# Patient Record
Sex: MALE | Race: WHITE | Employment: UNEMPLOYED | URBAN - METROPOLITAN AREA
[De-identification: names, ages, dates, MRNs, and addresses within clinical notes are randomized per-mention and may not be internally consistent; named-entity substitution may affect disease eponyms.]

---

## 2017-01-01 ENCOUNTER — APPOINTMENT (OUTPATIENT)
Dept: CT IMAGING | Facility: CLINIC | Age: 62
DRG: 432 | End: 2017-01-01
Attending: EMERGENCY MEDICINE
Payer: COMMERCIAL

## 2017-01-01 ENCOUNTER — APPOINTMENT (OUTPATIENT)
Dept: SPEECH THERAPY | Facility: CLINIC | Age: 62
DRG: 432 | End: 2017-01-01
Attending: INTERNAL MEDICINE
Payer: COMMERCIAL

## 2017-01-01 ENCOUNTER — APPOINTMENT (OUTPATIENT)
Dept: GENERAL RADIOLOGY | Facility: CLINIC | Age: 62
DRG: 432 | End: 2017-01-01
Attending: EMERGENCY MEDICINE
Payer: COMMERCIAL

## 2017-01-01 ENCOUNTER — APPOINTMENT (OUTPATIENT)
Dept: SPEECH THERAPY | Facility: CLINIC | Age: 62
DRG: 432 | End: 2017-01-01
Payer: COMMERCIAL

## 2017-01-01 ENCOUNTER — APPOINTMENT (OUTPATIENT)
Dept: OCCUPATIONAL THERAPY | Facility: CLINIC | Age: 62
DRG: 432 | End: 2017-01-01
Attending: INTERNAL MEDICINE
Payer: COMMERCIAL

## 2017-01-01 ENCOUNTER — APPOINTMENT (OUTPATIENT)
Dept: PHYSICAL THERAPY | Facility: CLINIC | Age: 62
DRG: 432 | End: 2017-01-01
Attending: INTERNAL MEDICINE
Payer: COMMERCIAL

## 2017-01-01 ENCOUNTER — APPOINTMENT (OUTPATIENT)
Dept: GENERAL RADIOLOGY | Facility: CLINIC | Age: 62
DRG: 432 | End: 2017-01-01
Attending: INTERNAL MEDICINE
Payer: COMMERCIAL

## 2017-01-01 ENCOUNTER — HOSPITAL ENCOUNTER (INPATIENT)
Facility: CLINIC | Age: 62
LOS: 10 days | Discharge: HOSPICE | DRG: 432 | End: 2017-10-04
Attending: EMERGENCY MEDICINE | Admitting: INTERNAL MEDICINE
Payer: COMMERCIAL

## 2017-01-01 VITALS
SYSTOLIC BLOOD PRESSURE: 94 MMHG | WEIGHT: 139.6 LBS | DIASTOLIC BLOOD PRESSURE: 57 MMHG | TEMPERATURE: 96.4 F | HEART RATE: 95 BPM | OXYGEN SATURATION: 91 % | RESPIRATION RATE: 28 BRPM

## 2017-01-01 DIAGNOSIS — E16.2 HYPOGLYCEMIA: ICD-10-CM

## 2017-01-01 DIAGNOSIS — E87.20 LACTIC ACIDOSIS: ICD-10-CM

## 2017-01-01 DIAGNOSIS — G93.40 ENCEPHALOPATHY: ICD-10-CM

## 2017-01-01 DIAGNOSIS — E87.1 HYPONATREMIA: ICD-10-CM

## 2017-01-01 DIAGNOSIS — R57.9 SHOCK (H): ICD-10-CM

## 2017-01-01 DIAGNOSIS — E87.5 HYPERKALEMIA: ICD-10-CM

## 2017-01-01 DIAGNOSIS — K92.2 ACUTE UPPER GASTROINTESTINAL HEMORRHAGE: ICD-10-CM

## 2017-01-01 DIAGNOSIS — N19 RENAL FAILURE, UNSPECIFIED CHRONICITY: ICD-10-CM

## 2017-01-01 DIAGNOSIS — Z51.5 HOSPICE CARE PATIENT: Primary | ICD-10-CM

## 2017-01-01 DIAGNOSIS — K72.90 LIVER FAILURE WITHOUT HEPATIC COMA, UNSPECIFIED CHRONICITY (H): ICD-10-CM

## 2017-01-01 LAB
ABO + RH BLD: NORMAL
ABO + RH BLD: NORMAL
ALBUMIN SERPL-MCNC: 1.5 G/DL (ref 3.4–5)
ALBUMIN SERPL-MCNC: 1.5 G/DL (ref 3.4–5)
ALBUMIN SERPL-MCNC: 1.6 G/DL (ref 3.4–5)
ALBUMIN SERPL-MCNC: 1.7 G/DL (ref 3.4–5)
ALBUMIN SERPL-MCNC: 1.7 G/DL (ref 3.4–5)
ALBUMIN SERPL-MCNC: 1.8 G/DL (ref 3.4–5)
ALBUMIN SERPL-MCNC: 1.8 G/DL (ref 3.4–5)
ALBUMIN UR-MCNC: 30 MG/DL
ALBUMIN UR-MCNC: NEGATIVE MG/DL
ALP SERPL-CCNC: 159 U/L (ref 40–150)
ALP SERPL-CCNC: 186 U/L (ref 40–150)
ALP SERPL-CCNC: 195 U/L (ref 40–150)
ALP SERPL-CCNC: 212 U/L (ref 40–150)
ALP SERPL-CCNC: 215 U/L (ref 40–150)
ALP SERPL-CCNC: 382 U/L (ref 40–150)
ALP SERPL-CCNC: 386 U/L (ref 40–150)
ALP SERPL-CCNC: 421 U/L (ref 40–150)
ALP SERPL-CCNC: 454 U/L (ref 40–150)
ALT SERPL W P-5'-P-CCNC: 100 U/L (ref 0–70)
ALT SERPL W P-5'-P-CCNC: 100 U/L (ref 0–70)
ALT SERPL W P-5'-P-CCNC: 42 U/L (ref 0–70)
ALT SERPL W P-5'-P-CCNC: 46 U/L (ref 0–70)
ALT SERPL W P-5'-P-CCNC: 49 U/L (ref 0–70)
ALT SERPL W P-5'-P-CCNC: 58 U/L (ref 0–70)
ALT SERPL W P-5'-P-CCNC: 65 U/L (ref 0–70)
ALT SERPL W P-5'-P-CCNC: 84 U/L (ref 0–70)
ALT SERPL W P-5'-P-CCNC: 88 U/L (ref 0–70)
AMMONIA PLAS-SCNC: 12 UMOL/L (ref 10–50)
AMMONIA PLAS-SCNC: 24 UMOL/L (ref 10–50)
AMMONIA PLAS-SCNC: 28 UMOL/L (ref 10–50)
AMMONIA PLAS-SCNC: 36 UMOL/L (ref 10–50)
AMMONIA PLAS-SCNC: 90 UMOL/L (ref 10–50)
AMORPH CRY #/AREA URNS HPF: ABNORMAL /HPF
AMORPH CRY #/AREA URNS HPF: ABNORMAL /HPF
ANION GAP SERPL CALCULATED.3IONS-SCNC: 10 MMOL/L (ref 3–14)
ANION GAP SERPL CALCULATED.3IONS-SCNC: 17 MMOL/L (ref 3–14)
ANION GAP SERPL CALCULATED.3IONS-SCNC: 18 MMOL/L (ref 3–14)
ANION GAP SERPL CALCULATED.3IONS-SCNC: 21 MMOL/L (ref 6–17)
ANION GAP SERPL CALCULATED.3IONS-SCNC: 21 MMOL/L (ref 6–17)
ANION GAP SERPL CALCULATED.3IONS-SCNC: 23 MMOL/L (ref 3–14)
ANION GAP SERPL CALCULATED.3IONS-SCNC: 5 MMOL/L (ref 3–14)
ANION GAP SERPL CALCULATED.3IONS-SCNC: 7 MMOL/L (ref 3–14)
ANION GAP SERPL CALCULATED.3IONS-SCNC: 7 MMOL/L (ref 3–14)
ANION GAP SERPL CALCULATED.3IONS-SCNC: 9 MMOL/L (ref 3–14)
ANISOCYTOSIS BLD QL SMEAR: SLIGHT
APAP SERPL-MCNC: <2 MG/L (ref 10–20)
APPEARANCE UR: ABNORMAL
APPEARANCE UR: ABNORMAL
APTT PPP: 40 SEC (ref 22–37)
AST SERPL W P-5'-P-CCNC: 108 U/L (ref 0–45)
AST SERPL W P-5'-P-CCNC: 112 U/L (ref 0–45)
AST SERPL W P-5'-P-CCNC: 140 U/L (ref 0–45)
AST SERPL W P-5'-P-CCNC: 148 U/L (ref 0–45)
AST SERPL W P-5'-P-CCNC: 154 U/L (ref 0–45)
AST SERPL W P-5'-P-CCNC: 169 U/L (ref 0–45)
AST SERPL W P-5'-P-CCNC: 208 U/L (ref 0–45)
AST SERPL W P-5'-P-CCNC: 249 U/L (ref 0–45)
AST SERPL W P-5'-P-CCNC: 316 U/L (ref 0–45)
BACTERIA #/AREA URNS HPF: ABNORMAL /HPF
BACTERIA SPEC CULT: ABNORMAL
BACTERIA SPEC CULT: ABNORMAL
BACTERIA SPEC CULT: NO GROWTH
BACTERIA SPEC CULT: NO GROWTH
BACTERIA SPEC CULT: NORMAL
BASE DEFICIT BLDA-SCNC: 6.2 MMOL/L
BASE DEFICIT BLDV-SCNC: 11.3 MMOL/L
BASOPHILS # BLD AUTO: 0 10E9/L (ref 0–0.2)
BASOPHILS NFR BLD AUTO: 0 %
BASOPHILS NFR BLD AUTO: 0.1 %
BASOPHILS NFR BLD AUTO: 0.1 %
BASOPHILS NFR BLD AUTO: 0.2 %
BILIRUB DIRECT SERPL-MCNC: 6.8 MG/DL (ref 0–0.2)
BILIRUB DIRECT SERPL-MCNC: 7.2 MG/DL (ref 0–0.2)
BILIRUB DIRECT SERPL-MCNC: 9.4 MG/DL (ref 0–0.2)
BILIRUB DIRECT SERPL-MCNC: 9.6 MG/DL (ref 0–0.2)
BILIRUB SERPL-MCNC: 11.3 MG/DL (ref 0.2–1.3)
BILIRUB SERPL-MCNC: 11.6 MG/DL (ref 0.2–1.3)
BILIRUB SERPL-MCNC: 12.6 MG/DL (ref 0.2–1.3)
BILIRUB SERPL-MCNC: 19.5 MG/DL (ref 0.2–1.3)
BILIRUB SERPL-MCNC: 20.9 MG/DL (ref 0.2–1.3)
BILIRUB SERPL-MCNC: 22.8 MG/DL (ref 0.2–1.3)
BILIRUB SERPL-MCNC: 23.3 MG/DL (ref 0.2–1.3)
BILIRUB SERPL-MCNC: 8.3 MG/DL (ref 0.2–1.3)
BILIRUB SERPL-MCNC: 8.7 MG/DL (ref 0.2–1.3)
BILIRUB UR QL STRIP: ABNORMAL
BILIRUB UR QL STRIP: ABNORMAL
BLD GP AB SCN SERPL QL: NORMAL
BLD PROD TYP BPU: NORMAL
BLD UNIT ID BPU: 0
BLOOD BANK CMNT PATIENT-IMP: NORMAL
BLOOD PRODUCT CODE: NORMAL
BPU ID: NORMAL
BUN SERPL-MCNC: 18 MG/DL (ref 7–30)
BUN SERPL-MCNC: 20 MG/DL (ref 7–30)
BUN SERPL-MCNC: 22 MG/DL (ref 7–30)
BUN SERPL-MCNC: 29 MG/DL (ref 7–30)
BUN SERPL-MCNC: 32 MG/DL (ref 7–30)
BUN SERPL-MCNC: 37 MG/DL (ref 7–30)
BUN SERPL-MCNC: 40 MG/DL (ref 7–30)
BUN SERPL-MCNC: 41 MG/DL (ref 7–30)
BUN SERPL-MCNC: 42 MG/DL (ref 7–30)
BUN SERPL-MCNC: 43 MG/DL (ref 7–30)
BUN SERPL-MCNC: 44 MG/DL (ref 7–30)
BUN SERPL-MCNC: 52 MG/DL (ref 7–30)
CA-I BLD-MCNC: 3.2 MG/DL (ref 4.4–5.2)
CA-I BLD-MCNC: 3.3 MG/DL (ref 4.4–5.2)
CA-I BLD-MCNC: 3.5 MG/DL (ref 4.4–5.2)
CA-I BLD-MCNC: 3.9 MG/DL (ref 4.4–5.2)
CA-I BLD-SCNC: 2.8 MG/DL (ref 4.4–5.2)
CA-I BLD-SCNC: 2.9 MG/DL (ref 4.4–5.2)
CA-I SERPL ISE-MCNC: 3.1 MG/DL (ref 4.4–5.2)
CA-I SERPL ISE-MCNC: 3.3 MG/DL (ref 4.4–5.2)
CALCIUM SERPL-MCNC: 5.6 MG/DL (ref 8.5–10.1)
CALCIUM SERPL-MCNC: 5.7 MG/DL (ref 8.5–10.1)
CALCIUM SERPL-MCNC: 5.7 MG/DL (ref 8.5–10.1)
CALCIUM SERPL-MCNC: 5.9 MG/DL (ref 8.5–10.1)
CALCIUM SERPL-MCNC: 6 MG/DL (ref 8.5–10.1)
CALCIUM SERPL-MCNC: 6.1 MG/DL (ref 8.5–10.1)
CALCIUM SERPL-MCNC: 6.6 MG/DL (ref 8.5–10.1)
CALCIUM SERPL-MCNC: 7.1 MG/DL (ref 8.5–10.1)
CALCIUM SERPL-MCNC: 7.3 MG/DL (ref 8.5–10.1)
CALCIUM SERPL-MCNC: 7.6 MG/DL (ref 8.5–10.1)
CALCIUM SERPL-MCNC: 7.9 MG/DL (ref 8.5–10.1)
CALCIUM SERPL-MCNC: 7.9 MG/DL (ref 8.5–10.1)
CHLORIDE BLD-SCNC: 81 MMOL/L (ref 94–109)
CHLORIDE BLD-SCNC: 86 MMOL/L (ref 94–109)
CHLORIDE SERPL-SCNC: 102 MMOL/L (ref 94–109)
CHLORIDE SERPL-SCNC: 103 MMOL/L (ref 94–109)
CHLORIDE SERPL-SCNC: 103 MMOL/L (ref 94–109)
CHLORIDE SERPL-SCNC: 81 MMOL/L (ref 94–109)
CHLORIDE SERPL-SCNC: 88 MMOL/L (ref 94–109)
CHLORIDE SERPL-SCNC: 89 MMOL/L (ref 94–109)
CHLORIDE SERPL-SCNC: 94 MMOL/L (ref 94–109)
CHLORIDE SERPL-SCNC: 95 MMOL/L (ref 94–109)
CHLORIDE SERPL-SCNC: 95 MMOL/L (ref 94–109)
CHLORIDE SERPL-SCNC: 99 MMOL/L (ref 94–109)
CK SERPL-CCNC: 209 U/L (ref 30–300)
CK SERPL-CCNC: 212 U/L (ref 30–300)
CO2 BLD-SCNC: 16 MMOL/L (ref 20–32)
CO2 BLD-SCNC: 16 MMOL/L (ref 20–32)
CO2 BLDCOV-SCNC: 14 MMOL/L (ref 21–28)
CO2 BLDCOV-SCNC: 15 MMOL/L (ref 21–28)
CO2 SERPL-SCNC: 16 MMOL/L (ref 20–32)
CO2 SERPL-SCNC: 17 MMOL/L (ref 20–32)
CO2 SERPL-SCNC: 18 MMOL/L (ref 20–32)
CO2 SERPL-SCNC: 22 MMOL/L (ref 20–32)
CO2 SERPL-SCNC: 23 MMOL/L (ref 20–32)
CO2 SERPL-SCNC: 24 MMOL/L (ref 20–32)
CO2 SERPL-SCNC: 25 MMOL/L (ref 20–32)
CO2 SERPL-SCNC: 25 MMOL/L (ref 20–32)
CO2 SERPL-SCNC: 26 MMOL/L (ref 20–32)
COLOR UR AUTO: ABNORMAL
COLOR UR AUTO: ABNORMAL
CORTIS SERPL-MCNC: 83.8 UG/DL (ref 4–22)
CREAT BLD-MCNC: 2.9 MG/DL (ref 0.66–1.25)
CREAT BLD-MCNC: 3.4 MG/DL (ref 0.66–1.25)
CREAT SERPL-MCNC: 0.42 MG/DL (ref 0.66–1.25)
CREAT SERPL-MCNC: 0.44 MG/DL (ref 0.66–1.25)
CREAT SERPL-MCNC: 0.52 MG/DL (ref 0.66–1.25)
CREAT SERPL-MCNC: 0.64 MG/DL (ref 0.66–1.25)
CREAT SERPL-MCNC: 0.67 MG/DL (ref 0.66–1.25)
CREAT SERPL-MCNC: 1.11 MG/DL (ref 0.66–1.25)
CREAT SERPL-MCNC: 1.59 MG/DL (ref 0.66–1.25)
CREAT SERPL-MCNC: 1.68 MG/DL (ref 0.66–1.25)
CREAT SERPL-MCNC: 2.03 MG/DL (ref 0.66–1.25)
CREAT SERPL-MCNC: 2.15 MG/DL (ref 0.66–1.25)
CREAT SERPL-MCNC: 2.34 MG/DL (ref 0.66–1.25)
CREAT SERPL-MCNC: 2.63 MG/DL (ref 0.66–1.25)
DIFFERENTIAL METHOD BLD: ABNORMAL
EOSINOPHIL # BLD AUTO: 0 10E9/L (ref 0–0.7)
EOSINOPHIL # BLD AUTO: 0.1 10E9/L (ref 0–0.7)
EOSINOPHIL NFR BLD AUTO: 0 %
EOSINOPHIL NFR BLD AUTO: 0.1 %
EOSINOPHIL NFR BLD AUTO: 1 %
ERYTHROCYTE [DISTWIDTH] IN BLOOD BY AUTOMATED COUNT: 16.5 % (ref 10–15)
ERYTHROCYTE [DISTWIDTH] IN BLOOD BY AUTOMATED COUNT: 16.9 % (ref 10–15)
ERYTHROCYTE [DISTWIDTH] IN BLOOD BY AUTOMATED COUNT: 17.3 % (ref 10–15)
ERYTHROCYTE [DISTWIDTH] IN BLOOD BY AUTOMATED COUNT: 17.7 % (ref 10–15)
ERYTHROCYTE [DISTWIDTH] IN BLOOD BY AUTOMATED COUNT: 18.9 % (ref 10–15)
ERYTHROCYTE [DISTWIDTH] IN BLOOD BY AUTOMATED COUNT: 20.4 % (ref 10–15)
ERYTHROCYTE [DISTWIDTH] IN BLOOD BY AUTOMATED COUNT: 20.4 % (ref 10–15)
ERYTHROCYTE [DISTWIDTH] IN BLOOD BY AUTOMATED COUNT: 20.9 % (ref 10–15)
ERYTHROCYTE [DISTWIDTH] IN BLOOD BY AUTOMATED COUNT: 21.4 % (ref 10–15)
ERYTHROCYTE [DISTWIDTH] IN BLOOD BY AUTOMATED COUNT: 21.4 % (ref 10–15)
ERYTHROCYTE [DISTWIDTH] IN BLOOD BY AUTOMATED COUNT: 21.7 % (ref 10–15)
ETHANOL SERPL-MCNC: <0.01 G/DL
GFR SERPL CREATININE-BSD FRML MDRD: 19 ML/MIN/1.7M2
GFR SERPL CREATININE-BSD FRML MDRD: 22 ML/MIN/1.7M2
GFR SERPL CREATININE-BSD FRML MDRD: 25 ML/MIN/1.7M2
GFR SERPL CREATININE-BSD FRML MDRD: 28 ML/MIN/1.7M2
GFR SERPL CREATININE-BSD FRML MDRD: 31 ML/MIN/1.7M2
GFR SERPL CREATININE-BSD FRML MDRD: 33 ML/MIN/1.7M2
GFR SERPL CREATININE-BSD FRML MDRD: 42 ML/MIN/1.7M2
GFR SERPL CREATININE-BSD FRML MDRD: 44 ML/MIN/1.7M2
GFR SERPL CREATININE-BSD FRML MDRD: 67 ML/MIN/1.7M2
GFR SERPL CREATININE-BSD FRML MDRD: >90 ML/MIN/1.7M2
GLUCOSE BLD-MCNC: 110 MG/DL (ref 70–99)
GLUCOSE BLD-MCNC: 119 MG/DL (ref 70–99)
GLUCOSE BLDC GLUCOMTR-MCNC: 101 MG/DL (ref 70–99)
GLUCOSE BLDC GLUCOMTR-MCNC: 102 MG/DL (ref 70–99)
GLUCOSE BLDC GLUCOMTR-MCNC: 103 MG/DL (ref 70–99)
GLUCOSE BLDC GLUCOMTR-MCNC: 104 MG/DL (ref 70–99)
GLUCOSE BLDC GLUCOMTR-MCNC: 105 MG/DL (ref 70–99)
GLUCOSE BLDC GLUCOMTR-MCNC: 105 MG/DL (ref 70–99)
GLUCOSE BLDC GLUCOMTR-MCNC: 106 MG/DL (ref 70–99)
GLUCOSE BLDC GLUCOMTR-MCNC: 107 MG/DL (ref 70–99)
GLUCOSE BLDC GLUCOMTR-MCNC: 108 MG/DL (ref 70–99)
GLUCOSE BLDC GLUCOMTR-MCNC: 109 MG/DL (ref 70–99)
GLUCOSE BLDC GLUCOMTR-MCNC: 110 MG/DL (ref 70–99)
GLUCOSE BLDC GLUCOMTR-MCNC: 111 MG/DL (ref 70–99)
GLUCOSE BLDC GLUCOMTR-MCNC: 114 MG/DL (ref 70–99)
GLUCOSE BLDC GLUCOMTR-MCNC: 114 MG/DL (ref 70–99)
GLUCOSE BLDC GLUCOMTR-MCNC: 118 MG/DL (ref 70–99)
GLUCOSE BLDC GLUCOMTR-MCNC: 119 MG/DL (ref 70–99)
GLUCOSE BLDC GLUCOMTR-MCNC: 123 MG/DL (ref 70–99)
GLUCOSE BLDC GLUCOMTR-MCNC: 125 MG/DL (ref 70–99)
GLUCOSE BLDC GLUCOMTR-MCNC: 134 MG/DL (ref 70–99)
GLUCOSE BLDC GLUCOMTR-MCNC: 145 MG/DL (ref 70–99)
GLUCOSE BLDC GLUCOMTR-MCNC: 148 MG/DL (ref 70–99)
GLUCOSE BLDC GLUCOMTR-MCNC: 148 MG/DL (ref 70–99)
GLUCOSE BLDC GLUCOMTR-MCNC: 151 MG/DL (ref 70–99)
GLUCOSE BLDC GLUCOMTR-MCNC: 155 MG/DL (ref 70–99)
GLUCOSE BLDC GLUCOMTR-MCNC: 160 MG/DL (ref 70–99)
GLUCOSE BLDC GLUCOMTR-MCNC: 162 MG/DL (ref 70–99)
GLUCOSE BLDC GLUCOMTR-MCNC: 164 MG/DL (ref 70–99)
GLUCOSE BLDC GLUCOMTR-MCNC: 164 MG/DL (ref 70–99)
GLUCOSE BLDC GLUCOMTR-MCNC: 165 MG/DL (ref 70–99)
GLUCOSE BLDC GLUCOMTR-MCNC: 199 MG/DL (ref 70–99)
GLUCOSE BLDC GLUCOMTR-MCNC: 219 MG/DL (ref 70–99)
GLUCOSE BLDC GLUCOMTR-MCNC: 227 MG/DL (ref 70–99)
GLUCOSE BLDC GLUCOMTR-MCNC: 70 MG/DL (ref 70–99)
GLUCOSE BLDC GLUCOMTR-MCNC: 83 MG/DL (ref 70–99)
GLUCOSE BLDC GLUCOMTR-MCNC: 86 MG/DL (ref 70–99)
GLUCOSE BLDC GLUCOMTR-MCNC: 88 MG/DL (ref 70–99)
GLUCOSE BLDC GLUCOMTR-MCNC: 95 MG/DL (ref 70–99)
GLUCOSE BLDC GLUCOMTR-MCNC: 97 MG/DL (ref 70–99)
GLUCOSE SERPL-MCNC: 108 MG/DL (ref 70–99)
GLUCOSE SERPL-MCNC: 109 MG/DL (ref 70–99)
GLUCOSE SERPL-MCNC: 112 MG/DL (ref 70–99)
GLUCOSE SERPL-MCNC: 122 MG/DL (ref 70–99)
GLUCOSE SERPL-MCNC: 122 MG/DL (ref 70–99)
GLUCOSE SERPL-MCNC: 147 MG/DL (ref 70–99)
GLUCOSE SERPL-MCNC: 151 MG/DL (ref 70–99)
GLUCOSE SERPL-MCNC: 67 MG/DL (ref 70–99)
GLUCOSE SERPL-MCNC: 72 MG/DL (ref 70–99)
GLUCOSE SERPL-MCNC: 77 MG/DL (ref 70–99)
GLUCOSE SERPL-MCNC: 89 MG/DL (ref 70–99)
GLUCOSE SERPL-MCNC: 92 MG/DL (ref 70–99)
GLUCOSE UR STRIP-MCNC: 50 MG/DL
GLUCOSE UR STRIP-MCNC: 50 MG/DL
GRAN CASTS #/AREA URNS LPF: 16 /LPF
HCO3 BLD-SCNC: 18 MMOL/L (ref 21–28)
HCO3 BLDV-SCNC: 14 MMOL/L (ref 21–28)
HCT VFR BLD AUTO: 18.4 % (ref 40–53)
HCT VFR BLD AUTO: 22.4 % (ref 40–53)
HCT VFR BLD AUTO: 23.3 % (ref 40–53)
HCT VFR BLD AUTO: 23.3 % (ref 40–53)
HCT VFR BLD AUTO: 23.5 % (ref 40–53)
HCT VFR BLD AUTO: 23.7 % (ref 40–53)
HCT VFR BLD AUTO: 23.9 % (ref 40–53)
HCT VFR BLD AUTO: 25.1 % (ref 40–53)
HCT VFR BLD AUTO: 25.1 % (ref 40–53)
HCT VFR BLD AUTO: 26.4 % (ref 40–53)
HCT VFR BLD AUTO: 28.1 % (ref 40–53)
HCT VFR BLD CALC: 25 %PCV (ref 40–53)
HCT VFR BLD CALC: 26 %PCV (ref 40–53)
HEMOCCULT STL QL: POSITIVE
HGB BLD CALC-MCNC: 8.5 G/DL (ref 13.3–17.7)
HGB BLD CALC-MCNC: 8.8 G/DL (ref 13.3–17.7)
HGB BLD-MCNC: 6 G/DL (ref 13.3–17.7)
HGB BLD-MCNC: 7.5 G/DL (ref 13.3–17.7)
HGB BLD-MCNC: 7.6 G/DL (ref 13.3–17.7)
HGB BLD-MCNC: 7.7 G/DL (ref 13.3–17.7)
HGB BLD-MCNC: 7.8 G/DL (ref 13.3–17.7)
HGB BLD-MCNC: 7.9 G/DL (ref 13.3–17.7)
HGB BLD-MCNC: 8 G/DL (ref 13.3–17.7)
HGB BLD-MCNC: 8.3 G/DL (ref 13.3–17.7)
HGB BLD-MCNC: 8.4 G/DL (ref 13.3–17.7)
HGB BLD-MCNC: 8.8 G/DL (ref 13.3–17.7)
HGB BLD-MCNC: 9.2 G/DL (ref 13.3–17.7)
HGB UR QL STRIP: ABNORMAL
HGB UR QL STRIP: ABNORMAL
HYALINE CASTS #/AREA URNS LPF: 64 /LPF (ref 0–2)
HYALINE CASTS #/AREA URNS LPF: 7 /LPF (ref 0–2)
IMM GRANULOCYTES # BLD: 0.1 10E9/L (ref 0–0.4)
IMM GRANULOCYTES # BLD: 0.2 10E9/L (ref 0–0.4)
IMM GRANULOCYTES # BLD: 0.3 10E9/L (ref 0–0.4)
IMM GRANULOCYTES # BLD: 0.3 10E9/L (ref 0–0.4)
IMM GRANULOCYTES # BLD: 0.5 10E9/L (ref 0–0.4)
IMM GRANULOCYTES NFR BLD: 1.2 %
IMM GRANULOCYTES NFR BLD: 1.3 %
IMM GRANULOCYTES NFR BLD: 1.4 %
IMM GRANULOCYTES NFR BLD: 2.1 %
IMM GRANULOCYTES NFR BLD: 3.4 %
INR PPP: 1.58 (ref 0.86–1.14)
INR PPP: 1.85 (ref 0.86–1.14)
INR PPP: 2 (ref 0.86–1.14)
INR PPP: 2.01 (ref 0.86–1.14)
INR PPP: 2.1 (ref 0.86–1.14)
INR PPP: 2.64 (ref 0.86–1.14)
INTERPRETATION ECG - MUSE: NORMAL
KETONES UR STRIP-MCNC: NEGATIVE MG/DL
KETONES UR STRIP-MCNC: NEGATIVE MG/DL
LACTATE BLD-SCNC: 12.8 MMOL/L (ref 0.7–2.1)
LACTATE BLD-SCNC: 13.9 MMOL/L (ref 0.7–2.1)
LACTATE BLD-SCNC: 2.2 MMOL/L (ref 0.7–2)
LACTATE BLD-SCNC: 2.2 MMOL/L (ref 0.7–2)
LACTATE BLD-SCNC: 2.6 MMOL/L (ref 0.7–2)
LACTATE BLD-SCNC: 2.8 MMOL/L (ref 0.7–2)
LACTATE BLD-SCNC: 8.7 MMOL/L (ref 0.7–2)
LACTATE SERPL-SCNC: 16 MMOL/L (ref 0.4–2)
LACTATE SERPL-SCNC: 3.4 MMOL/L (ref 0.4–2)
LEUKOCYTE ESTERASE UR QL STRIP: NEGATIVE
LEUKOCYTE ESTERASE UR QL STRIP: NEGATIVE
LYMPHOCYTES # BLD AUTO: 0.3 10E9/L (ref 0.8–5.3)
LYMPHOCYTES # BLD AUTO: 0.3 10E9/L (ref 0.8–5.3)
LYMPHOCYTES # BLD AUTO: 0.6 10E9/L (ref 0.8–5.3)
LYMPHOCYTES # BLD AUTO: 0.6 10E9/L (ref 0.8–5.3)
LYMPHOCYTES # BLD AUTO: 0.9 10E9/L (ref 0.8–5.3)
LYMPHOCYTES # BLD AUTO: 1.2 10E9/L (ref 0.8–5.3)
LYMPHOCYTES NFR BLD AUTO: 1.8 %
LYMPHOCYTES NFR BLD AUTO: 13 %
LYMPHOCYTES NFR BLD AUTO: 3 %
LYMPHOCYTES NFR BLD AUTO: 3.1 %
LYMPHOCYTES NFR BLD AUTO: 4.2 %
LYMPHOCYTES NFR BLD AUTO: 5.6 %
Lab: ABNORMAL
Lab: NORMAL
MACROCYTES BLD QL SMEAR: PRESENT
MAGNESIUM SERPL-MCNC: 1.5 MG/DL (ref 1.6–2.3)
MAGNESIUM SERPL-MCNC: 1.7 MG/DL (ref 1.6–2.3)
MAGNESIUM SERPL-MCNC: 1.7 MG/DL (ref 1.6–2.3)
MAGNESIUM SERPL-MCNC: 1.8 MG/DL (ref 1.6–2.3)
MAGNESIUM SERPL-MCNC: 1.8 MG/DL (ref 1.6–2.3)
MAGNESIUM SERPL-MCNC: 1.9 MG/DL (ref 1.6–2.3)
MAGNESIUM SERPL-MCNC: 2 MG/DL (ref 1.6–2.3)
MAGNESIUM SERPL-MCNC: 2.1 MG/DL (ref 1.6–2.3)
MAGNESIUM SERPL-MCNC: 2.4 MG/DL (ref 1.6–2.3)
MAGNESIUM SERPL-MCNC: 2.8 MG/DL (ref 1.6–2.3)
MCH RBC QN AUTO: 32.2 PG (ref 26.5–33)
MCH RBC QN AUTO: 32.2 PG (ref 26.5–33)
MCH RBC QN AUTO: 32.4 PG (ref 26.5–33)
MCH RBC QN AUTO: 32.6 PG (ref 26.5–33)
MCH RBC QN AUTO: 32.7 PG (ref 26.5–33)
MCH RBC QN AUTO: 32.9 PG (ref 26.5–33)
MCH RBC QN AUTO: 33.1 PG (ref 26.5–33)
MCH RBC QN AUTO: 33.1 PG (ref 26.5–33)
MCH RBC QN AUTO: 34.1 PG (ref 26.5–33)
MCHC RBC AUTO-ENTMCNC: 32.6 G/DL (ref 31.5–36.5)
MCHC RBC AUTO-ENTMCNC: 32.7 G/DL (ref 31.5–36.5)
MCHC RBC AUTO-ENTMCNC: 33.3 G/DL (ref 31.5–36.5)
MCHC RBC AUTO-ENTMCNC: 33.5 G/DL (ref 31.5–36.5)
MCHC RBC AUTO-ENTMCNC: 34 G/DL (ref 31.5–36.5)
MCHC RBC AUTO-ENTMCNC: 34.3 G/DL (ref 31.5–36.5)
MCHC RBC AUTO-ENTMCNC: 34.3 G/DL (ref 31.5–36.5)
MCHC RBC AUTO-ENTMCNC: 35 G/DL (ref 31.5–36.5)
MCV RBC AUTO: 100 FL (ref 78–100)
MCV RBC AUTO: 105 FL (ref 78–100)
MCV RBC AUTO: 94 FL (ref 78–100)
MCV RBC AUTO: 94 FL (ref 78–100)
MCV RBC AUTO: 96 FL (ref 78–100)
MCV RBC AUTO: 97 FL (ref 78–100)
MCV RBC AUTO: 98 FL (ref 78–100)
MCV RBC AUTO: 98 FL (ref 78–100)
MCV RBC AUTO: 99 FL (ref 78–100)
METAMYELOCYTES # BLD: 0.1 10E9/L
METAMYELOCYTES NFR BLD MANUAL: 1 %
MIXED CELL CASTS #/AREA URNS LPF: 20 /LPF
MONOCYTES # BLD AUTO: 0.5 10E9/L (ref 0–1.3)
MONOCYTES # BLD AUTO: 0.7 10E9/L (ref 0–1.3)
MONOCYTES # BLD AUTO: 0.8 10E9/L (ref 0–1.3)
MONOCYTES # BLD AUTO: 1.2 10E9/L (ref 0–1.3)
MONOCYTES # BLD AUTO: 1.3 10E9/L (ref 0–1.3)
MONOCYTES # BLD AUTO: 1.6 10E9/L (ref 0–1.3)
MONOCYTES NFR BLD AUTO: 4.8 %
MONOCYTES NFR BLD AUTO: 5.2 %
MONOCYTES NFR BLD AUTO: 6.9 %
MONOCYTES NFR BLD AUTO: 7 %
MONOCYTES NFR BLD AUTO: 8.9 %
MONOCYTES NFR BLD AUTO: 8.9 %
MRSA DNA SPEC QL NAA+PROBE: NEGATIVE
MRSA DNA SPEC QL NAA+PROBE: NEGATIVE
MUCOUS THREADS #/AREA URNS LPF: PRESENT /LPF
MUCOUS THREADS #/AREA URNS LPF: PRESENT /LPF
MYELOCYTES # BLD: 0.1 10E9/L
MYELOCYTES NFR BLD MANUAL: 1 %
NEUTROPHILS # BLD AUTO: 12.8 10E9/L (ref 1.6–8.3)
NEUTROPHILS # BLD AUTO: 13.4 10E9/L (ref 1.6–8.3)
NEUTROPHILS # BLD AUTO: 15.4 10E9/L (ref 1.6–8.3)
NEUTROPHILS # BLD AUTO: 15.5 10E9/L (ref 1.6–8.3)
NEUTROPHILS # BLD AUTO: 7.2 10E9/L (ref 1.6–8.3)
NEUTROPHILS # BLD AUTO: 8.8 10E9/L (ref 1.6–8.3)
NEUTROPHILS NFR BLD AUTO: 79.4 %
NEUTROPHILS NFR BLD AUTO: 84.7 %
NEUTROPHILS NFR BLD AUTO: 85.2 %
NEUTROPHILS NFR BLD AUTO: 86.4 %
NEUTROPHILS NFR BLD AUTO: 87 %
NEUTROPHILS NFR BLD AUTO: 90 %
NITRATE UR QL: NEGATIVE
NITRATE UR QL: NEGATIVE
NRBC # BLD AUTO: 0 10*3/UL
NRBC # BLD AUTO: 0.1 10*3/UL
NRBC # BLD AUTO: 0.1 10*3/UL
NRBC BLD AUTO-RTO: 0 /100
NRBC BLD AUTO-RTO: 1 /100
NRBC BLD AUTO-RTO: 1 /100
NUM BPU REQUESTED: 2
O2/TOTAL GAS SETTING VFR VENT: 6 %
O2/TOTAL GAS SETTING VFR VENT: ABNORMAL %
OXYHGB MFR BLD: 99 % (ref 92–100)
OXYHGB MFR BLDV: 34 %
PCO2 BLD: 28 MM HG (ref 35–45)
PCO2 BLDV: 23 MM HG (ref 40–50)
PCO2 BLDV: 28 MM HG (ref 40–50)
PCO2 BLDV: 37 MM HG (ref 40–50)
PH BLD: 7.41 PH (ref 7.35–7.45)
PH BLDV: 7.2 PH (ref 7.32–7.43)
PH BLDV: 7.31 PH (ref 7.32–7.43)
PH BLDV: 7.42 PH (ref 7.32–7.43)
PH UR STRIP: 5 PH (ref 5–7)
PH UR STRIP: 5 PH (ref 5–7)
PHOSPHATE SERPL-MCNC: 1.4 MG/DL (ref 2.5–4.5)
PHOSPHATE SERPL-MCNC: 1.7 MG/DL (ref 2.5–4.5)
PHOSPHATE SERPL-MCNC: 1.9 MG/DL (ref 2.5–4.5)
PHOSPHATE SERPL-MCNC: 1.9 MG/DL (ref 2.5–4.5)
PHOSPHATE SERPL-MCNC: 2.2 MG/DL (ref 2.5–4.5)
PHOSPHATE SERPL-MCNC: 2.3 MG/DL (ref 2.5–4.5)
PHOSPHATE SERPL-MCNC: 2.5 MG/DL (ref 2.5–4.5)
PHOSPHATE SERPL-MCNC: 3 MG/DL (ref 2.5–4.5)
PHOSPHATE SERPL-MCNC: 3 MG/DL (ref 2.5–4.5)
PLATELET # BLD AUTO: 104 10E9/L (ref 150–450)
PLATELET # BLD AUTO: 108 10E9/L (ref 150–450)
PLATELET # BLD AUTO: 114 10E9/L (ref 150–450)
PLATELET # BLD AUTO: 116 10E9/L (ref 150–450)
PLATELET # BLD AUTO: 144 10E9/L (ref 150–450)
PLATELET # BLD AUTO: 150 10E9/L (ref 150–450)
PLATELET # BLD AUTO: 154 10E9/L (ref 150–450)
PLATELET # BLD AUTO: 168 10E9/L (ref 150–450)
PLATELET # BLD AUTO: 194 10E9/L (ref 150–450)
PLATELET # BLD AUTO: 85 10E9/L (ref 150–450)
PLATELET # BLD AUTO: 91 10E9/L (ref 150–450)
PLATELET # BLD EST: NORMAL 10*3/UL
PO2 BLD: 182 MM HG (ref 80–105)
PO2 BLDV: 23 MM HG (ref 25–47)
PO2 BLDV: 27 MM HG (ref 25–47)
PO2 BLDV: 70 MM HG (ref 25–47)
POTASSIUM BLD-SCNC: 5 MMOL/L (ref 3.4–5.3)
POTASSIUM BLD-SCNC: 5.5 MMOL/L (ref 3.4–5.3)
POTASSIUM SERPL-SCNC: 3.7 MMOL/L (ref 3.4–5.3)
POTASSIUM SERPL-SCNC: 4 MMOL/L (ref 3.4–5.3)
POTASSIUM SERPL-SCNC: 4.1 MMOL/L (ref 3.4–5.3)
POTASSIUM SERPL-SCNC: 4.2 MMOL/L (ref 3.4–5.3)
POTASSIUM SERPL-SCNC: 4.2 MMOL/L (ref 3.4–5.3)
POTASSIUM SERPL-SCNC: 4.3 MMOL/L (ref 3.4–5.3)
POTASSIUM SERPL-SCNC: 4.6 MMOL/L (ref 3.4–5.3)
POTASSIUM SERPL-SCNC: 4.8 MMOL/L (ref 3.4–5.3)
POTASSIUM SERPL-SCNC: 4.8 MMOL/L (ref 3.4–5.3)
POTASSIUM SERPL-SCNC: 4.9 MMOL/L (ref 3.4–5.3)
POTASSIUM SERPL-SCNC: 4.9 MMOL/L (ref 3.4–5.3)
POTASSIUM SERPL-SCNC: 5.5 MMOL/L (ref 3.4–5.3)
PROCALCITONIN SERPL-MCNC: 0.5 NG/ML
PROCALCITONIN SERPL-MCNC: 2.95 NG/ML
PROT SERPL-MCNC: 4.5 G/DL (ref 6.8–8.8)
PROT SERPL-MCNC: 4.7 G/DL (ref 6.8–8.8)
PROT SERPL-MCNC: 4.9 G/DL (ref 6.8–8.8)
RBC # BLD AUTO: 1.76 10E12/L (ref 4.4–5.9)
RBC # BLD AUTO: 2.33 10E12/L (ref 4.4–5.9)
RBC # BLD AUTO: 2.43 10E12/L (ref 4.4–5.9)
RBC # BLD AUTO: 2.47 10E12/L (ref 4.4–5.9)
RBC # BLD AUTO: 2.51 10E12/L (ref 4.4–5.9)
RBC # BLD AUTO: 2.54 10E12/L (ref 4.4–5.9)
RBC # BLD AUTO: 2.61 10E12/L (ref 4.4–5.9)
RBC # BLD AUTO: 2.7 10E12/L (ref 4.4–5.9)
RBC # BLD AUTO: 2.81 10E12/L (ref 4.4–5.9)
RBC #/AREA URNS AUTO: 2 /HPF (ref 0–2)
RBC #/AREA URNS AUTO: 6 /HPF (ref 0–2)
SALICYLATES SERPL-MCNC: <2 MG/DL
SAO2 % BLDV FROM PO2: 44 %
SAO2 % BLDV FROM PO2: 90 %
SODIUM BLD-SCNC: 118 MMOL/L (ref 133–144)
SODIUM BLD-SCNC: 123 MMOL/L (ref 133–144)
SODIUM SERPL-SCNC: 120 MMOL/L (ref 133–144)
SODIUM SERPL-SCNC: 123 MMOL/L (ref 133–144)
SODIUM SERPL-SCNC: 124 MMOL/L (ref 133–144)
SODIUM SERPL-SCNC: 127 MMOL/L (ref 133–144)
SODIUM SERPL-SCNC: 127 MMOL/L (ref 133–144)
SODIUM SERPL-SCNC: 128 MMOL/L (ref 133–144)
SODIUM SERPL-SCNC: 129 MMOL/L (ref 133–144)
SODIUM SERPL-SCNC: 131 MMOL/L (ref 133–144)
SODIUM SERPL-SCNC: 132 MMOL/L (ref 133–144)
SODIUM SERPL-SCNC: 133 MMOL/L (ref 133–144)
SODIUM SERPL-SCNC: 134 MMOL/L (ref 133–144)
SODIUM SERPL-SCNC: 135 MMOL/L (ref 133–144)
SOURCE: ABNORMAL
SOURCE: ABNORMAL
SP GR UR STRIP: 1.02 (ref 1–1.03)
SP GR UR STRIP: 1.02 (ref 1–1.03)
SPECIMEN EXP DATE BLD: NORMAL
SPECIMEN SOURCE: ABNORMAL
SPECIMEN SOURCE: NORMAL
SQUAMOUS #/AREA URNS AUTO: 1 /HPF (ref 0–1)
TARGETS BLD QL SMEAR: SLIGHT
TRANSFUSION STATUS PATIENT QL: NORMAL
TRIGL SERPL-MCNC: NORMAL MG/DL
UPPER GI ENDOSCOPY: NORMAL
UROBILINOGEN UR STRIP-MCNC: 4 MG/DL (ref 0–2)
UROBILINOGEN UR STRIP-MCNC: 4 MG/DL (ref 0–2)
WBC # BLD AUTO: 10.1 10E9/L (ref 4–11)
WBC # BLD AUTO: 10.6 10E9/L (ref 4–11)
WBC # BLD AUTO: 15.1 10E9/L (ref 4–11)
WBC # BLD AUTO: 15.6 10E9/L (ref 4–11)
WBC # BLD AUTO: 17.2 10E9/L (ref 4–11)
WBC # BLD AUTO: 17.8 10E9/L (ref 4–11)
WBC # BLD AUTO: 23.7 10E9/L (ref 4–11)
WBC # BLD AUTO: 24.4 10E9/L (ref 4–11)
WBC # BLD AUTO: 28.1 10E9/L (ref 4–11)
WBC # BLD AUTO: 34.2 10E9/L (ref 4–11)
WBC # BLD AUTO: 8.9 10E9/L (ref 4–11)
WBC #/AREA URNS AUTO: 11 /HPF (ref 0–2)
WBC #/AREA URNS AUTO: 17 /HPF (ref 0–2)

## 2017-01-01 PROCEDURE — 83735 ASSAY OF MAGNESIUM: CPT | Performed by: INTERNAL MEDICINE

## 2017-01-01 PROCEDURE — 25800025 ZZH RX 258: Performed by: SURGERY

## 2017-01-01 PROCEDURE — 00000146 ZZHCL STATISTIC GLUCOSE BY METER IP

## 2017-01-01 PROCEDURE — 83605 ASSAY OF LACTIC ACID: CPT | Performed by: SURGERY

## 2017-01-01 PROCEDURE — 25000132 ZZH RX MED GY IP 250 OP 250 PS 637: Performed by: SURGERY

## 2017-01-01 PROCEDURE — 80053 COMPREHEN METABOLIC PANEL: CPT | Performed by: INTERNAL MEDICINE

## 2017-01-01 PROCEDURE — S5010 5% DEXTROSE AND 0.45% SALINE: HCPCS | Performed by: INTERNAL MEDICINE

## 2017-01-01 PROCEDURE — 84100 ASSAY OF PHOSPHORUS: CPT | Performed by: INTERNAL MEDICINE

## 2017-01-01 PROCEDURE — 25000125 ZZHC RX 250: Performed by: EMERGENCY MEDICINE

## 2017-01-01 PROCEDURE — 83605 ASSAY OF LACTIC ACID: CPT

## 2017-01-01 PROCEDURE — 87641 MR-STAPH DNA AMP PROBE: CPT | Performed by: INTERNAL MEDICINE

## 2017-01-01 PROCEDURE — 99233 SBSQ HOSP IP/OBS HIGH 50: CPT | Performed by: INTERNAL MEDICINE

## 2017-01-01 PROCEDURE — 71010 XR CHEST PORT 1 VW: CPT

## 2017-01-01 PROCEDURE — 40000193 ZZH STATISTIC PT WARD VISIT: Performed by: PHYSICAL THERAPIST

## 2017-01-01 PROCEDURE — 82248 BILIRUBIN DIRECT: CPT | Performed by: INTERNAL MEDICINE

## 2017-01-01 PROCEDURE — 84145 PROCALCITONIN (PCT): CPT | Performed by: INTERNAL MEDICINE

## 2017-01-01 PROCEDURE — 87640 STAPH A DNA AMP PROBE: CPT | Performed by: INTERNAL MEDICINE

## 2017-01-01 PROCEDURE — 85027 COMPLETE CBC AUTOMATED: CPT | Performed by: INTERNAL MEDICINE

## 2017-01-01 PROCEDURE — 83605 ASSAY OF LACTIC ACID: CPT | Performed by: INTERNAL MEDICINE

## 2017-01-01 PROCEDURE — 82805 BLOOD GASES W/O2 SATURATION: CPT | Performed by: EMERGENCY MEDICINE

## 2017-01-01 PROCEDURE — 27210131 ZZH KIT ART LINE INSERTION

## 2017-01-01 PROCEDURE — 40000133 ZZH STATISTIC OT WARD VISIT

## 2017-01-01 PROCEDURE — 20000003 ZZH R&B ICU

## 2017-01-01 PROCEDURE — 3E043XZ INTRODUCTION OF VASOPRESSOR INTO CENTRAL VEIN, PERCUTANEOUS APPROACH: ICD-10-PCS | Performed by: INTERNAL MEDICINE

## 2017-01-01 PROCEDURE — 99291 CRITICAL CARE FIRST HOUR: CPT | Performed by: SURGERY

## 2017-01-01 PROCEDURE — 25000128 H RX IP 250 OP 636: Performed by: EMERGENCY MEDICINE

## 2017-01-01 PROCEDURE — 25800025 ZZH RX 258: Performed by: INTERNAL MEDICINE

## 2017-01-01 PROCEDURE — 94003 VENT MGMT INPAT SUBQ DAY: CPT

## 2017-01-01 PROCEDURE — 43235 EGD DIAGNOSTIC BRUSH WASH: CPT | Performed by: INTERNAL MEDICINE

## 2017-01-01 PROCEDURE — 93005 ELECTROCARDIOGRAM TRACING: CPT

## 2017-01-01 PROCEDURE — 82140 ASSAY OF AMMONIA: CPT | Performed by: EMERGENCY MEDICINE

## 2017-01-01 PROCEDURE — 31500 INSERT EMERGENCY AIRWAY: CPT

## 2017-01-01 PROCEDURE — 82550 ASSAY OF CK (CPK): CPT | Performed by: INTERNAL MEDICINE

## 2017-01-01 PROCEDURE — 40000275 ZZH STATISTIC RCP TIME EA 10 MIN

## 2017-01-01 PROCEDURE — 80048 BASIC METABOLIC PNL TOTAL CA: CPT | Performed by: INTERNAL MEDICINE

## 2017-01-01 PROCEDURE — 99232 SBSQ HOSP IP/OBS MODERATE 35: CPT | Performed by: INTERNAL MEDICINE

## 2017-01-01 PROCEDURE — 80053 COMPREHEN METABOLIC PANEL: CPT | Performed by: EMERGENCY MEDICINE

## 2017-01-01 PROCEDURE — 82330 ASSAY OF CALCIUM: CPT | Performed by: INTERNAL MEDICINE

## 2017-01-01 PROCEDURE — 25000132 ZZH RX MED GY IP 250 OP 250 PS 637: Performed by: INTERNAL MEDICINE

## 2017-01-01 PROCEDURE — 87186 SC STD MICRODIL/AGAR DIL: CPT | Performed by: INTERNAL MEDICINE

## 2017-01-01 PROCEDURE — 25000128 H RX IP 250 OP 636: Performed by: SURGERY

## 2017-01-01 PROCEDURE — 25000128 H RX IP 250 OP 636: Performed by: INTERNAL MEDICINE

## 2017-01-01 PROCEDURE — 99223 1ST HOSP IP/OBS HIGH 75: CPT | Mod: AI | Performed by: INTERNAL MEDICINE

## 2017-01-01 PROCEDURE — 25000132 ZZH RX MED GY IP 250 OP 250 PS 637: Performed by: NURSE PRACTITIONER

## 2017-01-01 PROCEDURE — 87088 URINE BACTERIA CULTURE: CPT | Performed by: INTERNAL MEDICINE

## 2017-01-01 PROCEDURE — 97165 OT EVAL LOW COMPLEX 30 MIN: CPT | Mod: GO

## 2017-01-01 PROCEDURE — 84478 ASSAY OF TRIGLYCERIDES: CPT | Performed by: INTERNAL MEDICINE

## 2017-01-01 PROCEDURE — 82272 OCCULT BLD FECES 1-3 TESTS: CPT | Performed by: EMERGENCY MEDICINE

## 2017-01-01 PROCEDURE — 85018 HEMOGLOBIN: CPT | Performed by: INTERNAL MEDICINE

## 2017-01-01 PROCEDURE — 85610 PROTHROMBIN TIME: CPT | Performed by: EMERGENCY MEDICINE

## 2017-01-01 PROCEDURE — HZ2ZZZZ DETOXIFICATION SERVICES FOR SUBSTANCE ABUSE TREATMENT: ICD-10-PCS | Performed by: INTERNAL MEDICINE

## 2017-01-01 PROCEDURE — 94002 VENT MGMT INPAT INIT DAY: CPT

## 2017-01-01 PROCEDURE — 85025 COMPLETE CBC W/AUTO DIFF WBC: CPT | Performed by: INTERNAL MEDICINE

## 2017-01-01 PROCEDURE — 36415 COLL VENOUS BLD VENIPUNCTURE: CPT | Performed by: INTERNAL MEDICINE

## 2017-01-01 PROCEDURE — 40000986 XR CHEST PORT 1 VW

## 2017-01-01 PROCEDURE — 25000125 ZZHC RX 250: Performed by: INTERNAL MEDICINE

## 2017-01-01 PROCEDURE — 74176 CT ABD & PELVIS W/O CONTRAST: CPT

## 2017-01-01 PROCEDURE — 82533 TOTAL CORTISOL: CPT | Performed by: EMERGENCY MEDICINE

## 2017-01-01 PROCEDURE — P9016 RBC LEUKOCYTES REDUCED: HCPCS | Performed by: EMERGENCY MEDICINE

## 2017-01-01 PROCEDURE — 82140 ASSAY OF AMMONIA: CPT | Performed by: INTERNAL MEDICINE

## 2017-01-01 PROCEDURE — 97530 THERAPEUTIC ACTIVITIES: CPT | Mod: GP | Performed by: PHYSICAL THERAPIST

## 2017-01-01 PROCEDURE — 99285 EMERGENCY DEPT VISIT HI MDM: CPT | Mod: 25

## 2017-01-01 PROCEDURE — 99233 SBSQ HOSP IP/OBS HIGH 50: CPT | Performed by: NURSE PRACTITIONER

## 2017-01-01 PROCEDURE — 12000000 ZZH R&B MED SURG/OB

## 2017-01-01 PROCEDURE — 85610 PROTHROMBIN TIME: CPT | Performed by: INTERNAL MEDICINE

## 2017-01-01 PROCEDURE — 86923 COMPATIBILITY TEST ELECTRIC: CPT | Performed by: EMERGENCY MEDICINE

## 2017-01-01 PROCEDURE — 85014 HEMATOCRIT: CPT

## 2017-01-01 PROCEDURE — 0DJ08ZZ INSPECTION OF UPPER INTESTINAL TRACT, VIA NATURAL OR ARTIFICIAL OPENING ENDOSCOPIC: ICD-10-PCS | Performed by: INTERNAL MEDICINE

## 2017-01-01 PROCEDURE — 96365 THER/PROPH/DIAG IV INF INIT: CPT

## 2017-01-01 PROCEDURE — 76604 US EXAM CHEST: CPT

## 2017-01-01 PROCEDURE — 25000131 ZZH RX MED GY IP 250 OP 636 PS 637: Performed by: INTERNAL MEDICINE

## 2017-01-01 PROCEDURE — 80076 HEPATIC FUNCTION PANEL: CPT | Performed by: INTERNAL MEDICINE

## 2017-01-01 PROCEDURE — 82803 BLOOD GASES ANY COMBINATION: CPT

## 2017-01-01 PROCEDURE — 40000281 ZZH STATISTIC TRANSPORT TIME EA 15 MIN

## 2017-01-01 PROCEDURE — S5010 5% DEXTROSE AND 0.45% SALINE: HCPCS | Performed by: SURGERY

## 2017-01-01 PROCEDURE — 25000128 H RX IP 250 OP 636: Performed by: NURSE PRACTITIONER

## 2017-01-01 PROCEDURE — G0500 MOD SEDAT ENDO SERVICE >5YRS: HCPCS | Performed by: INTERNAL MEDICINE

## 2017-01-01 PROCEDURE — 70450 CT HEAD/BRAIN W/O DYE: CPT

## 2017-01-01 PROCEDURE — 85025 COMPLETE CBC W/AUTO DIFF WBC: CPT | Performed by: EMERGENCY MEDICINE

## 2017-01-01 PROCEDURE — 80329 ANALGESICS NON-OPIOID 1 OR 2: CPT | Performed by: EMERGENCY MEDICINE

## 2017-01-01 PROCEDURE — 86900 BLOOD TYPING SEROLOGIC ABO: CPT | Performed by: EMERGENCY MEDICINE

## 2017-01-01 PROCEDURE — 80320 DRUG SCREEN QUANTALCOHOLS: CPT | Performed by: EMERGENCY MEDICINE

## 2017-01-01 PROCEDURE — 12000011 ZZH R&B MS OVERFLOW

## 2017-01-01 PROCEDURE — 96368 THER/DIAG CONCURRENT INF: CPT

## 2017-01-01 PROCEDURE — 40000225 ZZH STATISTIC SLP WARD VISIT

## 2017-01-01 PROCEDURE — 99223 1ST HOSP IP/OBS HIGH 75: CPT | Performed by: NURSE PRACTITIONER

## 2017-01-01 PROCEDURE — 92526 ORAL FUNCTION THERAPY: CPT | Mod: GN

## 2017-01-01 PROCEDURE — 80047 BASIC METABLC PNL IONIZED CA: CPT

## 2017-01-01 PROCEDURE — 82330 ASSAY OF CALCIUM: CPT | Performed by: EMERGENCY MEDICINE

## 2017-01-01 PROCEDURE — 99239 HOSP IP/OBS DSCHRG MGMT >30: CPT | Performed by: INTERNAL MEDICINE

## 2017-01-01 PROCEDURE — S0164 INJECTION PANTROPRAZOLE: HCPCS | Performed by: EMERGENCY MEDICINE

## 2017-01-01 PROCEDURE — 76937 US GUIDE VASCULAR ACCESS: CPT

## 2017-01-01 PROCEDURE — 36430 TRANSFUSION BLD/BLD COMPNT: CPT

## 2017-01-01 PROCEDURE — 86850 RBC ANTIBODY SCREEN: CPT | Performed by: EMERGENCY MEDICINE

## 2017-01-01 PROCEDURE — 27210995 ZZH RX 272: Performed by: INTERNAL MEDICINE

## 2017-01-01 PROCEDURE — 81001 URINALYSIS AUTO W/SCOPE: CPT | Performed by: EMERGENCY MEDICINE

## 2017-01-01 PROCEDURE — 40000344 ZZHCL STATISTIC THAWING COMPONENT: Performed by: EMERGENCY MEDICINE

## 2017-01-01 PROCEDURE — 86901 BLOOD TYPING SEROLOGIC RH(D): CPT | Performed by: EMERGENCY MEDICINE

## 2017-01-01 PROCEDURE — 81001 URINALYSIS AUTO W/SCOPE: CPT | Performed by: INTERNAL MEDICINE

## 2017-01-01 PROCEDURE — 36556 INSERT NON-TUNNEL CV CATH: CPT

## 2017-01-01 PROCEDURE — 87040 BLOOD CULTURE FOR BACTERIA: CPT | Performed by: INTERNAL MEDICINE

## 2017-01-01 PROCEDURE — 25000131 ZZH RX MED GY IP 250 OP 636 PS 637: Performed by: EMERGENCY MEDICINE

## 2017-01-01 PROCEDURE — 96375 TX/PRO/DX INJ NEW DRUG ADDON: CPT

## 2017-01-01 PROCEDURE — 85730 THROMBOPLASTIN TIME PARTIAL: CPT | Performed by: EMERGENCY MEDICINE

## 2017-01-01 PROCEDURE — 87040 BLOOD CULTURE FOR BACTERIA: CPT | Performed by: EMERGENCY MEDICINE

## 2017-01-01 PROCEDURE — 85018 HEMOGLOBIN: CPT | Performed by: EMERGENCY MEDICINE

## 2017-01-01 PROCEDURE — 72125 CT NECK SPINE W/O DYE: CPT

## 2017-01-01 PROCEDURE — 82550 ASSAY OF CK (CPK): CPT | Performed by: EMERGENCY MEDICINE

## 2017-01-01 PROCEDURE — 97161 PT EVAL LOW COMPLEX 20 MIN: CPT | Mod: GP | Performed by: PHYSICAL THERAPIST

## 2017-01-01 PROCEDURE — 99232 SBSQ HOSP IP/OBS MODERATE 35: CPT | Performed by: SURGERY

## 2017-01-01 PROCEDURE — 5A1945Z RESPIRATORY VENTILATION, 24-96 CONSECUTIVE HOURS: ICD-10-PCS | Performed by: INTERNAL MEDICINE

## 2017-01-01 PROCEDURE — 36620 INSERTION CATHETER ARTERY: CPT

## 2017-01-01 PROCEDURE — 83735 ASSAY OF MAGNESIUM: CPT | Performed by: EMERGENCY MEDICINE

## 2017-01-01 PROCEDURE — P9059 PLASMA, FRZ BETWEEN 8-24HOUR: HCPCS | Performed by: EMERGENCY MEDICINE

## 2017-01-01 PROCEDURE — 83605 ASSAY OF LACTIC ACID: CPT | Performed by: EMERGENCY MEDICINE

## 2017-01-01 PROCEDURE — 92610 EVALUATE SWALLOWING FUNCTION: CPT | Mod: GN

## 2017-01-01 PROCEDURE — 82248 BILIRUBIN DIRECT: CPT | Performed by: EMERGENCY MEDICINE

## 2017-01-01 PROCEDURE — 82805 BLOOD GASES W/O2 SATURATION: CPT | Performed by: INTERNAL MEDICINE

## 2017-01-01 PROCEDURE — 97530 THERAPEUTIC ACTIVITIES: CPT | Mod: GO

## 2017-01-01 PROCEDURE — 99356 ZZC PROLONGED SERV,INPATIENT,1ST HR: CPT | Performed by: NURSE PRACTITIONER

## 2017-01-01 PROCEDURE — 87086 URINE CULTURE/COLONY COUNT: CPT | Performed by: INTERNAL MEDICINE

## 2017-01-01 PROCEDURE — 97110 THERAPEUTIC EXERCISES: CPT | Mod: GP | Performed by: PHYSICAL THERAPIST

## 2017-01-01 PROCEDURE — 80048 BASIC METABOLIC PNL TOTAL CA: CPT | Performed by: EMERGENCY MEDICINE

## 2017-01-01 PROCEDURE — 71020 XR CHEST 2 VW: CPT

## 2017-01-01 RX ORDER — BISACODYL 10 MG
10 SUPPOSITORY, RECTAL RECTAL
Status: DISCONTINUED | OUTPATIENT
Start: 2017-10-06 | End: 2017-01-01 | Stop reason: HOSPADM

## 2017-01-01 RX ORDER — DEXTROSE MONOHYDRATE 25 G/50ML
25-50 INJECTION, SOLUTION INTRAVENOUS
Status: DISCONTINUED | OUTPATIENT
Start: 2017-01-01 | End: 2017-01-01

## 2017-01-01 RX ORDER — PREDNISOLONE SODIUM PHOSPHATE 15 MG/5ML
40 SOLUTION ORAL DAILY
Status: DISCONTINUED | OUTPATIENT
Start: 2017-01-01 | End: 2017-01-01

## 2017-01-01 RX ORDER — POTASSIUM CL/LIDO/0.9 % NACL 10MEQ/0.1L
10 INTRAVENOUS SOLUTION, PIGGYBACK (ML) INTRAVENOUS
Status: DISCONTINUED | OUTPATIENT
Start: 2017-01-01 | End: 2017-01-01

## 2017-01-01 RX ORDER — HYDROMORPHONE HYDROCHLORIDE 1 MG/ML
.3-.5 INJECTION, SOLUTION INTRAMUSCULAR; INTRAVENOUS; SUBCUTANEOUS
Status: DISCONTINUED | OUTPATIENT
Start: 2017-01-01 | End: 2017-01-01

## 2017-01-01 RX ORDER — HYDROMORPHONE HCL/0.9% NACL/PF 0.2MG/0.2
0.2 SYRINGE (ML) INTRAVENOUS EVERY 4 HOURS PRN
Status: DISCONTINUED | OUTPATIENT
Start: 2017-01-01 | End: 2017-01-01 | Stop reason: HOSPADM

## 2017-01-01 RX ORDER — LORAZEPAM 2 MG/ML
2 INJECTION INTRAMUSCULAR ONCE
Status: COMPLETED | OUTPATIENT
Start: 2017-01-01 | End: 2017-01-01

## 2017-01-01 RX ORDER — ETOMIDATE 2 MG/ML
20 INJECTION INTRAVENOUS ONCE
Status: COMPLETED | OUTPATIENT
Start: 2017-01-01 | End: 2017-01-01

## 2017-01-01 RX ORDER — LORAZEPAM 2 MG/ML
.25-.5 INJECTION INTRAMUSCULAR EVERY 6 HOURS PRN
Status: DISCONTINUED | OUTPATIENT
Start: 2017-01-01 | End: 2017-01-01 | Stop reason: HOSPADM

## 2017-01-01 RX ORDER — MULTIPLE VITAMINS W/ MINERALS TAB 9MG-400MCG
1 TAB ORAL DAILY
Status: DISCONTINUED | OUTPATIENT
Start: 2017-01-01 | End: 2017-01-01

## 2017-01-01 RX ORDER — CEFOTAXIME INJECTION 2 G/1
2 POWDER, FOR SOLUTION INTRAMUSCULAR; INTRAVENOUS EVERY 8 HOURS
Status: DISCONTINUED | OUTPATIENT
Start: 2017-01-01 | End: 2017-01-01

## 2017-01-01 RX ORDER — FLUMAZENIL 0.1 MG/ML
0.2 INJECTION, SOLUTION INTRAVENOUS
Status: ACTIVE | OUTPATIENT
Start: 2017-01-01 | End: 2017-01-01

## 2017-01-01 RX ORDER — POTASSIUM CHLORIDE 7.45 MG/ML
10 INJECTION INTRAVENOUS
Status: DISCONTINUED | OUTPATIENT
Start: 2017-01-01 | End: 2017-01-01

## 2017-01-01 RX ORDER — FENTANYL CITRATE 50 UG/ML
25-50 INJECTION, SOLUTION INTRAMUSCULAR; INTRAVENOUS
Status: DISCONTINUED | OUTPATIENT
Start: 2017-01-01 | End: 2017-01-01 | Stop reason: HOSPADM

## 2017-01-01 RX ORDER — ONDANSETRON 4 MG/1
4 TABLET, ORALLY DISINTEGRATING ORAL EVERY 6 HOURS PRN
Status: DISCONTINUED | OUTPATIENT
Start: 2017-01-01 | End: 2017-01-01 | Stop reason: HOSPADM

## 2017-01-01 RX ORDER — AMOXICILLIN 250 MG
1 CAPSULE ORAL 2 TIMES DAILY
Status: DISCONTINUED | OUTPATIENT
Start: 2017-01-01 | End: 2017-01-01 | Stop reason: HOSPADM

## 2017-01-01 RX ORDER — BISACODYL 10 MG
10 SUPPOSITORY, RECTAL RECTAL DAILY PRN
Qty: 10 SUPPOSITORY | Refills: 0
Start: 2017-01-01

## 2017-01-01 RX ORDER — METHYLPREDNISOLONE SODIUM SUCCINATE 40 MG/ML
40 INJECTION, POWDER, LYOPHILIZED, FOR SOLUTION INTRAMUSCULAR; INTRAVENOUS EVERY 24 HOURS
Status: DISCONTINUED | OUTPATIENT
Start: 2017-01-01 | End: 2017-01-01

## 2017-01-01 RX ORDER — LORAZEPAM 0.5 MG/1
.25-.5 TABLET ORAL
Qty: 20 TABLET | Refills: 0
Start: 2017-01-01

## 2017-01-01 RX ORDER — NALOXONE HYDROCHLORIDE 0.4 MG/ML
.1-.4 INJECTION, SOLUTION INTRAMUSCULAR; INTRAVENOUS; SUBCUTANEOUS
Status: DISCONTINUED | OUTPATIENT
Start: 2017-01-01 | End: 2017-01-01 | Stop reason: HOSPADM

## 2017-01-01 RX ORDER — POTASSIUM CHLORIDE 29.8 MG/ML
20 INJECTION INTRAVENOUS
Status: DISCONTINUED | OUTPATIENT
Start: 2017-01-01 | End: 2017-01-01

## 2017-01-01 RX ORDER — LORAZEPAM 2 MG/ML
1-2 INJECTION INTRAMUSCULAR EVERY 30 MIN PRN
Status: DISCONTINUED | OUTPATIENT
Start: 2017-01-01 | End: 2017-01-01

## 2017-01-01 RX ORDER — POTASSIUM CHLORIDE 1.5 G/1.58G
20-40 POWDER, FOR SOLUTION ORAL
Status: DISCONTINUED | OUTPATIENT
Start: 2017-01-01 | End: 2017-01-01

## 2017-01-01 RX ORDER — LIDOCAINE 40 MG/G
CREAM TOPICAL
Status: DISCONTINUED | OUTPATIENT
Start: 2017-01-01 | End: 2017-01-01 | Stop reason: HOSPADM

## 2017-01-01 RX ORDER — POTASSIUM CHLORIDE 1500 MG/1
20-40 TABLET, EXTENDED RELEASE ORAL
Status: DISCONTINUED | OUTPATIENT
Start: 2017-01-01 | End: 2017-01-01

## 2017-01-01 RX ORDER — CEFTRIAXONE 1 G/1
1 INJECTION, POWDER, FOR SOLUTION INTRAMUSCULAR; INTRAVENOUS EVERY 24 HOURS
Status: DISCONTINUED | OUTPATIENT
Start: 2017-01-01 | End: 2017-01-01 | Stop reason: HOSPADM

## 2017-01-01 RX ORDER — CEFTRIAXONE 2 G/1
2 INJECTION, POWDER, FOR SOLUTION INTRAMUSCULAR; INTRAVENOUS ONCE
Status: COMPLETED | OUTPATIENT
Start: 2017-01-01 | End: 2017-01-01

## 2017-01-01 RX ORDER — LORAZEPAM 1 MG/1
1-2 TABLET ORAL EVERY 30 MIN PRN
Status: DISCONTINUED | OUTPATIENT
Start: 2017-01-01 | End: 2017-01-01

## 2017-01-01 RX ORDER — LORAZEPAM 0.5 MG/1
.25-.5 TABLET ORAL EVERY 6 HOURS
Status: DISCONTINUED | OUTPATIENT
Start: 2017-01-01 | End: 2017-01-01

## 2017-01-01 RX ORDER — MAGNESIUM SULFATE HEPTAHYDRATE 40 MG/ML
4 INJECTION, SOLUTION INTRAVENOUS EVERY 4 HOURS PRN
Status: DISCONTINUED | OUTPATIENT
Start: 2017-01-01 | End: 2017-01-01

## 2017-01-01 RX ORDER — ACETAMINOPHEN 650 MG/1
650 SUPPOSITORY RECTAL EVERY 6 HOURS PRN
Qty: 10 SUPPOSITORY | Refills: 0
Start: 2017-01-01

## 2017-01-01 RX ORDER — CHLORHEXIDINE GLUCONATE ORAL RINSE 1.2 MG/ML
15 SOLUTION DENTAL 2 TIMES DAILY
Status: DISCONTINUED | OUTPATIENT
Start: 2017-01-01 | End: 2017-01-01 | Stop reason: CLARIF

## 2017-01-01 RX ORDER — PANTOPRAZOLE SODIUM 40 MG/1
40 TABLET, DELAYED RELEASE ORAL
Status: DISCONTINUED | OUTPATIENT
Start: 2017-01-01 | End: 2017-01-01 | Stop reason: HOSPADM

## 2017-01-01 RX ORDER — ATROPINE SULFATE 10 MG/ML
1-2 SOLUTION/ DROPS OPHTHALMIC
Status: DISCONTINUED | OUTPATIENT
Start: 2017-01-01 | End: 2017-01-01 | Stop reason: HOSPADM

## 2017-01-01 RX ORDER — NICOTINE POLACRILEX 4 MG
15-30 LOZENGE BUCCAL
Status: DISCONTINUED | OUTPATIENT
Start: 2017-01-01 | End: 2017-01-01

## 2017-01-01 RX ORDER — AMOXICILLIN 250 MG
1 CAPSULE ORAL 2 TIMES DAILY
Qty: 100 TABLET | Refills: 0
Start: 2017-01-01

## 2017-01-01 RX ORDER — SODIUM CHLORIDE 9 MG/ML
INJECTION, SOLUTION INTRAVENOUS CONTINUOUS
Status: DISCONTINUED | OUTPATIENT
Start: 2017-01-01 | End: 2017-01-01

## 2017-01-01 RX ORDER — CEFTRIAXONE 2 G/1
2 INJECTION, POWDER, FOR SOLUTION INTRAMUSCULAR; INTRAVENOUS EVERY 24 HOURS
Status: DISCONTINUED | OUTPATIENT
Start: 2017-01-01 | End: 2017-01-01

## 2017-01-01 RX ORDER — ACETAMINOPHEN 650 MG/1
650 SUPPOSITORY RECTAL EVERY 6 HOURS PRN
Status: DISCONTINUED | OUTPATIENT
Start: 2017-01-01 | End: 2017-01-01 | Stop reason: HOSPADM

## 2017-01-01 RX ORDER — CALCIUM GLUCONATE 94 MG/ML
1 INJECTION, SOLUTION INTRAVENOUS ONCE
Status: DISCONTINUED | OUTPATIENT
Start: 2017-01-01 | End: 2017-01-01

## 2017-01-01 RX ORDER — FLUMAZENIL 0.1 MG/ML
0.2 INJECTION, SOLUTION INTRAVENOUS
Status: DISCONTINUED | OUTPATIENT
Start: 2017-01-01 | End: 2017-01-01 | Stop reason: HOSPADM

## 2017-01-01 RX ORDER — ATROPINE SULFATE 10 MG/ML
2-4 SOLUTION/ DROPS OPHTHALMIC
Qty: 5 ML | Refills: 1
Start: 2017-01-01

## 2017-01-01 RX ORDER — FENTANYL CITRATE 50 UG/ML
50-100 INJECTION, SOLUTION INTRAMUSCULAR; INTRAVENOUS
Status: DISCONTINUED | OUTPATIENT
Start: 2017-01-01 | End: 2017-01-01 | Stop reason: HOSPADM

## 2017-01-01 RX ORDER — LORAZEPAM 2 MG/ML
.25-.5 INJECTION INTRAMUSCULAR EVERY 6 HOURS
Status: DISCONTINUED | OUTPATIENT
Start: 2017-01-01 | End: 2017-01-01

## 2017-01-01 RX ORDER — LORAZEPAM 0.5 MG/1
.25-.5 TABLET ORAL EVERY 6 HOURS PRN
Status: DISCONTINUED | OUTPATIENT
Start: 2017-01-01 | End: 2017-01-01 | Stop reason: HOSPADM

## 2017-01-01 RX ORDER — HYDROMORPHONE HYDROCHLORIDE 1 MG/ML
1-2 SOLUTION ORAL
Status: DISCONTINUED | OUTPATIENT
Start: 2017-01-01 | End: 2017-01-01 | Stop reason: HOSPADM

## 2017-01-01 RX ORDER — HALOPERIDOL 5 MG/ML
1-2 INJECTION INTRAMUSCULAR
Status: DISCONTINUED | OUTPATIENT
Start: 2017-01-01 | End: 2017-01-01 | Stop reason: HOSPADM

## 2017-01-01 RX ORDER — PROPOFOL 10 MG/ML
5-75 INJECTION, EMULSION INTRAVENOUS CONTINUOUS
Status: DISCONTINUED | OUTPATIENT
Start: 2017-01-01 | End: 2017-01-01

## 2017-01-01 RX ORDER — HALOPERIDOL 1 MG/1
1-2 TABLET ORAL EVERY 6 HOURS PRN
Qty: 30 TABLET | Refills: 1
Start: 2017-01-01

## 2017-01-01 RX ORDER — OCTREOTIDE ACETATE 50 UG/ML
50 INJECTION, SOLUTION INTRAVENOUS; SUBCUTANEOUS ONCE
Status: COMPLETED | OUTPATIENT
Start: 2017-01-01 | End: 2017-01-01

## 2017-01-01 RX ORDER — ONDANSETRON 2 MG/ML
4 INJECTION INTRAMUSCULAR; INTRAVENOUS EVERY 6 HOURS PRN
Status: DISCONTINUED | OUTPATIENT
Start: 2017-01-01 | End: 2017-01-01 | Stop reason: HOSPADM

## 2017-01-01 RX ORDER — HYDROMORPHONE HYDROCHLORIDE 1 MG/ML
1-2 SOLUTION ORAL
Qty: 30 ML | Refills: 0
Start: 2017-01-01

## 2017-01-01 RX ORDER — ROCURONIUM BROMIDE 50 MG/5 ML
60 SYRINGE (ML) INTRAVENOUS ONCE
Status: DISCONTINUED | OUTPATIENT
Start: 2017-01-01 | End: 2017-01-01

## 2017-01-01 RX ORDER — NALOXONE HYDROCHLORIDE 0.4 MG/ML
.1-.4 INJECTION, SOLUTION INTRAMUSCULAR; INTRAVENOUS; SUBCUTANEOUS
Status: ACTIVE | OUTPATIENT
Start: 2017-01-01 | End: 2017-01-01

## 2017-01-01 RX ORDER — ACETAMINOPHEN 325 MG/1
650 TABLET ORAL EVERY 6 HOURS PRN
Status: DISCONTINUED | OUTPATIENT
Start: 2017-01-01 | End: 2017-01-01 | Stop reason: HOSPADM

## 2017-01-01 RX ADMIN — PANTOPRAZOLE SODIUM 40 MG: 40 TABLET, DELAYED RELEASE ORAL at 07:52

## 2017-01-01 RX ADMIN — CHLORHEXIDINE GLUCONATE 15 ML: 1.2 RINSE ORAL at 08:57

## 2017-01-01 RX ADMIN — CALCIUM GLUCONATE 2 G: 94 INJECTION, SOLUTION INTRAVENOUS at 07:55

## 2017-01-01 RX ADMIN — OCTREOTIDE ACETATE 50 MCG/HR: 200 INJECTION, SOLUTION INTRAVENOUS; SUBCUTANEOUS at 22:18

## 2017-01-01 RX ADMIN — LORAZEPAM 2 MG: 2 INJECTION INTRAMUSCULAR; INTRAVENOUS at 17:48

## 2017-01-01 RX ADMIN — CHLORHEXIDINE GLUCONATE 15 ML: 1.2 RINSE ORAL at 23:13

## 2017-01-01 RX ADMIN — SODIUM CHLORIDE 80 MG: 9 INJECTION, SOLUTION INTRAVENOUS at 18:08

## 2017-01-01 RX ADMIN — FOLIC ACID: 5 INJECTION, SOLUTION INTRAMUSCULAR; INTRAVENOUS; SUBCUTANEOUS at 22:30

## 2017-01-01 RX ADMIN — FOLIC ACID: 5 INJECTION, SOLUTION INTRAMUSCULAR; INTRAVENOUS; SUBCUTANEOUS at 00:23

## 2017-01-01 RX ADMIN — MIDAZOLAM HYDROCHLORIDE 2 MG: 1 INJECTION, SOLUTION INTRAMUSCULAR; INTRAVENOUS at 13:11

## 2017-01-01 RX ADMIN — PREDNISOLONE SODIUM PHOSPHATE 40 MG: 15 SOLUTION ORAL at 12:14

## 2017-01-01 RX ADMIN — SODIUM CHLORIDE: 9 INJECTION, SOLUTION INTRAVENOUS at 05:09

## 2017-01-01 RX ADMIN — CHLORHEXIDINE GLUCONATE 15 ML: 1.2 RINSE ORAL at 01:33

## 2017-01-01 RX ADMIN — INSULIN ASPART 1 UNITS: 100 INJECTION, SOLUTION INTRAVENOUS; SUBCUTANEOUS at 11:29

## 2017-01-01 RX ADMIN — VASOPRESSIN 2.4 UNITS/HR: 20 INJECTION INTRAVENOUS at 00:11

## 2017-01-01 RX ADMIN — PROPOFOL 5 MCG/KG/MIN: 10 INJECTION, EMULSION INTRAVENOUS at 17:55

## 2017-01-01 RX ADMIN — ONDANSETRON 4 MG: 4 TABLET, ORALLY DISINTEGRATING ORAL at 14:20

## 2017-01-01 RX ADMIN — PANTOPRAZOLE SODIUM 40 MG: 40 TABLET, DELAYED RELEASE ORAL at 07:26

## 2017-01-01 RX ADMIN — PANTOPRAZOLE SODIUM 40 MG: 40 TABLET, DELAYED RELEASE ORAL at 16:19

## 2017-01-01 RX ADMIN — HYDROMORPHONE HYDROCHLORIDE 1 MG: 1 SOLUTION ORAL at 14:20

## 2017-01-01 RX ADMIN — PANTOPRAZOLE SODIUM 40 MG: 40 INJECTION, POWDER, FOR SOLUTION INTRAVENOUS at 08:57

## 2017-01-01 RX ADMIN — FOLIC ACID: 5 INJECTION, SOLUTION INTRAMUSCULAR; INTRAVENOUS; SUBCUTANEOUS at 22:46

## 2017-01-01 RX ADMIN — PREDNISOLONE SODIUM PHOSPHATE 40 MG: 15 SOLUTION ORAL at 08:26

## 2017-01-01 RX ADMIN — INSULIN ASPART 1 UNITS: 100 INJECTION, SOLUTION INTRAVENOUS; SUBCUTANEOUS at 19:51

## 2017-01-01 RX ADMIN — PANTOPRAZOLE SODIUM 40 MG: 40 TABLET, DELAYED RELEASE ORAL at 16:35

## 2017-01-01 RX ADMIN — FOLIC ACID: 5 INJECTION, SOLUTION INTRAMUSCULAR; INTRAVENOUS; SUBCUTANEOUS at 23:41

## 2017-01-01 RX ADMIN — SODIUM PHOSPHATE, MONOBASIC, MONOHYDRATE 20 MMOL: 276; 142 INJECTION, SOLUTION INTRAVENOUS at 08:00

## 2017-01-01 RX ADMIN — PHYTONADIONE 10 MG: 10 INJECTION, EMULSION INTRAMUSCULAR; INTRAVENOUS; SUBCUTANEOUS at 20:04

## 2017-01-01 RX ADMIN — DEXTROSE AND SODIUM CHLORIDE: 5; 450 INJECTION, SOLUTION INTRAVENOUS at 17:44

## 2017-01-01 RX ADMIN — Medication 0.5 MG: at 09:58

## 2017-01-01 RX ADMIN — METHYLPREDNISOLONE SODIUM SUCCINATE 40 MG: 40 INJECTION, POWDER, FOR SOLUTION INTRAMUSCULAR; INTRAVENOUS at 17:40

## 2017-01-01 RX ADMIN — METHYLPREDNISOLONE SODIUM SUCCINATE 40 MG: 40 INJECTION, POWDER, FOR SOLUTION INTRAMUSCULAR; INTRAVENOUS at 17:50

## 2017-01-01 RX ADMIN — INSULIN ASPART 1 UNITS: 100 INJECTION, SOLUTION INTRAVENOUS; SUBCUTANEOUS at 23:52

## 2017-01-01 RX ADMIN — MULTIPLE VITAMINS W/ MINERALS TAB 1 TABLET: TAB at 08:38

## 2017-01-01 RX ADMIN — Medication 60 MG: at 17:40

## 2017-01-01 RX ADMIN — CALCIUM GLUCONATE 2 G: 94 INJECTION, SOLUTION INTRAVENOUS at 09:18

## 2017-01-01 RX ADMIN — INSULIN ASPART 1 UNITS: 100 INJECTION, SOLUTION INTRAVENOUS; SUBCUTANEOUS at 15:40

## 2017-01-01 RX ADMIN — MAGNESIUM SULFATE IN WATER 4 G: 40 INJECTION, SOLUTION INTRAVENOUS at 09:36

## 2017-01-01 RX ADMIN — SODIUM CHLORIDE: 9 INJECTION, SOLUTION INTRAVENOUS at 15:54

## 2017-01-01 RX ADMIN — INSULIN ASPART 1 UNITS: 100 INJECTION, SOLUTION INTRAVENOUS; SUBCUTANEOUS at 04:17

## 2017-01-01 RX ADMIN — SODIUM CHLORIDE: 9 INJECTION, SOLUTION INTRAVENOUS at 02:05

## 2017-01-01 RX ADMIN — PROPOFOL 25 MCG/KG/MIN: 10 INJECTION, EMULSION INTRAVENOUS at 12:47

## 2017-01-01 RX ADMIN — PANTOPRAZOLE SODIUM 40 MG: 40 TABLET, DELAYED RELEASE ORAL at 06:18

## 2017-01-01 RX ADMIN — OCTREOTIDE ACETATE 50 MCG: 50 INJECTION, SOLUTION INTRAVENOUS; SUBCUTANEOUS at 18:43

## 2017-01-01 RX ADMIN — LACTULOSE 200 G: 10 SOLUTION ORAL; RECTAL at 18:23

## 2017-01-01 RX ADMIN — DEXTROSE AND SODIUM CHLORIDE: 5; 450 INJECTION, SOLUTION INTRAVENOUS at 04:32

## 2017-01-01 RX ADMIN — CALCIUM GLUCONATE 1 G: 94 INJECTION, SOLUTION INTRAVENOUS at 17:39

## 2017-01-01 RX ADMIN — INSULIN ASPART 1 UNITS: 100 INJECTION, SOLUTION INTRAVENOUS; SUBCUTANEOUS at 08:05

## 2017-01-01 RX ADMIN — OCTREOTIDE ACETATE 50 MCG/HR: 200 INJECTION, SOLUTION INTRAVENOUS; SUBCUTANEOUS at 18:40

## 2017-01-01 RX ADMIN — PANTOPRAZOLE SODIUM 40 MG: 40 TABLET, DELAYED RELEASE ORAL at 06:19

## 2017-01-01 RX ADMIN — CALCIUM GLUCONATE 1 G: 94 INJECTION, SOLUTION INTRAVENOUS at 23:49

## 2017-01-01 RX ADMIN — PANTOPRAZOLE SODIUM 40 MG: 40 INJECTION, POWDER, FOR SOLUTION INTRAVENOUS at 23:13

## 2017-01-01 RX ADMIN — NOREPINEPHRINE BITARTRATE 0.05 MCG/KG/MIN: 1 INJECTION INTRAVENOUS at 22:18

## 2017-01-01 RX ADMIN — PROPOFOL 5 MCG/KG/MIN: 10 INJECTION, EMULSION INTRAVENOUS at 01:41

## 2017-01-01 RX ADMIN — DEXTROSE AND SODIUM CHLORIDE: 5; 450 INJECTION, SOLUTION INTRAVENOUS at 14:15

## 2017-01-01 RX ADMIN — HYDROMORPHONE HYDROCHLORIDE 2 MG: 1 SOLUTION ORAL at 13:22

## 2017-01-01 RX ADMIN — Medication 0.3 MG: at 16:35

## 2017-01-01 RX ADMIN — SODIUM CHLORIDE 500 ML: 9 INJECTION, SOLUTION INTRAVENOUS at 06:49

## 2017-01-01 RX ADMIN — CEFTRIAXONE 1 G: 1 INJECTION, POWDER, FOR SOLUTION INTRAMUSCULAR; INTRAVENOUS at 12:19

## 2017-01-01 RX ADMIN — CALCIUM GLUCONATE 1 G/HR: 94 INJECTION, SOLUTION INTRAVENOUS at 07:10

## 2017-01-01 RX ADMIN — NOREPINEPHRINE BITARTRATE 0.26 MCG/KG/MIN: 1 INJECTION INTRAVENOUS at 09:29

## 2017-01-01 RX ADMIN — SODIUM CHLORIDE 8 MG/HR: 9 INJECTION, SOLUTION INTRAVENOUS at 02:24

## 2017-01-01 RX ADMIN — PANTOPRAZOLE SODIUM 40 MG: 40 INJECTION, POWDER, FOR SOLUTION INTRAVENOUS at 12:01

## 2017-01-01 RX ADMIN — PREDNISOLONE SODIUM PHOSPHATE 40 MG: 15 SOLUTION ORAL at 10:07

## 2017-01-01 RX ADMIN — INSULIN ASPART 1 UNITS: 100 INJECTION, SOLUTION INTRAVENOUS; SUBCUTANEOUS at 20:40

## 2017-01-01 RX ADMIN — SODIUM CHLORIDE 8 MG/HR: 9 INJECTION, SOLUTION INTRAVENOUS at 15:54

## 2017-01-01 RX ADMIN — POTASSIUM PHOSPHATE, MONOBASIC AND POTASSIUM PHOSPHATE, DIBASIC 20 MMOL: 224; 236 INJECTION, SOLUTION, CONCENTRATE INTRAVENOUS at 13:38

## 2017-01-01 RX ADMIN — CEFTRIAXONE SODIUM 2 G: 2 INJECTION, POWDER, FOR SOLUTION INTRAMUSCULAR; INTRAVENOUS at 18:55

## 2017-01-01 RX ADMIN — PANTOPRAZOLE SODIUM 40 MG: 40 TABLET, DELAYED RELEASE ORAL at 06:59

## 2017-01-01 RX ADMIN — CEFTRIAXONE 1 G: 1 INJECTION, POWDER, FOR SOLUTION INTRAMUSCULAR; INTRAVENOUS at 11:29

## 2017-01-01 RX ADMIN — DEXTROSE AND SODIUM CHLORIDE: 5; 450 INJECTION, SOLUTION INTRAVENOUS at 23:34

## 2017-01-01 RX ADMIN — SODIUM CHLORIDE: 9 INJECTION, SOLUTION INTRAVENOUS at 15:56

## 2017-01-01 RX ADMIN — INSULIN ASPART 1 UNITS: 100 INJECTION, SOLUTION INTRAVENOUS; SUBCUTANEOUS at 08:57

## 2017-01-01 RX ADMIN — PROPOFOL 25 MCG/KG/MIN: 10 INJECTION, EMULSION INTRAVENOUS at 21:55

## 2017-01-01 RX ADMIN — INSULIN ASPART 1 UNITS: 100 INJECTION, SOLUTION INTRAVENOUS; SUBCUTANEOUS at 12:03

## 2017-01-01 RX ADMIN — SODIUM CHLORIDE 1000 ML: 9 INJECTION, SOLUTION INTRAVENOUS at 01:27

## 2017-01-01 RX ADMIN — VASOPRESSIN 2.4 UNITS/HR: 20 INJECTION INTRAVENOUS at 13:01

## 2017-01-01 RX ADMIN — CHLORHEXIDINE GLUCONATE 15 ML: 1.2 RINSE ORAL at 08:37

## 2017-01-01 RX ADMIN — SODIUM CHLORIDE: 9 INJECTION, SOLUTION INTRAVENOUS at 00:10

## 2017-01-01 RX ADMIN — VASOPRESSIN 2.4 UNITS/HR: 20 INJECTION INTRAVENOUS at 00:15

## 2017-01-01 RX ADMIN — CEFTRIAXONE 1 G: 1 INJECTION, POWDER, FOR SOLUTION INTRAMUSCULAR; INTRAVENOUS at 11:51

## 2017-01-01 RX ADMIN — INSULIN ASPART 1 UNITS: 100 INJECTION, SOLUTION INTRAVENOUS; SUBCUTANEOUS at 12:04

## 2017-01-01 RX ADMIN — CALCIUM GLUCONATE 2 G: 94 INJECTION, SOLUTION INTRAVENOUS at 12:22

## 2017-01-01 RX ADMIN — SODIUM CHLORIDE 500 ML: 9 INJECTION, SOLUTION INTRAVENOUS at 03:34

## 2017-01-01 RX ADMIN — SODIUM CHLORIDE 2000 ML: 9 INJECTION, SOLUTION INTRAVENOUS at 17:23

## 2017-01-01 RX ADMIN — CEFTRIAXONE 1 G: 1 INJECTION, POWDER, FOR SOLUTION INTRAMUSCULAR; INTRAVENOUS at 11:48

## 2017-01-01 RX ADMIN — PANTOPRAZOLE SODIUM 40 MG: 40 INJECTION, POWDER, FOR SOLUTION INTRAVENOUS at 09:18

## 2017-01-01 RX ADMIN — ETOMIDATE 20 MG: 2 INJECTION, SOLUTION INTRAVENOUS at 17:34

## 2017-01-01 RX ADMIN — PANTOPRAZOLE SODIUM 40 MG: 40 INJECTION, POWDER, FOR SOLUTION INTRAVENOUS at 21:56

## 2017-01-01 RX ADMIN — PROPOFOL 25 MCG/KG/MIN: 10 INJECTION, EMULSION INTRAVENOUS at 05:08

## 2017-01-01 RX ADMIN — CALCIUM GLUCONATE 1 G: 94 INJECTION, SOLUTION INTRAVENOUS at 20:13

## 2017-01-01 RX ADMIN — LACTULOSE 200 G: 10 SOLUTION ORAL; RECTAL at 22:41

## 2017-01-01 RX ADMIN — NOREPINEPHRINE BITARTRATE 0.32 MCG/KG/MIN: 1 INJECTION INTRAVENOUS at 18:38

## 2017-01-01 RX ADMIN — PANTOPRAZOLE SODIUM 40 MG: 40 TABLET, DELAYED RELEASE ORAL at 15:52

## 2017-01-01 RX ADMIN — VASOPRESSIN 2.4 UNITS/HR: 20 INJECTION INTRAVENOUS at 07:58

## 2017-01-01 RX ADMIN — METHYLPREDNISOLONE SODIUM SUCCINATE 40 MG: 40 INJECTION, POWDER, FOR SOLUTION INTRAMUSCULAR; INTRAVENOUS at 18:53

## 2017-01-01 RX ADMIN — NOREPINEPHRINE BITARTRATE 0.05 MCG/KG/MIN: 1 INJECTION INTRAVENOUS at 19:46

## 2017-01-01 RX ADMIN — PANTOPRAZOLE SODIUM 40 MG: 40 INJECTION, POWDER, FOR SOLUTION INTRAVENOUS at 21:45

## 2017-01-01 RX ADMIN — POTASSIUM PHOSPHATE, MONOBASIC AND POTASSIUM PHOSPHATE, DIBASIC 15 MMOL: 224; 236 INJECTION, SOLUTION, CONCENTRATE INTRAVENOUS at 15:28

## 2017-01-01 RX ADMIN — POTASSIUM PHOSPHATE, MONOBASIC AND POTASSIUM PHOSPHATE, DIBASIC 15 MMOL: 224; 236 INJECTION, SOLUTION, CONCENTRATE INTRAVENOUS at 10:12

## 2017-01-01 RX ADMIN — DEXTROSE AND SODIUM CHLORIDE: 5; 450 INJECTION, SOLUTION INTRAVENOUS at 12:22

## 2017-01-01 RX ADMIN — POTASSIUM PHOSPHATE, MONOBASIC AND POTASSIUM PHOSPHATE, DIBASIC 20 MMOL: 224; 236 INJECTION, SOLUTION, CONCENTRATE INTRAVENOUS at 10:48

## 2017-01-01 RX ADMIN — DEXTROSE AND SODIUM CHLORIDE: 5; 450 INJECTION, SOLUTION INTRAVENOUS at 09:28

## 2017-01-01 RX ADMIN — SODIUM CHLORIDE 1000 ML: 9 INJECTION, SOLUTION INTRAVENOUS at 19:09

## 2017-01-01 RX ADMIN — NOREPINEPHRINE BITARTRATE 0.09 MCG/KG/MIN: 1 INJECTION INTRAVENOUS at 12:44

## 2017-01-01 RX ADMIN — Medication 0.2 MG: at 10:31

## 2017-01-01 RX ADMIN — OCTREOTIDE ACETATE 50 MCG/HR: 200 INJECTION, SOLUTION INTRAVENOUS; SUBCUTANEOUS at 18:42

## 2017-01-01 RX ADMIN — INSULIN ASPART 1 UNITS: 100 INJECTION, SOLUTION INTRAVENOUS; SUBCUTANEOUS at 00:00

## 2017-01-01 RX ADMIN — PANTOPRAZOLE SODIUM 40 MG: 40 INJECTION, POWDER, FOR SOLUTION INTRAVENOUS at 08:37

## 2017-01-01 RX ADMIN — METHYLPREDNISOLONE SODIUM SUCCINATE 40 MG: 40 INJECTION, POWDER, FOR SOLUTION INTRAMUSCULAR; INTRAVENOUS at 18:40

## 2017-01-01 RX ADMIN — SODIUM CHLORIDE 8 MG/HR: 9 INJECTION, SOLUTION INTRAVENOUS at 18:06

## 2017-01-01 RX ADMIN — PANTOPRAZOLE SODIUM 40 MG: 40 TABLET, DELAYED RELEASE ORAL at 17:18

## 2017-01-01 ASSESSMENT — ACTIVITIES OF DAILY LIVING (ADL): PREVIOUS_RESPONSIBILITIES: LAUNDRY;SHOPPING;MEDICATION MANAGEMENT;FINANCES;DRIVING

## 2017-09-24 PROBLEM — K92.2 GI HEMORRHAGE: Status: ACTIVE | Noted: 2017-01-01

## 2017-09-24 NOTE — IP AVS SNAPSHOT
[place] [address] [facility]    [address]    [facility] [address]    Phone:  [phone]    Fax:  [phone]                                       After Visit Summary   9/24/2017    Jon Toribio    MRN: [id number]           After Visit Summary Signature Page     I have received my discharge instructions, and my questions have been answered. I have discussed any challenges I see with this plan with the nurse or doctor.    ..........................................................................................................................................  Patient/Patient Representative Signature      ..........................................................................................................................................  Patient Representative Print Name and Relationship to Patient    ..................................................               ................................................  Date                                            Time    ..........................................................................................................................................  Reviewed by Signature/Title    ...................................................              ..............................................  Date                                                            Time

## 2017-09-24 NOTE — IP AVS SNAPSHOT
MRN:[id number]                      After Visit Summary   9/24/2017    Jon Toribio    MRN: [id number]           Thank you!     Thank you for choosing [facility] for your care. Our goal is always to provide you with excellent care. Hearing back from our patients is one way we can continue to improve our services. Please take a few minutes to complete the written survey that you may receive in the mail after you visit. If you would like to speak to someone directly about your visit please contact Patient Relations at [phone]. Thank you!          Patient Information     Date Of Birth          1955        About your hospital stay     You were admitted on:  September 24, 2017 You last received care in the:  [place] [address] [facility]    You were discharged on:  October 4, 2017        Reason for your hospital stay       Please refer to discharge summary. Briefly patient admitted with acute on chronic liver failure, encephalopathy, Upper GI Bleed. Given his terminal illness and poor prognosis patient and his family decided to go home on hospice                  Who to Call     For medical emergencies, please call 911.  For non-urgent questions about your medical care, please call your primary care provider or clinic, None  For questions related to your surgery, please call your surgery clinic        Attending Provider     Provider Specialty    Jonn Smith MD Emergency Medicine    [facility]       Primary Care Provider    None Specified      After Care Instructions     Activity       Your activity upon discharge: activity as tolerated            Diet       Follow this diet upon discharge: Orders Placed This Encounter      Room Service      Snacks/Supplements Adult: Ensure Plus (Adult); With Meals      High Kcal/High Protein Diet, ADULT                  Follow-up Appointments     Follow-up and recommended labs and tests    "     Home Hospice to follow up for comfort measures                  Further instructions from your care team       Continue oral nutrition supplements at d/c until oral intake returns to baseline    Pending Results     Date and Time Order Name Status Description    10/1/2017 0819 Blood culture Preliminary     10/1/2017 0819 Blood culture Preliminary     2017 1832 Plasma prepare order unit In process             Statement of Approval     Ordered          10/04/17 1358  I have reviewed and agree with all the recommendations and orders detailed in this document.  EFFECTIVE NOW     Approved and electronically signed by:  Kym Chicas MD             Admission Information     Date & Time Provider Department Dept. Phone    2017 Charlie Choi MD [place] [address] [facility] [phone]      Your Vitals Were     Blood Pressure Pulse Temperature Respirations Weight Pulse Oximetry    94/57 (BP Location: Left arm) 95 96.4  F (35.8  C) (Oral) 28 63.3 kg (139 lb 9.6 oz) 91%      MyChart Information     [employer] lets you send messages to your doctor, view your test results, renew your prescriptions, schedule appointments and more. To sign up, go to [email or web address] . Click on \"Log in\" on the left side of the screen, which will take you to the Welcome page. Then click on \"Sign up Now\" on the right side of the page.     You will be asked to enter the access code listed below, as well as some personal information. Please follow the directions to create your username and password.     Your access code is: [email or web address]  Expires: 2018 10:37 AM     Your access code will  in 90 days. If you need help or a new code, please call your [facility] clinic or [phone].        Care EveryWhere ID     This is your Care EveryWhere ID. This could be used by other organizations to access your [facility] medical records  [id number]        Equal Access to Services     EFREN COTTON AH: Ramona rivera " zari Sepulveda, waaxda luqadaha, qaybta kaalmada ademoira, skyler ettain hayaadoris nixonaftab benysheron la'minervadoris magalys. So [facility] [phone].    ATENCIÓN: Si habla español, tiene a mcclendon disposición servicios gratuitos de asistencia lingüística. Giovana al [phone].    We comply with applicable federal civil rights laws and [place] laws. We do not discriminate on the basis of race, color, national origin, age, disability, sex, sexual orientation, or gender identity.               Review of your medicines      START taking        Dose / Directions    acetaminophen 650 MG Suppository   Commonly known as:  TYLENOL   Used for:  Hospice care patient        Dose:  650 mg   Place 1 suppository (650 mg) rectally every 6 hours as needed for mild pain or fever (temperature over 100? F )   Quantity:  10 suppository   Refills:  0       atropine 1 % ophthalmic solution   Used for:  Hospice care patient        Dose:  2-4 drop   Take 2-4 drops by mouth, place under tongue or place inside cheek every 2 hours as needed for other (terminal respiratory secretions) Not for ophthalmic use.   Quantity:  5 mL   Refills:  1       bisacodyl 10 MG Suppository   Commonly known as:  DULCOLAX   Used for:  Hospice care patient        Dose:  10 mg   Place 1 suppository (10 mg) rectally daily as needed for other (for no bowel movement for 72 hours)   Quantity:  10 suppository   Refills:  0       haloperidol 1 MG tablet   Commonly known as:  HALDOL   Used for:  Hospice care patient        Dose:  1-2 mg   Take 1-2 tablets (1-2 mg) by mouth, place under tongue or insert rectally every 6 hours as needed for agitation (nausea)   Quantity:  30 tablet   Refills:  1       HYDROmorphone (HIGH CONC) 10 mg/mL Liqd oral   Commonly known as:  DILAUDID   Used for:  Hospice care patient        Dose:  1-2 mg   Take 0.1-0.2 mLs (1-2 mg) by mouth or place under tongue every 2 hours as needed for moderate to severe pain (and/or??shortness of breath)   Quantity:  30 mL    Refills:  0       LORazepam 0.5 MG tablet   Commonly known as:  ATIVAN   Used for:  Hospice care patient        Dose:  0.25-0.5 mg   Take 0.5-1 tablets (0.25-0.5 mg) by mouth, place under tongue or insert rectally every 3 hours as needed for anxiety   Quantity:  20 tablet   Refills:  0       senna-docusate 8.6-50 MG per tablet   Commonly known as:  SENOKOT-S;PERICOLACE   Used for:  Hospice care patient        Dose:  1 tablet   Take 1 tablet by mouth 2 times daily   Quantity:  100 tablet   Refills:  0            Where to get your medicines      Some of these will need a paper prescription and others can be bought over the counter. Ask your nurse if you have questions.     You don't need a prescription for these medications     acetaminophen 650 MG Suppository    atropine 1 % ophthalmic solution    bisacodyl 10 MG Suppository    haloperidol 1 MG tablet    HYDROmorphone (HIGH CONC) 10 mg/mL Liqd oral    LORazepam 0.5 MG tablet    senna-docusate 8.6-50 MG per tablet                Protect others around you: Learn how to safely use, store and throw away your medicines at www.disposemymeds.org.             Medication List: This is a list of all your medications and when to take them. Check marks below indicate your daily home schedule. Keep this list as a reference.      Medications           Morning Afternoon Evening Bedtime As Needed    acetaminophen 650 MG Suppository   Commonly known as:  TYLENOL   Place 1 suppository (650 mg) rectally every 6 hours as needed for mild pain or fever (temperature over 100? F )                                   atropine 1 % ophthalmic solution   Take 2-4 drops by mouth, place under tongue or place inside cheek every 2 hours as needed for other (terminal respiratory secretions) Not for ophthalmic use.                                   bisacodyl 10 MG Suppository   Commonly known as:  DULCOLAX   Place 1 suppository (10 mg) rectally daily as needed for other (for no bowel movement for 72  hours)                                   haloperidol 1 MG tablet   Commonly known as:  HALDOL   Take 1-2 tablets (1-2 mg) by mouth, place under tongue or insert rectally every 6 hours as needed for agitation (nausea)                                   HYDROmorphone (HIGH CONC) 10 mg/mL Liqd oral   Commonly known as:  DILAUDID   Take 0.1-0.2 mLs (1-2 mg) by mouth or place under tongue every 2 hours as needed for moderate to severe pain (and/or??shortness of breath)                                   LORazepam 0.5 MG tablet   Commonly known as:  ATIVAN   Take 0.5-1 tablets (0.25-0.5 mg) by mouth, place under tongue or insert rectally every 3 hours as needed for anxiety                                   senna-docusate 8.6-50 MG per tablet   Commonly known as:  SENOKOT-S;PERICOLACE   Take 1 tablet by mouth 2 times daily

## 2017-09-24 NOTE — IP AVS SNAPSHOT
[place] [address] [facility]: [phone]                                              INTERAGENCY TRANSFER FORM - PHYSICIAN ORDERS   2017                    Hospital Admission Date: 2017  ROWENA MCKEON   : 1955  Sex: Male        Attending Provider: Charlie Choi MD     Allergies:  No Known Allergies    Infection:  None   Service:  HOSPITALIST    Ht:  --   Wt:  63.3 kg (139 lb 9.6 oz)   Admission Wt:  63.6 kg (140 lb 3.4 oz)    BMI:  --   BSA:  --            Patient PCP Information     None on File      ED Clinical Impression     Diagnosis Description Comment Added By Time Added    Acute upper gastrointestinal hemorrhage [K92.2] Acute upper gastrointestinal hemorrhage [K92.2]  Jonn Smith MD 2017  6:52 PM    Shock (H) [R57.9] Shock (H) [R57.9]  Jonn Smith MD 2017  6:52 PM    Renal failure, unspecified chronicity [N19] Renal failure, unspecified chronicity [N19]  Jonn Smith MD 2017  6:52 PM    Hyperkalemia [E87.5] Hyperkalemia [E87.5]  Jonn Smith MD 2017  6:55 PM    Hyponatremia [E87.1] Hyponatremia [E87.1]  Jonn Smith MD 2017  6:55 PM    Lactic acidosis [E87.2] Lactic acidosis [E87.2]  Jonn Smith MD 2017  6:55 PM    Encephalopathy [G93.40] Encephalopathy [G93.40]  Jonn Smith MD 2017  6:56 PM    Liver failure without hepatic coma, unspecified chronicity (H) [K72.90] Liver failure without hepatic coma, unspecified chronicity (H) [K72.90]  Jonn Smith MD 2017  8:05 PM    Hypoglycemia [E16.2] Hypoglycemia [E16.2]  Jonn Smith MD 2017  8:35 PM      Hospital Problems as of 10/4/2017              Priority Class Noted POA    GI hemorrhage Medium  2017 Yes      Non-Hospital Problems as of 10/4/2017              Priority Class Noted    Hyperlipidemia Medium  Unknown      Code Status History     Date Active Date Inactive Code Status  Order ID Comments User Context    10/4/2017 12:44 PM  DNR/DNI [id number]  Kym Chicas MD Outpatient    10/3/2017  9:33 AM 10/4/2017 12:44 PM DNR/DNI [id number] See record of discussion in progress note Abeba Gilbert APRN CNS Inpatient    9/24/2017 10:03 PM 10/3/2017  9:33 AM Full Code [id number]  Charlie Choi MD Inpatient         Medication Review      START taking        Dose / Directions Comments    acetaminophen 650 MG Suppository   Commonly known as:  TYLENOL   Used for:  Hospice care patient        Dose:  650 mg   Place 1 suppository (650 mg) rectally every 6 hours as needed for mild pain or fever (temperature over 100? F )   Quantity:  10 suppository   Refills:  0        atropine 1 % ophthalmic solution   Used for:  Hospice care patient        Dose:  2-4 drop   Take 2-4 drops by mouth, place under tongue or place inside cheek every 2 hours as needed for other (terminal respiratory secretions) Not for ophthalmic use.   Quantity:  5 mL   Refills:  1        bisacodyl 10 MG Suppository   Commonly known as:  DULCOLAX   Used for:  Hospice care patient        Dose:  10 mg   Place 1 suppository (10 mg) rectally daily as needed for other (for no bowel movement for 72 hours)   Quantity:  10 suppository   Refills:  0        haloperidol 1 MG tablet   Commonly known as:  HALDOL   Used for:  Hospice care patient        Dose:  1-2 mg   Take 1-2 tablets (1-2 mg) by mouth, place under tongue or insert rectally every 6 hours as needed for agitation (nausea)   Quantity:  30 tablet   Refills:  1        HYDROmorphone (HIGH CONC) 10 mg/mL Liqd oral   Commonly known as:  DILAUDID   Used for:  Hospice care patient        Dose:  1-2 mg   Take 0.1-0.2 mLs (1-2 mg) by mouth or place under tongue every 2 hours as needed for moderate to severe pain (and/or??shortness of breath)   Quantity:  30 mL   Refills:  0        LORazepam 0.5 MG tablet   Commonly known as:  ATIVAN   Used for:  Hospice care patient         Dose:  0.25-0.5 mg   Take 0.5-1 tablets (0.25-0.5 mg) by mouth, place under tongue or insert rectally every 3 hours as needed for anxiety   Quantity:  20 tablet   Refills:  0        senna-docusate 8.6-50 MG per tablet   Commonly known as:  SENOKOT-S;PERICOLACE   Used for:  Hospice care patient        Dose:  1 tablet   Take 1 tablet by mouth 2 times daily   Quantity:  100 tablet   Refills:  0                  Further instructions from your care team       Continue oral nutrition supplements at d/c until oral intake returns to baseline    Summary of Visit     Reason for your hospital stay       Please refer to discharge summary. Briefly patient admitted with acute on chronic liver failure, encephalopathy, Upper GI Bleed. Given his terminal illness and poor prognosis patient and his family decided to go home on hospice             After Care     Activity       Your activity upon discharge: activity as tolerated       Diet       Follow this diet upon discharge: Orders Placed This Encounter      Room Service      Snacks/Supplements Adult: Ensure Plus (Adult); With Meals      High Kcal/High Protein Diet, ADULT             Follow-Up Appointment Instructions     Future Labs/Procedures    Follow-up and recommended labs and tests      Comments:    Home Hospice to follow up for comfort measures      Follow-Up Appointment Instructions     Follow-up and recommended labs and tests        Home Hospice to follow up for comfort measures             Statement of Approval     Ordered          10/04/17 1358  I have reviewed and agree with all the recommendations and orders detailed in this document.  EFFECTIVE NOW     Approved and electronically signed by:  Kym Chicas MD

## 2017-09-24 NOTE — ED NOTES
Found unresponsive by family, BS 39 by EMS, D50 given.  upon arrival. Pt has spontaneous respirations, remains unresponsive. Has back board and cervical collar.

## 2017-09-24 NOTE — IP AVS SNAPSHOT
[place] [address] MEDICAL SURGICAL: [phone]                                              INTERAGENCY TRANSFER FORM - LAB / IMAGING / EKG / EMG RESULTS   2017                    Hospital Admission Date: 2017  ROWENA MCKEON   : 1955  Sex: Male        Attending Provider: Charlie Choi MD     Allergies:  No Known Allergies    Infection:  None   Service:  HOSPITALIST    Ht:  --   Wt:  63.3 kg (139 lb 9.6 oz)   Admission Wt:  63.6 kg (140 lb 3.4 oz)    BMI:  --   BSA:  --            Patient PCP Information     None on File         Lab Results - 3 Days      Blood culture [[id number]]  Resulted: 10/04/17 0506, Result status: Preliminary result    Ordering provider: Keith Jefferson MD  10/01/17 0819 Resulting lab: [facility]    Specimen Information    Type Source Collected On   Left Hand  10/01/17 0903          Components       Value Reference Range Flag Lab   Specimen Description Left Hand      Special Requests Aerobic and anaerobic bottles received   FrRdHs   Culture Micro No growth after 3 days   75            Blood culture [[id number]]  Resulted: 10/04/17 0506, Result status: Preliminary result    Ordering provider: Keith Jefferson MD  10/01/17 0819 Resulting lab: [facility]    Specimen Information    Type Source Collected On   Left Arm  10/01/17 0855          Components       Value Reference Range Flag Lab   Specimen Description Left Arm      Special Requests Aerobic and anaerobic bottles received   FrRdHs   Culture Micro No growth after 3 days   75            Urine Culture Aerobic Bacterial [[id number]] (Abnormal)  Resulted: 10/03/17 2234, Result status: Final result    Ordering provider: Charlie Choi MD  10/01/17 1400 Resulting lab: [facility]    Specimen Information    Type Source Collected On   Midstream Urine  10/01/17 1400          Components       Value Reference  Range Flag Lab   Specimen Description Midstream Urine      Special Requests Specimen received in preservative   75   Culture Micro --  A 226   Result:         10,000 to 50,000 colonies/mL  Enterococcus faecalis     Culture Micro --   226   Result:         <10,000 colonies/mL  urogenital sonya  Susceptibility testing not routinely done              Comprehensive metabolic panel [[id number]] (Abnormal)  Resulted: 10/03/17 0754, Result status: Final result    Ordering provider: Giovanna Quintero MD  10/03/17 0001 Resulting lab: [facility]    Specimen Information    Type Source Collected On   Blood  10/03/17 0630          Components       Value Reference Range Flag Lab   Sodium 131 133 - 144 mmol/L L FrRdHs   Potassium 4.8 3.4 - 5.3 mmol/L  FrRdHs   Chloride 99 94 - 109 mmol/L  FrRdHs   Carbon Dioxide 22 20 - 32 mmol/L  FrRdHs   Anion Gap 10 3 - 14 mmol/L  FrRdHs   Glucose 67 70 - 99 mg/dL L FrRdHs   Urea Nitrogen 52 7 - 30 mg/dL H FrRdHs   Creatinine 2.03 0.66 - 1.25 mg/dL H FrRdHs   GFR Estimate 33 >60 mL/min/1.7m2 L FrRdHs   Comment:  Non  GFR Calc   GFR Estimate If Black 41 >60 mL/min/1.7m2 L FrRdHs   Comment:  African American GFR Calc   Calcium 7.3 8.5 - 10.1 mg/dL L FrRdHs   Bilirubin Total 23.3 0.2 - 1.3 mg/dL HH FrRdHs   Comment:  Consistent with previous critical result   Albumin 1.5 3.4 - 5.0 g/dL L FrRdHs   Protein Total 4.7 6.8 - 8.8 g/dL L FrRdHs   Alkaline Phosphatase 454 40 - 150 U/L H FrRdHs   ALT 84 0 - 70 U/L H FrRdHs    0 - 45 U/L H FrRdHs            Magnesium [[id number]]  Resulted: 10/03/17 0754, Result status: Final result    Ordering provider: Giovanna Quintero MD  10/03/17 0630 Resulting lab: [facility]    Specimen Information    Type Source Collected On     10/03/17 0630          Components       Value Reference Range Flag Lab   Magnesium 1.9 1.6 - 2.3 mg/dL  FrRdHs            CBC with platelets [[id number]] (Abnormal)  Resulted: 10/03/17 0722, Result status:  Final result    Ordering provider: Giovanna Quintero MD  10/03/17 0001 Resulting lab: [facility]    Specimen Information    Type Source Collected On   Blood  10/03/17 0630          Components       Value Reference Range Flag Lab   WBC 28.1 4.0 - 11.0 10e9/L H FrRdHs   RBC Count 2.61 4.4 - 5.9 10e12/L L FrRdHs   Hemoglobin 8.4 13.3 - 17.7 g/dL L FrRdHs   Hematocrit 25.1 40.0 - 53.0 % L FrRdHs   MCV 96 78 - 100 fl  FrRdHs   MCH 32.2 26.5 - 33.0 pg  FrRdHs   MCHC 33.5 31.5 - 36.5 g/dL  FrRdHs   RDW 21.4 10.0 - 15.0 % H FrRdHs   Platelet Count 168 150 - 450 10e9/L  FrRdHs            Lactic acid level STAT [[id number]] (Abnormal)  Resulted: 10/02/17 0955, Result status: Final result    Ordering provider: Charlie Choi MD  10/02/17 0918 Resulting lab: [facility]    Specimen Information    Type Source Collected On   Blood  10/02/17 0941          Components       Value Reference Range Flag Lab   Lactic Acid 2.2 0.7 - 2.0 mmol/L H FrRdHs            Comprehensive metabolic panel [[id number]] (Abnormal)  Resulted: 10/02/17 0757, Result status: Final result    Ordering provider: Keith Jefferson MD  10/02/17 0000 Resulting lab: [facility]    Specimen Information    Type Source Collected On   Blood  10/02/17 0641          Components       Value Reference Range Flag Lab   Sodium 132 133 - 144 mmol/L L FrRdHs   Potassium 4.8 3.4 - 5.3 mmol/L  FrRdHs   Chloride 99 94 - 109 mmol/L  FrRdHs   Carbon Dioxide 23 20 - 32 mmol/L  FrRdHs   Anion Gap 10 3 - 14 mmol/L  FrRdHs   Glucose 77 70 - 99 mg/dL  FrRdHs   Urea Nitrogen 42 7 - 30 mg/dL H FrRdHs   Creatinine 1.59 0.66 - 1.25 mg/dL H FrRdHs   Comment:  Reviewed: OK with previous   GFR Estimate 44 >60 mL/min/1.7m2 L FrRdHs   Comment:  Non  GFR Calc   GFR Estimate If Black 54 >60 mL/min/1.7m2 L [facility]   Comment:  African American GFR Calc   Calcium 7.9 8.5 - 10.1 mg/dL L [facility]   Bilirubin Total 22.8 0.2 - 1.3 mg/dL HH  FrRdHs   Comment:  Consistent with previous critical result   Albumin 1.6 3.4 - 5.0 g/dL L FrRdHs   Protein Total 4.9 6.8 - 8.8 g/dL L FrRdHs   Comment:  Results confirmed by repeat test   Alkaline Phosphatase 421 40 - 150 U/L H FrRdHs   ALT 88 0 - 70 U/L H FrRdHs    0 - 45 U/L H FrRdHs            Magnesium [[id number]]  Resulted: 10/02/17 0757, Result status: Final result    Ordering provider: Keith Jefferson MD  10/02/17 0641 Resulting lab: [facility]    Specimen Information    Type Source Collected On     10/02/17 0641          Components       Value Reference Range Flag Lab   Magnesium 2.0 1.6 - 2.3 mg/dL  FrRdHs            INR [[id number]] (Abnormal)  Resulted: 10/02/17 0722, Result status: Final result    Ordering provider: Keith Jefferson MD  10/02/17 0000 Resulting lab: [facility]    Specimen Information    Type Source Collected On   Blood  10/02/17 0641          Components       Value Reference Range Flag Lab   INR 1.58 0.86 - 1.14 H FrRdHs            CBC with platelets [[id number]] (Abnormal)  Resulted: 10/02/17 0710, Result status: Final result    Ordering provider: Keith Jefferson MD  10/02/17 0000 Resulting lab: [facility]    Specimen Information    Type Source Collected On   Blood  10/02/17 0641          Components       Value Reference Range Flag Lab   WBC 34.2 4.0 - 11.0 10e9/L H FrRdHs   RBC Count 2.70 4.4 - 5.9 10e12/L L FrRdHs   Hemoglobin 8.8 13.3 - 17.7 g/dL L FrRdHs   Hematocrit 26.4 40.0 - 53.0 % L FrRdHs   MCV 98 78 - 100 fl  FrRdHs   MCH 32.6 26.5 - 33.0 pg  FrRdHs   MCHC 33.3 31.5 - 36.5 g/dL  FrRdHs   RDW 21.7 10.0 - 15.0 % H FrRdHs   Platelet Count 194 150 - 450 10e9/L  FrRdHs            UA with Microscopic reflex to Culture [[id number]] (Abnormal)  Resulted: 10/01/17 1526, Result status: Final result    Ordering provider: Keith Jefferson MD  10/01/17 0819 Resulting lab: [facility]    Specimen  Information    Type Source Collected On   Midstream Urine  10/01/17 1400          Components       Value Reference Range Flag Lab   Color Urine Tonya   FrRdHs   Appearance Urine Cloudy   FrRdHs   Glucose Urine 50 NEG^Negative mg/dL A FrRdHs   Bilirubin Urine Moderate NEG^Negative A FrRdHs   Comment:         This is an unconfirmed screening test result. A positive result may be false.   Ketones Urine Negative NEG^Negative mg/dL  FrRdHs   Specific Gravity Urine 1.017 1.003 - 1.035  FrRdHs   Blood Urine Moderate NEG^Negative A FrRdHs   pH Urine 5.0 5.0 - 7.0 pH  FrRdHs   Protein Albumin Urine Negative NEG^Negative mg/dL  FrRdHs   Urobilinogen mg/dL 4.0 0.0 - 2.0 mg/dL H FrRdHs   Nitrite Urine Negative NEG^Negative  FrRdHs   Leukocyte Esterase Urine Negative NEG^Negative  FrRdHs   Source Midstream Urine   FrRdHs   WBC Urine 17 0 - 2 /HPF H FrRdHs   RBC Urine 2 0 - 2 /HPF  FrRdHs   Bacteria Urine Few NEG^Negative /HPF A FrRdHs   Mucous Urine Present NEG^Negative /LPF A FrRdHs   Hyaline Casts 7 0 - 2 /LPF H FrRdHs   Granular Casts 16 NEG^Negative /LPF A FrRdHs   Cellular Cast 20 NEG^Negative /LPF A FrRdHs   Amorphous Crystals Few NEG^Negative /HPF A FrRdHs            Magnesium [[id number]]  Resulted: 10/01/17 0838, Result status: Final result    Ordering provider: Keith Jefferson MD  10/01/17 0720 Resulting lab: [facility]    Specimen Information    Type Source Collected On     10/01/17 0720          Components       Value Reference Range Flag Lab   Magnesium 1.8 1.6 - 2.3 mg/dL  FrRdHs            Phosphorus [[id number]]  Resulted: 10/01/17 0838, Result status: Final result    Ordering provider: Keith Jefferson MD  10/01/17 0720 Resulting lab: [facility]    Specimen Information    Type Source Collected On     10/01/17 0720          Components       Value Reference Range Flag Lab   Phosphorus 3.0 2.5 - 4.5 mg/dL  FrRdHs            Lactic acid level STAT [[id number]] (Abnormal)   Resulted: 10/01/17 0817, Result status: Final result    Ordering provider: Keith Jefferson MD  10/01/17 0754 Resulting lab: [facility]    Specimen Information    Type Source Collected On   Blood  10/01/17 0810          Components       Value Reference Range Flag Lab   Lactic Acid 2.8 0.7 - 2.0 mmol/L H FrRdHs            Comprehensive metabolic panel [[id number]] (Abnormal)  Resulted: 10/01/17 0806, Result status: Final result    Ordering provider: Keith Jefferson MD  10/01/17 0000 Resulting lab: [facility]    Specimen Information    Type Source Collected On   Blood  10/01/17 0720          Components       Value Reference Range Flag Lab   Sodium 133 133 - 144 mmol/L  FrRdHs   Potassium 4.6 3.4 - 5.3 mmol/L  FrRdHs   Chloride 102 94 - 109 mmol/L  FrRdHs   Carbon Dioxide 22 20 - 32 mmol/L  FrRdHs   Anion Gap 9 3 - 14 mmol/L  FrRdHs   Glucose 72 70 - 99 mg/dL  FrRdHs   Urea Nitrogen 29 7 - 30 mg/dL  FrRdHs   Creatinine 1.11 0.66 - 1.25 mg/dL  FrRdHs   GFR Estimate 67 >60 mL/min/1.7m2  FrRd   Comment:  Non  GFR Calc   GFR Estimate If Black 81 >60 mL/min/1.7m2  FrRd   Comment:  African American GFR Calc   Calcium 7.9 8.5 - 10.1 mg/dL L FrRdHs   Bilirubin Total 20.9 0.2 - 1.3 mg/dL HH FrRdHs   Comment:  Consistent with previous critical result   Albumin 1.6 3.4 - 5.0 g/dL L FrRdHs   Protein Total 4.7 6.8 - 8.8 g/dL L FrRdHs   Alkaline Phosphatase 386 40 - 150 U/L H FrRdHs    0 - 70 U/L H FrRdHs    0 - 45 U/L H FrRdHs            Glucose by meter [[id number]]  Resulted: 10/01/17 0801, Result status: Final result    Ordering provider: Charlie Choi MD  10/01/17 0750 Resulting lab: POINT OF CARE TEST, GLUCOSE    Specimen Information    Type Source Collected On     10/01/17 0750          Components       Value Reference Range Flag Lab   Glucose 70 70 - 99 mg/dL  170            INR [[id number]] (Abnormal)  Resulted: 10/01/17 0747, Result  status: Final result    Ordering provider: Keith Jefferson MD  10/01/17 0000 Resulting lab: [facility]    Specimen Information    Type Source Collected On   Blood  10/01/17 0720          Components       Value Reference Range Flag Lab   INR 2.01 0.86 - 1.14 H FrRdHs            CBC with platelets [[id number]] (Abnormal)  Resulted: 10/01/17 0729, Result status: Final result    Ordering provider: Keith Jefferson MD  10/01/17 0000 Resulting lab: [facility]    Specimen Information    Type Source Collected On   Blood  10/01/17 0720          Components       Value Reference Range Flag Lab   WBC 23.7 4.0 - 11.0 10e9/L H FrRdHs   RBC Count 2.81 4.4 - 5.9 10e12/L L FrRdHs   Hemoglobin 9.2 13.3 - 17.7 g/dL L FrRdHs   Hematocrit 28.1 40.0 - 53.0 % L FrRdHs    78 - 100 fl  FrRdHs   MCH 32.7 26.5 - 33.0 pg  FrRdHs   MCHC 32.7 31.5 - 36.5 g/dL  FrRdHs   RDW 21.4 10.0 - 15.0 % H FrRdHs   Platelet Count 154 150 - 450 10e9/L  FrRdHs            Testing Performed By     Lab - Abbreviation Name Director Address Valid Date Range    12 - [facility] Unknown [address] [facility] [address] 05/08/15 1057 - Present    75 - Unknown [facility] Unknown [address] [facility] [address] 01/15/15 1019 - Present    170 - Unknown POINT OF CARE TEST, GLUCOSE Unknown Unknown 10/31/11 1114 - Present    226 - Unknown [facility] Unknown [address] [facility] [address] 12/19/14 0955 - Present            Unresulted Labs     None         Imaging Results - 3 Days      XR Chest 2 Views [[id number]]  Resulted: 10/01/17 1313, Result status: Final result    Ordering provider: Keith Jefferson MD  10/01/17 0819 Resulted by: Prosper Vaughn MD    Performed: 10/01/17 0947 - 10/01/17 0955 Resulting lab: RADIOLOGY RESULTS    Narrative:       CHEST TWO VIEWS  10/1/2017 9:55 AM     COMPARISON: Frontal chest x-ray  9/28/2017    HISTORY: Persistent hypoxia and leukocytosis, evaluate for pneumonia.      Impression:       IMPRESSION: Tiny bilateral pleural effusions again noted. There are no  airspace opacities to suggest pneumonia. There is no pneumothorax.  Heart size is normal with no evidence for congestive failure.    TERE SANCHEZ MD      Testing Performed By     Lab - Abbreviation Name Director Address Valid Date Range    104 - Rad [facility] RADIOLOGY RESULTS Unknown Unknown 02/16/05 1553 - Present            Encounter-Level Documents:     There are no encounter-level documents.      Order-Level Documents:     There are no order-level documents.

## 2017-09-24 NOTE — IP AVS SNAPSHOT
` ` Patient Information     Patient Name Sex     Jon Toribio ([id number]) Male 1955       Room Bed    0526 0526-01      Patient Demographics     Address Phone    [address]  [facility] [address] [phone] (Home)  none (Work)  [phone] (Mobile)      Patient Ethnicity & Race     Ethnic Group Patient Race     White      Emergency Contact(s)     Name Relation Home Work Mobile    sarbjit ulloa Friend [phone] none [phone]    Sherice Toribio Son   [phone]    [facility] Catarino  Son   [phone]      Documents on File        Status Date Received Description       Documents for the Patient    Privacy Notice - [place]  06     Face Sheet  () 06     Insurance Card  06     External Medication Information Consent       Patient ID Received 14     Consent for Services - Hospital/Clinic Received () 14     Consent for Services - Hospital/Clinic Received () 11     Privacy Notice - [place] Received 11     Privacy Notice - [place] Received 12     Consent for Services - Hospital/Clinic Received () 12     Other Accepted 12 Bill of Rights    Consent for EHR Access  13 Copied from existing Consent for services - C/HOD collected on 2012    HIM EDUARDO Authorization - File Only  13 SARBJIT ULLOA    HIM EDUARDO Authorization - File Only  13 AUTHORIZATION TO RELEASE PHI-RELATED TO SUSPECTED MALTREATMENT    [facility] Specified Other       Insurance Card Received 14     Consent for Services/Privacy Notice - Hospital/Clinic Received 10/02/17        Documents for the Encounter    CMS IM for Patient Signature       CE Point of Care Auth Received        Admission Information     Attending Provider Admitting Provider Admission Type Admission Date/Time    Charlie Choi MD Foehrenbacher, Matthew Henry, MD Emergency 17  1713    Discharge Date Hospital Service Auth/Cert Status  Service Area     Hospitalist Incomplete [facility]    Unit Room/Bed Admission Status        5 MEDICAL SURGICAL 0526/0526-01 Admission (Confirmed)       Admission     Complaint    GI hemorrhage, GI bleed, melena and anemia      Hospital Account     Name Acct ID Class Status Primary Coverage    Jon Toribio [id number] Inpatient [facility]            Guarantor Account (for Hospital Account #[id number])     Name Relation to Pt Service Area Active? Acct Type    Jon Toribio  FCS Yes Personal/Family    Address Phone          [address] [place] [address] [phone](H)  none(O)              Coverage Information (for Hospital Account #[id number])     F/O Payor/Plan Precert #    [facility]     Subscriber Subscriber #    Jon Toribio [id number]    Address Phone    [address]  [place] [address] [phone]

## 2017-09-24 NOTE — IP AVS SNAPSHOT
` [place] [address] MEDICAL SURGICAL: [phone]                                              INTERAGENCY TRANSFER FORM - NURSING   2017                    Hospital Admission Date: 2017  ROWENA MCKEON   : 1955  Sex: Male        Attending Provider: Charlie Choi MD     Allergies:  No Known Allergies    Infection:  None   Service:  HOSPITALIST    Ht:  --   Wt:  63.3 kg (139 lb 9.6 oz)   Admission Wt:  63.6 kg (140 lb 3.4 oz)    BMI:  --   BSA:  --            Patient PCP Information     None on File      Current Code Status     Date Active Code Status Order ID Comments User Context       Prior      Code Status History     Date Active Date Inactive Code Status Order ID Comments User Context    10/4/2017 12:44 PM  DNR/DNI [id number]  Kym Chicas MD Outpatient    10/3/2017  9:33 AM 10/4/2017 12:44 PM DNR/DNI [id number] See record of discussion in progress note Abeba Gilbert, ZARINA CNS Inpatient    2017 10:03 PM 10/3/2017  9:33 AM Full Code [id number]  Charlie Choi MD Inpatient      Advance Directives        Does patient have a scanned Advance Directive/ACP document in EPIC?                   Hospital Problems as of 10/4/2017              Priority Class Noted POA    GI hemorrhage Medium  2017 Yes      Non-Hospital Problems as of 10/4/2017              Priority Class Noted    Hyperlipidemia Medium  Unknown      Immunizations     Name Date      TD (ADULT, 7+) 06          END      ASSESSMENT     Discharge Profile Flowsheet     EXPECTED DISCHARGE     COMMUNICATION ASSESSMENT      Expected Discharge Date  -- (TBD home hospice?) 10/02/17 1035   Patient's communication style  spoken language (English or Bilingual) 17 1713    DISCHARGE NEEDS ASSESSMENT     FINAL RESOURCES      Patient/family verbalizes understanding of discharge plan recommendations?  Yes 10/03/17 1444   Resources List  Hospice 10/03/17 1444    Medical Team  notified of plan?  yes 10/03/17 1444   Hospice  -- ([facility] [phone]) 10/03/17 1444    Readmission Within The Last 30 Days  no previous admission in last 30 days 10/03/17 1444   SKIN      Anticipated Changes Related to Illness  inability to care for self 10/03/17 1444   Inspection of bony prominences  Full 10/04/17 1050    Equipment Currently Used at Home  none 09/29/17 1659   Inspection under devices  Full 10/04/17 1050    Transportation Available  family or friend will provide 10/03/17 1444   Skin WDL  ex 10/04/17 1050    # of Referrals Placed by CTS  Transportation 10/04/17 1203   Skin Color/Characteristics  yellow 10/04/17 1050    ASSESSMENT OF FUNCTIONAL STATUS     Skin Temperature  warm 10/04/17 1050    Prior to admission patient needed assistance with:  Handling finances 10/03/17 1439   Skin Moisture  dry 10/04/17 1050    Assesssment of Functional Status  Not at baseline with ADL Functioning;Not at baseline with mobility;Not at  functional baseline 10/03/17 1439   Skin Elasticity  slow return to original state 10/04/17 1050    GASTROINTESTINAL (ADULT,PEDIATRIC,OB)     Skin Integrity  bruise(s);skin tear(s) 10/04/17 1050    GI WDL  ex 10/04/17 1047   Additional Documentation  Pressure Ulcer (LDA) 10/04/17 1050    Abdominal Appearance  distended;rounded 10/04/17 1047   SAFETY      All Quadrants Bowel Sounds  hypoactive 10/04/17 1047   Safety WDL  WDL 10/04/17 1100    Last Bowel Movement  10/02/17 10/04/17 1050   Safety Equipment  oxygen flowmeter 09/29/17 1830    GI Signs/Symptoms  abdominal discomfort;abdominal fullness;unable to pass flatus 10/04/17 1047   All Alarms  alarm(s) activated and audible 10/04/17 0348    Passing flatus  no 10/04/17 1050                      Assessment WDL (Within Defined Limits) Definitions           Safety WDL     Effective: 09/28/15    Row Information: <b>WDL Definition:</b> Bed in low position, wheels locked; call light in reach; upper side rails up x 2; ID  "band on<br> <font color=\"gray\"><i>Item=AS safety wdl>>List=AS safety wdl>>Version=F14</i></font>      Skin WDL     Effective: 09/28/15    Row Information: <b>WDL Definition:</b> Warm; dry; intact; elastic; without discoloration; pressure points without redness<br> <font color=\"gray\"><i>Item=AS skin wdl>>List=AS skin wdl>>Version=F14</i></font>      Vitals     Vital Signs Flowsheet     VITAL SIGNS     Compliance w/ventilator (intubated patients)  Extubated 09/28/17 2100    Temp  96.4  F (35.8  C) 10/03/17 0839   Vocalization (extubated patients)  0 09/28/17 2100    Temp src  Oral 10/03/17 0839   Muscle Tension  0 09/28/17 2100    Resp  28 10/03/17 1032   Total  0 09/28/17 2100    Pulse  95 09/30/17 0806   ANALGESIA SIDE EFFECTS MONITORING      Heart Rate  110 10/03/17 0839   Side Effects Monitoring: Respiratory Quality  R 10/03/17 1044    Pulse/Heart Rate Source  Monitor 10/03/17 0102   Side Effects Monitoring: Respiratory Depth  N 10/03/17 1044    BP  94/57 10/03/17 0839   Side Effects Monitoring: Sedation Level  1 10/03/17 1044    BP Location  Left arm 10/03/17 0839   HEIGHT AND WEIGHT      OXYGEN THERAPY     Weight  63.3 kg (139 lb 9.6 oz) 10/03/17 0538    SpO2  91 % 10/03/17 0839   Weight Method  Bed scale 10/02/17 0519    O2 Device  None (Room air) 10/03/17 0839   POSITIONING      FiO2 (%)  30 % 09/28/17 0735   Body Position  turned 10/04/17 0348    Oxygen Delivery  2 LPM 10/02/17 0107   Head of Bed (HOB)  HOB at 20 degrees (Simultaneous filing. User may not have seen previous data.) 10/03/17 2012    PAIN/COMFORT     Positioning/Transfer Devices  pillows;in use (Simultaneous filing. User may not have seen previous data.) 10/03/17 2012    Patient Currently in Pain  denies 10/03/17 2117   DAILY CARE      Preferred Pain Scale  number (Numeric Rating Pain Scale) 10/03/17 1032   Activity Management  bedrest 10/04/17 0348    Patient's Stated Pain Goal  No pain 10/03/17 0839   Activity Assistance Provided  assistance, " 2 people 10/04/17 0348    0-10 Pain Scale  7 10/04/17 1322   Assistive Device Utilized  mechanical lift 10/04/17 0348    Pain Location  Abdomen 10/03/17 1422   Additional Documentation  Activity Device Assistance (Row) 10/01/17 2120    Pain Orientation  Mid 10/02/17 0917   ECG      Evin Scale Score  5-->exhibits sluggish response to light glabellar tap or loud auditory stimulus 09/25/17 1346   ECG Rhythm  Sinus rhythm 09/29/17 0431    Pain Intervention(s)  Medication (See eMAR) 10/03/17 1422   Ectopy  None 09/27/17 1215    Response to Interventions  Relief 10/02/17 1624   Lead Monitored  Lead II 09/29/17 0431    CRITICAL-CARE PAIN OBSERVATION TOOL (CPOT)     POINT OF CARE TESTING      Facial Expression  0 09/28/17 2100   Puncture Site  fingertip 10/01/17 1235    Body Movements  0 09/28/17 2100   Bedside Glucose (mg/dl )   89 mg/dl 10/01/17 1235            Patient Lines/Drains/Airways Status    Active LINES/DRAINS/AIRWAYS     Name: Placement date: Placement time: Site: Days: Last dressing change:    Peripheral IV 09/29/17 Right 09/29/17   1003      5     Pressure Injury 10/04/17 Coccyx blanchable redness 10/04/17   1048    less than 1     Wound 09/26/17 Anterior;Inner Arm Skin tear 09/26/17   1200   Arm   8     Wound 09/26/17 Anterior;Lower Arm Skin tear 09/26/17   1200   Arm   8     Wound 10/04/17 Lower;Right Arm Skin tear 10/04/17   0600   Arm   less than 1     Wound 10/04/17 Mid Back Skin tear 10/04/17   1000   Back   less than 1             Patient Lines/Drains/Airways Status    Active PICC/CVC     None            Intake/Output Detail Report     Date Intake           Output     Net    Shift P.O. I.V. NG/GT IV Piggyback Plasma Blood Components Total Urine Emesis/NG output Stool Total       Day 10/03/17 0700 - 10/03/17 1459 -- -- -- -- -- -- -- -- -- -- -- 0    Elo 10/03/17 1500 - 10/03/17 2259 0 -- -- -- -- -- -- -- -- -- -- 0    Noc 10/03/17 2300 - 10/04/17 0659 -- -- -- -- -- -- -- 50 -- -- 50 -50    Day  10/04/17 0700 - 10/04/17 1459 -- -- -- -- -- -- -- -- -- -- -- 0    Elo 10/04/17 1500 - 10/04/17 2259 -- -- -- -- -- -- -- -- -- -- -- 0      Last Void/BM       Most Recent Value    Urine Occurrence 1 at 10/04/2017 0300    Stool Occurrence 0 at 10/03/2017 2000      Case Management/Discharge Planning     Case Management/Discharge Planning Flowsheet     REFERRAL INFORMATION     Who is your support system?  Children;Sibling(s) 10/03/17 1439    Did the Initial Social Work Assessment result in a Social Work Case?  Yes 10/03/17 1417   Description of Support System  Supportive;Involved 10/03/17 1439    Admission Type  inpatient 10/03/17 1417   Support Assessment  Adequate family and caregiver support;Adequate social supports 10/03/17 1439    Arrived From  home or self-care 10/03/17 1417   Quality of Family Relationships  supportive;involved;evident 10/03/17 1439    Referral Source  physician 10/03/17 1417   ASSESSMENT OF FUNCTIONAL STATUS      # of Referrals Placed by CTS  Transportation 10/04/17 1203   Prior to admission patient needed assistance with:  Handling finances 10/03/17 1439    Reason For Consult  discharge planning 10/03/17 1417   Assesssment of Functional Status  Not at baseline with ADL Functioning;Not at baseline with mobility;Not at  functional baseline 10/03/17 1439    Record Reviewed  history and physical;medical record 10/03/17 1417   EXPECTED DISCHARGE      CTS Assigned to Case  Danielle  10/04/17 1203   Expected Discharge Date  -- (TBD home hospice?) 10/02/17 1035    LIVING ENVIRONMENT     DISCHARGE PLANNING      Lives With  child(renata), adult 09/29/17 1659   Patient/family verbalizes understanding of discharge plan recommendations?  Yes 10/03/17 1444    Living Arrangements  house 09/29/17 1659   Medical Team notified of plan?  yes 10/03/17 1444    Provides Primary Care For  no one 10/03/17 1439   Readmission Within The Last 30 Days  no previous admission in last 30 days 10/03/17 1444    Quality Of  Family Relationships  supportive 10/03/17 1439   Anticipated Changes Related to Illness  inability to care for self 10/03/17 1444    Able to Return to Prior Living Arrangements  yes 10/03/17 1439   Transportation Available  family or friend will provide 10/03/17 1444    HOME SAFETY     FINAL RESOURCES      Patient Feels Safe Living in Home?  yes 10/03/17 1439   Equipment Currently Used at Home  none 09/29/17 1659    ASSESSMENT OF FAMILY/SOCIAL SUPPORT     Resources List  Hospice 10/03/17 1444    Marital Status   10/03/17 1439   Hospice  -- ([facility] [phone]) 10/03/17 1447

## 2017-09-24 NOTE — IP AVS SNAPSHOT
` [place] [id number] [facility]: [phone]            Medication Administration Report for Jon Toribio as of 10/04/17 1632   Legend:    Given Hold Not Given Due Canceled Entry Other Actions    Time Time (Time) Time  Time-Action       Inactive    Active    Linked        Medications 09/28/17 09/29/17 09/30/17 10/01/17 10/02/17 10/03/17 10/04/17    acetaminophen (TYLENOL) Suppository 650 mg  Dose: 650 mg Freq: EVERY 6 HOURS PRN Route: RE  PRN Reasons: mild pain,fever  PRN Comment: temperature over 100  F   Start: 10/03/17 0940   Admin Instructions: Maximum acetaminophen dose from all sources = 75 mg/kg/day not to exceed 4 grams/day.               acetaminophen (TYLENOL) tablet 650 mg  Dose: 650 mg Freq: EVERY 6 HOURS PRN Route: PO  PRN Reasons: mild pain,fever  PRN Comment: temperature over 100  F   Start: 10/03/17 0940   Admin Instructions: Maximum acetaminophen dose from all sources = 75 mg/kg/day not to exceed 4 grams/day.               atropine 1 % ophthalmic solution 1-2 drop  Dose: 1-2 drop Freq: EVERY 1 HOUR PRN Route: SL  PRN Reason: other  PRN Comment: secretions  Start: 10/03/17 0941              cefTRIAXone (ROCEPHIN) 1 g vial to attach to  mL bag for ADULTS or NS 50 mL bag for PEDS  Dose: 1 g Freq: EVERY 24 HOURS Route: IV  Indications Comment: empiric for SBP  Last Dose: 1 g (10/04/17 1219)  Start: 10/01/17 1130       1151 (1 g)-New Bag        1148 (1 g)-New Bag        1129 (1 g)-New Bag        1219 (1 g)-New Bag           haloperidol lactate (HALDOL) injection 1-2 mg  Dose: 1-2 mg Freq: EVERY 1 HOUR PRN Route: IV  PRN Reason: agitation  Start: 10/03/17 0940   Admin Instructions: Call provider if agitation not relieved at these doses.  Contraindicated in Parkinsonism.               HYDROmorphone (DILAUDID) injection 0.2 mg  Dose: 0.2 mg Freq: EVERY 4 HOURS PRN Route: IV  PRN Reason: severe pain  Start: 10/02/17 1200   Admin Instructions: Hold while on PCA.          1031 (0.2  "mg)-Given            HYDROmorphone (STANDARD CONC) (DILAUDID) liquid 1-2 mg  Dose: 1-2 mg Freq: EVERY 2 HOURS PRN Route: PO  PRN Reason: moderate to severe pain  PRN Comment: or dyspnea  Start: 10/03/17 0940         1420 (1 mg)-Given        1322 (2 mg)-Given          Or  HYDROmorphone (DILAUDID) half-tab 1-2 mg  Dose: 1-2 mg Freq: EVERY 2 HOURS PRN Route: PO  PRN Reason: moderate to severe pain  PRN Comment: or dyspnea  Start: 10/03/17 0940                            hypromellose-dextran (ARTIFICAL TEARS) ophthalmic solution 2-3 drop  Dose: 2-3 drop Freq: EVERY 1 HOUR PRN Route: OP  PRN Reason: dry eyes  Start: 09/24/17 2332   Admin Instructions: To affected eye(s)               lidocaine (LMX4) kit  Freq: EVERY 1 HOUR PRN Route: Top  PRN Reason: mild pain  PRN Comment: with VAD insertion or accessing implanted port,  Start: 09/25/17 1531   Admin Instructions: Do NOT give if patient has a history of allergy to any local anesthetic or any \"hector\" product.   Apply 30 min prior to VAD insertion or port access.  MAX Dose:  2.5 gm (  of 5 gm tube)               lidocaine 1 % 1 mL  Dose: 1 mL Freq: EVERY 1 HOUR PRN Route: OTHER  PRN Comment: mild pain with VAD insertion or accessing implanted port.  Start: 09/25/17 1531   Admin Instructions: Do NOT give if patient has a history of allergy to any local anesthetic or any \"hector\" product. MAX dose 1 mL subcutaneous OR intradermal in divided doses.               LORazepam (ATIVAN) injection 0.25-0.5 mg  Dose: 0.25-0.5 mg Freq: EVERY 6 HOURS PRN Route: IV  PRN Reason: anxiety  Start: 10/03/17 1115   Admin Instructions: Avoid if delirium present.  Use fourth line for nausea.  For IV PUSH: Dilute with equal volume of NS.              Or  LORazepam (ATIVAN) half-tab 0.25-0.5 mg  Dose: 0.25-0.5 mg Freq: EVERY 6 HOURS PRN Route: PO  PRN Reason: anxiety  Start: 10/03/17 1115   Admin Instructions: Avoid if delirium present.  Use fourth line for nausea.              Or  LORazepam " (ATIVAN) half-tab 0.25-0.5 mg  Dose: 0.25-0.5 mg Freq: EVERY 6 HOURS PRN Route: SL  PRN Reason: anxiety  Start: 10/03/17 1115   Admin Instructions: Avoid if delirium present.  Use fourth line for nausea.               May continue current IV fluids if patient has IV fluids infusing.  Freq: CONTINUOUS PRN Route: XX  Start: 09/25/17 1531              May continue current IV fluids if patient has IV fluids infusing.  Freq: CONTINUOUS PRN Route: XX  Start: 09/25/17 1531              May take regular AM medications except those listed below  Freq: CONTINUOUS PRN Route: XX  Start: 09/25/17 1531   Admin Instructions: May take regular AM medications except those listed below               naloxone (NARCAN) injection 0.1-0.4 mg  Dose: 0.1-0.4 mg Freq: EVERY 2 MIN PRN Route: IV  PRN Reason: opioid reversal  Start: 09/24/17 2202   Admin Instructions: For respiratory rate LESS than or EQUAL to 8.  Partial reversal dose:  0.1 mg titrated q 2 minutes for Analgesia Side Effects Monitoring Sedation Level of 3 (frequently drowsy, arousable, drifts to sleep during conversation).Full reversal dose:  0.4 mg bolus for Analgesia Side Effects Monitoring Sedation Level of 4 (somnolent, minimal or no response to stimulation).               nicotine (NICOTROL) Inhaler 1 Cartridge  Dose: 1 Cartridge Freq: EVERY 1 HOUR PRN Route: IN  PRN Reason: smoking cessation  Start: 09/29/17 1933   Admin Instructions: Each cartridge delivers 4 mg.               ondansetron (ZOFRAN-ODT) ODT tab 4 mg  Dose: 4 mg Freq: EVERY 6 HOURS PRN Route: PO  PRN Reasons: nausea,vomiting  Start: 09/24/17 2202   Admin Instructions: This is Step 1 of nausea and vomiting management.  If nausea not resolved in 15 minutes, go to Step 2 prochlorperazine (COMPAZINE). Do not push through foil backing. Peel back foil and gently remove. Place on tongue immediately. Administration with liquid unnecessary          1420 (4 mg)-Given           Or  ondansetron (ZOFRAN) injection 4  mg  Dose: 4 mg Freq: EVERY 6 HOURS PRN Route: IV  PRN Reasons: nausea,vomiting  Start: 09/24/17 2202   Admin Instructions: This is Step 1 of nausea and vomiting management.  If nausea not resolved in 15 minutes, go to Step 2 prochlorperazine (COMPAZINE).  Irritant.                      pantoprazole (PROTONIX) EC tablet 40 mg  Dose: 40 mg Freq: 2 TIMES DAILY BEFORE MEALS Route: PO  Start: 09/29/17 1630   Admin Instructions: DO NOT CRUSH.      1718 (40 mg)-Given        0726 (40 mg)-Given       1552 (40 mg)-Given        0752 (40 mg)-Given       1635 (40 mg)-Given        0619 (40 mg)-Given              1619 (40 mg)-Given        0618 (40 mg)-Given       (2005)-Not Given        0659 (40 mg)-Given       [ ] 1630           senna-docusate (SENOKOT-S;PERICOLACE) 8.6-50 MG per tablet 1 tablet  Dose: 1 tablet Freq: 2 TIMES DAILY Route: PO  Start: 10/03/17 0945                (2116)-Not Given        (0813)-Not Given       [ ] 2100           sodium chloride (PF) 0.9% PF flush 3 mL  Dose: 3 mL Freq: EVERY 1 MIN PRN Route: IV  PRN Reason: line flush  PRN Comment: after medication administration. For peripheral IV flush post IV meds  Start: 09/25/17 1531              sodium chloride (PF) 0.9% PF flush 3 mL  Dose: 3 mL Freq: EVERY 8 HOURS Route: IV  Start: 09/25/17 1545   Admin Instructions: And Q1H PRN, to lock peripheral IV dormant line.       0024 (3 mL)-Given       0857 (3 mL)-Given       1750 (3 mL)-Given       2320 (3 mL)-Given        0838 (3 mL)-Given       (1628)-Not Given        (0100)-Not Given       (0936)-Not Given       (2133)-Not Given        (0143)-Not Given       (0755)-Not Given       (1551)-Not Given        (0013)-Not Given              (1733)-Not Given       (2352)-Not Given        0838 (3 mL)-Given       [ ] 1600        0018 (3 mL)-Given       0811 (3 mL)-Given       [ ] 1600           sodium chloride (PF) 0.9% PF flush 3 mL  Dose: 3 mL Freq: EVERY 1 HOUR PRN Route: IV  PRN Reasons: line flush,post meds or blood  draw  Start: 09/25/17 1531   Admin Instructions: for peripheral IV flush post IV meds              Future Medications  Medications 09/28/17 09/29/17 09/30/17 10/01/17 10/02/17 10/03/17 10/04/17       bisacodyl (DULCOLAX) Suppository 10 mg  Dose: 10 mg Freq: ONCE PRN Route: RE  PRN Reason: other  PRN Comment: for no bowel movement for 72 hours  Start: 10/06/17 0000   Admin Instructions: Give only after rectal check for impacted stool.              Discontinued Medications  Medications 09/28/17 09/29/17 09/30/17 10/01/17 10/02/17 10/03/17 10/04/17         Rate: 10 mL/hr Freq: CONTINUOUS Route: IV  Start: 09/25/17 0845   End: 10/03/17 0938    0000 ( )-Rate/Dose Verify       0200 ( )-Rate/Dose Verify       0400 ( )-Rate/Dose Verify       0509 ( )-New Bag       0600 ( )-Rate/Dose Verify       1200-Stopped         1554 ( )-New Bag          0938-Med Discontinued          Dose: 15-30 g Freq: EVERY 15 MIN PRN Route: PO  PRN Reason: low blood sugar  Start: 09/24/17 2243   End: 10/03/17 0938   Admin Instructions: Give 15 g for BG 51 to 69 mg/dL IF patient is conscious and able to swallow. Give 30 g for BG less than or equal to 50 mg/dL IF patient is conscious and able to swallow. Do NOT give glucose gel via enteral tube.  IF patient has enteral tube: give apple juice 120 mL (4 oz or 15 g of CHO) via enteral tube for BG 51 to 69 mg/dL.  Give apple juice 240 mL (8 oz or 30 g of CHO) via enteral tube for BG less than or equal to 50 mg/dL.    ~Oral gel is preferable for conscious and able to swallow patient.   ~IF gel unavailable or patient refuses may provide apple juice 120 mL (4 oz or 15 g of CHO). Document juice on I and O flowsheet.          0938-Med Discontinued       Or    Dose: 25-50 mL Freq: EVERY 15 MIN PRN Route: IV  PRN Reason: low blood sugar  Start: 09/24/17 2243   End: 10/03/17 0938   Admin Instructions: Use if have IV access, BG less than 70 mg/dL and meet dose criteria below:  Dose if conscious and alert (or  disorientated) and NPO = 25 mL  Dose if unconscious / not alert = 50 mL  Vesicant.          0938-Med Discontinued       Or    Dose: 1 mg Freq: EVERY 15 MIN PRN Route: SC  PRN Reason: low blood sugar  PRN Comment: May repeat x 1 only  Start: 09/24/17 2243   End: 10/03/17 0938   Admin Instructions: May give SQ or IM. ONLY use glucagon IF patient has NO IV access AND is UNABLE to swallow AND blood glucose is LESS than or EQUAL to 50 mg/dL.          0938-Med Discontinued          Dose: 0.3-0.5 mg Freq: EVERY 2 HOURS PRN Route: IV  PRN Reason: severe pain  Start: 09/24/17 2202   End: 10/02/17 1158   Admin Instructions: Hold while on PCA.        1635 (0.3 mg)-Given        0958 (0.5 mg)-Given       1158-Med Discontinued           Dose: 0.25-0.5 mg Freq: EVERY 6 HOURS Route: IV  Start: 10/03/17 0945   End: 10/03/17 1103   Admin Instructions: Avoid if delirium present.  Use fourth line for nausea.  For IV PUSH: Dilute with equal volume of NS.          1103-Med Discontinued             Or    Dose: 0.25-0.5 mg Freq: EVERY 6 HOURS Route: PO  Start: 10/03/17 0945   End: 10/03/17 1103   Admin Instructions: Avoid if delirium present.  Use fourth line for nausea.          1103-Med Discontinued             Or    Dose: 0.25-0.5 mg Freq: EVERY 6 HOURS Route: SL  Start: 10/03/17 0945   End: 10/03/17 1103   Admin Instructions: Avoid if delirium present.  Use fourth line for nausea.          1103-Med Discontinued                Dose: 4 g Freq: EVERY 4 HOURS PRN Route: IV  PRN Reason: magnesium supplementation  Start: 09/26/17 0725   End: 10/03/17 0938   Admin Instructions: For serum Mg++ less than 1.6 mg/dL  Give 4 g and recheck magnesium level 2 hours after dose, and next AM.          0938-Med Discontinued          Dose: 4 g Freq: EVERY 4 HOURS PRN Route: IV  PRN Reason: magnesium supplementation  Start: 09/25/17 1646   End: 10/02/17 1147   Admin Instructions: For serum Mg++ less than 1.6 mg/dL  Give 4 g and recheck magnesium level 2  hours after dose, and next AM.         1147-Med Discontinued           Dose: 1 tablet Freq: DAILY Route: PO  Start: 10/03/17 0900   End: 10/03/17 0938         0838 (1 tablet)-Given       0938-Med Discontinued          Dose: 20-40 mEq Freq: EVERY 2 HOURS PRN Route: ORAL OR FEED  PRN Reason: potassium supplementation  Start: 09/25/17 1646   End: 10/03/17 0938   Admin Instructions: Use if unable to tolerate tablets.  If Serum K+ 3.0-3.3, dose = 60 mEq po total dose (40 mEq x1 followed in 2 hours by 20 mEq x1). Recheck K+ level 4 hours after dose and the next AM.  If Serum K+ 2.5-2.9, dose = 80 mEq po total dose (40 mEq Q2H x2). Recheck K+ level 4 hours after dose and the next AM.  If Serum K+ less than 2.5, See IV order.  Dissolve packet contents in 4-8 ounces of cold water or juice.          0938-Med Discontinued          Dose: 10 mEq Freq: EVERY 1 HOUR PRN Route: IV  PRN Reason: potassium supplementation  Start: 09/25/17 1646   End: 10/03/17 0938   Admin Instructions: Infuse via PERIPHERAL LINE or CENTRAL LINE. Use for central line replacement if patient weight less than 65 kg, if patient is on TPN with high potassium content or if unit does not stock 20 mEq bags.   If Serum K+ 3.0-3.3, dose = 10 mEq/hr x4 doses (40 mEq IV total dose). Recheck K+ level 2 hours after dose and the next AM.   If Serum K+ less than 3.0, dose = 10 mEq/hr x6 doses (60 mEq IV total dose). Recheck K+ level 2 hours after dose and the next AM.          0938-Med Discontinued          Dose: 10 mEq Freq: EVERY 1 HOUR PRN Route: IV  PRN Reason: potassium supplementation  Start: 09/25/17 1646   End: 10/03/17 0938   Admin Instructions: Infuse via PERIPHERAL LINE. Use potassium with lidocaine for pain with peripheral administration.  If Serum K+ 3.0-3.3, dose = 10 mEq/hr x4 doses (40 mEq IV total dose). Recheck K+ level 2 hours after dose and the next AM.  If Serum K+ less than 3.0, dose = 10 mEq/hr x6 doses (60 mEq IV total dose). Recheck K+ level 2  hours after dose and the next AM.          0938-Med Discontinued          Dose: 20 mEq Freq: EVERY 1 HOUR PRN Route: IV  PRN Reason: potassium supplementation  Start: 09/25/17 1646   End: 10/03/17 0938   Admin Instructions: Infuse via CENTRAL LINE Only. May need EKG if less than 65 kg or on TPN - Max rate is 0.3 mEq/kg/hr for patients not on EKG monitoring.   If Serum K+ 3.0-3.3, dose = 20 mEq/hr x2 doses (40 mEq IV total dose). Recheck K+ level 2 hours after dose and the next AM.  If Serum K+ less than 3.0, dose = 20 mEq/hr x3 doses (60 mEq IV total dose). Recheck K+ level 2 hours after dose and the next AM.          0938-Med Discontinued          Dose: 20-40 mEq Freq: EVERY 2 HOURS PRN Route: PO  PRN Reason: potassium supplementation  Start: 09/25/17 1646   End: 10/03/17 0938   Admin Instructions: Use if able to take PO.   If Serum K+ 3.0-3.3, dose = 60 mEq po total dose (40 mEq x1 followed in 2 hours by 20 mEq x1). Recheck K+ level 4 hours after dose and the next AM.  If Serum K+ 2.5-2.9, dose = 80 mEq po total dose (40 mEq Q2H x2). Recheck K+ level 4 hours after dose and the next AM.  If Serum K+ less than 2.5, See IV order.  DO NOT CRUSH          0938-Med Discontinued          Dose: 15 mmol Freq: DAILY PRN Route: IV  PRN Reason: phosphorous supplementation  Start: 09/26/17 0725   End: 10/03/17 0938   Admin Instructions: For serum phosphorus level 2-2.4  Do not infuse Phosphorus in the same line as TPN.   Give 15 mmol and recheck phosphorus level next AM.      1012 (15 mmol)-New Bag           0938-Med Discontinued          Dose: 20 mmol Freq: EVERY 6 HOURS PRN Route: IV  PRN Reason: phosphorous supplementation  Start: 09/26/17 0725   End: 10/03/17 0938   Admin Instructions: For serum phosphorus level 1.1-1.9  For CENTRAL Line ONLY  Do not infuse Phosphorus in the same line as TPN.   Give 20 mmol and recheck phosphorus level 2 hours after last dose and next AM.     1338 (20 mmol)-New Bag            0938-Med  Discontinued          Dose: 20 mmol Freq: EVERY 6 HOURS PRN Route: IV  PRN Reason: phosphorous supplementation  Start: 09/26/17 0725   End: 10/03/17 0938   Admin Instructions: For serum phosphorus level 1.1-1.9  For Peripheral Line  Do not infuse Phosphorus in the same line as TPN.   Give 20 mmol and recheck phosphorus level 2 hours after last dose and next AM.          0938-Med Discontinued          Dose: 25 mmol Freq: EVERY 8 HOURS PRN Route: IV  PRN Reason: phosphorous supplementation  Start: 09/26/17 0725   End: 10/03/17 0938   Admin Instructions: For serum phosphorus level less than 1.1  Do not infuse Phosphorus in the same line as TPN.   Give 25 mmol and recheck phosphorus level 2 hours after last dose and next AM.          0938-Med Discontinued     Medications 09/28/17 09/29/17 09/30/17 10/01/17 10/02/17 10/03/17 10/04/17

## 2017-09-24 NOTE — PROGRESS NOTES
Patient arrived by ambulance and was intubated in ED with 7.5 ETT @ 24. Current settings cmv 14/400/50%/+5. RT will continue to monitor.

## 2017-09-24 NOTE — IP AVS SNAPSHOT
` `     [place] [address] MEDICAL SURGICAL: [phone]                 INTERAGENCY TRANSFER FORM - NOTES (H&P, Discharge Summary, Consults, Procedures, Therapies)   2017                    Hospital Admission Date: 2017  ROWENA TORIBIO   : 1955  Sex: Male        Patient PCP Information     None on File         History & Physicals      H&P by Charlie hCoi MD at 2017  7:55 PM     Author:  Charlie Choi MD Service:  Hospitalist Author Type:  Physician    Filed:  2017 10:49 PM Date of Service:  2017  7:55 PM Creation Time:  2017  7:55 PM    Status:  Addendum :  Charlie Choi MD (Physician)         [facility]  Hospitalist Admission Note  Name: Rowena Toribio    MRN: [id number]  YOB: 1955    Age: 62 year old  Date of admission: 2017  Primary care provider: No primary care provider on file.    Chief Complaint:  shock    Rowena Toribio is a 62 year old male with PMH including substance abuse, depression, alcohol withdrawal who presents with severe hypoglycemia and shock state.  He was found unresponsive by family today[MF1.1] after binging on vodka (more than usual) the past five days or so.  There is no hx of known abdominal pain or other recent symptoms other than heavy alcohol use and lack of food intake[MF1.2].  EMS was called, blood sugar was 39 in the field.  He was breathing but not responsive found to be in a shock state with profound lactic acidosis and suspected acute upper GI bleeding as he had brinda melena in the ER[MF1.1] and coffee ground material suctioned out of his stomach.  He now is on pressors with central line, arterial line, IV protonix/octreotide/ceftriaxone and has had his INR reversed.[MF1.2]  He is s/p transfusion 2 U RBCs.  He has PENNY which is slowly improving.[MF1.3]      Assessment and Plan:   1. Shock state with acute GI bleeding: As above.  Recent history of  heavy alcohol abuse and melena in the ER with hgb ~8 grams below his known baseline suggests acute blood loss is playing a large role here.  There is no known hx of co-ingestion otherwise.  He'll be admitted to the ICU for ongoing cares as noted below.  His lactic acid is slowly improving (from 16 to 12.8) and we'll monitor this closely.[MF1.1]  He has some free fluid in his abdomen which I suspect is ascites though non-contrast CT scan of his abdomen suggests some bowel thickening.  In the absence of any recent abdominal pain reported to family gut ischemia seems to be an unlikely cause of his shock but warrants evaluation by colorectal surgery.[MF1.2]      --has radial arterial line and central line  --s/p 2 U RBCs, 2 U FFP and 3L IVF boluses.  --continue D5 0.45 NS at 125 cc/hr, will need to change to NS if sodium trends down at all  --Norepinephrine drip  --admit to the ICU  --serial bmp, hgb, lactic acid[MF1.1]  --follow up on blood cultures.[MF1.3]  --Dr. Garcia GI was contacted, aware of the patient.[MF1.1]  Formal consult ordered.[MF1.3]    --continue protonix drip, octreotide drip, empiric ceftriaxone given hx of etoh abuse (though no clear hx of cirrhosis)    2.   Hx of alcohol abuse and withdrawal: His son reported he has been drinking vodka a lot the past five days.  His alcohol level is currently negative but he is at very high risk for withdrawal.  We'll have CIWA protocol ordered as well as IV vitamins.    3.   Severe lactic acidosis: 16.0 with gradual improvement to 12.8.  Most likely this is hypoperfusion from AMS and GI bleeding but await blood cultures results and monitor this closely.  GIven etoh abuse he may have impaired clearance of lactate.  His blood sugar has normalized and I would consider metformin ingestion given how high his lactic acid is though given improvement with fluids and blood products this seems most likely related to hemodynamic compromise/hypoperfusion.    4.    Hypoglycemia:[MF1.1] to 39 in the field.[MF1.3]  Currently resolved.  May be due to impaired gluconeogenesis from etoh abuse.  No clear hx of other ingestion but if hypoglycemia persists would need to consider other etiologies.  I will check a random cortisol to evaluate for adrenal insufficiency.    5.   Encephalopathy: He was intubated for airway protection.  CT head was negative.  Likely in part due to shock state and also hypoglycemia.  Continue to treat as above and await clearing.[MF1.1]  His pupils are responsive and he is coughing on his ET tube when off sedation.  Could have an anoxic brain injury as well given hypotension/time down.[MF1.3]    6.  Coagulopathy:[MF1.1] INR is 2.64.  Unclear if this is fixed from liver disease or nutritional.[MF1.3]  he is s/p 2 U FFP and 10 mg IV vitamin K.  We'll trend his INR.    7.  Hyponatremia: monitor.  Has risen fairly slowly from 120 on admission to 123 now.  Trend, if able try to avoid overly rapid correction.  --he is s/p 3 L isotonic fluid in the ER (NS), will provide 0.45 NS as maintenance for now to avoid over correction.    8.  Mention of[MF1.1] possible[MF1.3] diffuse bowel wall thickening on non-contrast abd CT: consider[MF1.1] ischemic insult to his gut[MF1.3] as a cause of bleeding though this seems less likely[MF1.1] given recent very heavy drinking and[MF1.3] as blood was aspirated from his stomach[MF1.1] suggestive of an upper GI/stomach source primarily.[MF1.3]  Will treat supportively, recheck WBC and lactate.  Given elevated WBC, lactate and shock state I will have colorectal surgery review his case as well in consultation.  Renal failure right now precludes CT w/ contrast but it is possible that by morning this may be obtained if he has further improvement[MF1.1] in his renal function.[MF1.3]    9.  PENNY: suspect pre-renal.  Will check a CK and urine for completeness.  Improving with IVF/blood products.[MF1.2]        DVT Prophylaxis: Pneumatic  Compression Devices  Code Status: Full Code  Discharge Dispo: admit to the ICU      History of Present Illness:  Jon Toribio is a 62 year old male with PMH including substance abuse, depression, alcohol withdrawal who presents with severe hypoglycemia and shock state.  He was found unresponsive by family today.  EMS was called, blood sugar was 39 in the field.  He was breathing but not responsive.    Here in the ER he was hypotensive to the 70s and tachycardic to the 120s.  Lab workup revealed numerous abnormalities including hyponatremia to 120, K of 5.5, bicarb of 16, PENNY with cr of 2.63 and BUN of 44 with anion gap of 23.  Lactate was 16.0.  LFTs were abnormal.  Hgb ws noted to be 6.0 (baseline in 2007 was 14.5).  WBC was 15.6.  INR was elevated to 2.64 (not on coumadin).  Ethanol level was noted to be negative as was tylenol and ASA level.    He was managed with 3L NS boluses, IV calcium gluconate, IV vitamin K 10 mg, transfusion of 2 U RBCs, and started on IV protonix.  He was intubated for airway protection with central line placement and started on a levophed drip.  He was also given empiric ceftriaxone and octreotide.       Past Medical History:  Past Medical History:   Diagnosis Date     Other and unspecified hyperlipidemia      Substance abuse      Past Surgical History:  Past Surgical History:   Procedure Laterality Date     C NONSPECIFIC PROCEDURE      Vasectomy     Social History:  Social History   Substance Use Topics     Smoking status: Current Every Day Smoker     Packs/day: 1.00     Types: Cigarettes     Smokeless tobacco: Not on file      Comment: 1/2 pack daily     Alcohol use No      Comment: binge drinking for past 2 weeks     Social History     Social History Narrative     Family History:  Family History   Problem Relation Age of Onset     HEART DISEASE Father      Neurologic Disorder Mother      dementia     Dementia Mother      CANCER Brother      throat     Allergies:  No Known  Allergies  Medications:[MF1.1]  None[MF1.4]         Review of Systems:  A Comprehensive greater than 10 system review of systems was carried out.  Pertinent positives and negatives are noted above.  Otherwise negative for contributory information.     Physical Exam:[MF1.1]  Blood pressure 131/76, pulse 118, temperature 95.9  F (35.5  C), resp. rate 27, weight 63.6 kg (140 lb 3.4 oz), SpO2 100 %.[MF1.5]  Wt Readings from Last 1 Encounters:   09/24/17 63.6 kg (140 lb 3.4 oz)     Exam:  General:[MF1.1] intubated, sedated. Thin man looks cachectic and older than his stated age.[MF1.2]  HEENT:[MF1.1] ET tube in place[MF1.2], face symmetric.   Cardiac:[MF1.1] tachycardic[MF1.2] RR, S1, S2.  No murmurs appreciated.  Pulmonary: Normal chest rise,[MF1.1] ventilated via mechanical vent.[MF1.2]    Abdomen: soft, non-distended.  Bowel Sounds Presen[MF1.1]t[MF1.2]  Extremities:[MF1.1] thin, scattered bruises.[MF1.2]  no deformities.  Warm, well perfused.  Skin:  Warm and Dry.  Neuro:[MF1.1] sedated.[MF1.2]    Data:  EKG:[MF1.1]  Sinus tachycardia.[MF1.2]  Imaging:  Recent Results (from the past 48 hour(s))   XR Chest Port 1 View    Narrative    CHEST PORTABLE ONE VIEW   9/24/2017 6:01 PM     HISTORY: Unresponsive.    COMPARISON: None.      Impression    IMPRESSION: ET tube and nasogastric tube in good position. Heart size  and pulmonary vascular normal. Lungs are clear.    CHERRY MADDOX MD   CT Head w/o Contrast    Narrative    CT SCAN OF THE HEAD WITHOUT CONTRAST   9/24/2017 6:44 PM     HISTORY: Unresponsive.    TECHNIQUE:  Axial images of the head and coronal reformations without  IV contrast material.  Radiation dose for this scan was reduced using  automated exposure control, adjustment of the mA and/or kV according  to patient size, or iterative reconstruction technique.    COMPARISON: None.    FINDINGS: There is some mild cerebral atrophy. Minimal patchy white  matter changes are present in both hemispheres without mass  effect.  There is no evidence for intracranial hemorrhage, mass effect, acute  infarct, or skull fracture. Visualized paranasal sinuses and mastoid  air cells are clear. Vascular calcifications noted.      Impression    IMPRESSION: Chronic changes. No evidence for intracranial hemorrhage  or any acute process.    CHERRY MADDOX MD   Cervical spine CT w/o contrast    Narrative    CT CERVICAL SPINE WITHOUT CONTRAST   9/24/2017 6:44 PM     HISTORY: Unresponsive.     TECHNIQUE: Axial images of the cervical spine were obtained without  intravenous contrast. Multiplanar reformations were performed.  Radiation dose for this scan was reduced using automated exposure  control, adjustment of the mA and/or kV according to patient size, or  iterative reconstruction technique.     COMPARISON: None.    FINDINGS: Sagittal reconstructions demonstrate minimal degenerative  spondylolisthesis of C4 on C5 measuring approximately 2 to 3 mm. Disc  space narrowing is present at C5-C6. There is no evidence for any  acute fracture.    C1-C2: Degenerative changes are present. There is no stenosis.    C2-C3: Left-sided uncinate spurring and minimal facet hypertrophy is  present. There is no stenosis.    C3-C4: Moderate left-sided facet hypertrophy and posterior osteophyte  formation is present. There is also some left-sided uncinate spurring.  There is moderate left-sided foraminal stenosis and mild central canal  stenosis.    C4-C5: Uncinate spurring and facet hypertrophy is present on the left  greater than right causing some moderate left-sided foraminal  stenosis. There is also mild central canal stenosis due to broad-based  disc bulging and some minimal spondylolisthesis.    C5-6: Broad-based posterior osteophyte formation and facet hypertrophy  is present causing moderate central canal stenosis and moderate to  severe bilateral neural foraminal stenosis.    C6-7: Posterior osteophyte formation, disc bulge and facet hypertrophy  is present  causing mild to moderate bilateral neural foraminal  stenosis and mild central canal stenosis.    C7-T1: Normal.      Impression    IMPRESSION:  1. Grade 1 degenerative spondylolisthesis at C4-C5.  2. Multilevel degenerative disc and facet disease most advanced at  C5-C6 where there is moderate central canal stenosis and moderate to  severe bilateral neural foraminal stenosis.  3. No evidence for fracture.    CHERRY MADDOX MD   CT Chest Abdomen Pelvis w/o Contrast    Narrative    CT CHEST, ABDOMEN, PELVIS WITHOUT CONTRAST 9/24/2017 6:45 PM     HISTORY: Unresponsive.    Radiation dose for this scan was reduced using automated exposure  control, adjustment of the mA and/or kV according to patient size, or  iterative reconstruction technique.    FINDINGS:   Chest: Endotracheal tube tip is approximately 4 cm from the ailyn  midway between the clavicles and ailyn. Nasogastric tube extends into  the stomach. The lungs are grossly clear with only mild posterior  dependent atelectasis at the lung bases. No pleural effusion or  pneumothorax is demonstrated. The mediastinum and josselyn are grossly  unremarkable for the unenhanced technique.    Abdomen/pelvis: There is marked hepatic fatty infiltration. Contents  of the gallbladder are relatively radiodense measuring approximately  30 HU. Mild to moderate peritoneal fluid is noted within the pelvis,  paracolic gutters, and adjacent to the spleen. No renal stones are  demonstrated. There is no hydronephrosis. The bowel is not well  evaluated without contrast enhancement. Diffuse small bowel wall  thickening and colonic wall thickening may be present. Sigmoid  diverticulosis is also noted. A Street catheter is present within the  urinary bladder which is relatively collapsed.      Impression    IMPRESSION:  1. Nonspecific peritoneal fluid within the abdomen and pelvis.  2. Evaluation of the bowel is suboptimal due to lack of contrast  enhancement. Diffuse bowel wall thickening may  be present. There is no  definitive evidence of bowel obstruction. No free peritoneal air.  3. Severe hepatic fatty infiltration.  4. Bilateral posterior dependent pulmonary mild atelectasis.     Labs:    Recent Labs  Lab 09/24/17  1812 09/24/17  1759 09/24/17  1718 09/24/17  1716   WBC  --   --   --  15.6*   HGB 8.4* 8.5* 8.8* 6.0*   HCT  --   --   --  18.4*   MCV  --   --   --  105*   PLT  --   --   --  144*          Lab Results   Component Value Date     09/24/2017     09/24/2017     09/24/2017    Lab Results   Component Value Date    CHLORIDE 88 09/24/2017    CHLORIDE 86 09/24/2017    CHLORIDE 81 09/24/2017    Lab Results   Component Value Date    BUN 42 09/24/2017    BUN 41 09/24/2017    BUN 43 09/24/2017      Lab Results   Component Value Date    POTASSIUM 4.9 09/24/2017    POTASSIUM 5.0 09/24/2017    POTASSIUM 5.5 09/24/2017    Lab Results   Component Value Date    CO2 17 09/24/2017    CO2 16 09/24/2017    CO2 30 12/09/2014    Lab Results   Component Value Date    CR 2.34 09/24/2017    CR 2.63 09/24/2017    CR 0.68 12/09/2014          Recent Labs  Lab 09/24/17  1716   INR 2.64*           Chris Choi MD  Hospitalist  [facility][MF1.1]           Revision History        User Key Date/Time User Provider Type Action    > MF1.3 9/24/2017 10:49 PM Charlie Choi MD Physician Addend     MF1.5 9/24/2017  9:13 PM Charlie Choi MD Physician Sign     MF1.2 9/24/2017  9:07 PM Charlie Choi MD Physician      MF1.4 9/24/2017  8:14 PM Charlie Choi MD Physician      MF1.1 9/24/2017  7:55 PM Charlie Choi MD Physician                      Discharge Summaries      Discharge Summaries by Kym Chicas MD at 10/4/2017  1:03 PM     Author:  Kym Chicas MD Service:  Hospitalist Author Type:  Physician    Filed:  10/4/2017  1:58 PM Date of Service:  10/4/2017  1:03 PM Creation Time:  10/4/2017  12:44 PM    Status:  Signed :  Kym Chicas MD (Physician)             [facility]  Discharge Summary  Hospitalist      Date of Admission:  9/24/2017  Date of Discharge:  10/4/2017  Provider:  Kym Chicas MD  Date of Service (when I last saw the patient): 10/04/17      Primary Provider: No primary care provider on file.          Discharge Diagnosis:   Discharge Diagnoses   Acute encephalopathy  Acute on chronic Liver disease  Shock due to Upper GI bleeding   Acute blood loss Anemia  Acute Kidney injury  Alcohol abuse and dependence  Severe Protein Calorie Malnutrition  Coagulopathy  Hyponatremia      Other medical issues:  Past Medical History:   Diagnosis Date     Other and unspecified hyperlipidemia      Substance abuse           History of Present Illness   Jon Toribio is an 62 year old male who presented after he was found unresponsive and CPR was initiated.  Please see the admission history and physical for full details.    Hospital Course     Jon Toribio was admitted on 9/24/2017.  He is a 62 year old male with hx of etoh abuse with dependence and hx of withdrawal who was drinking more lately and was found confused by a family member.  He then went unresponsive so the son started CPR and called EMS.  EMS found him to be hypoglycemic BG 39  And he was in shock with lactic acidosis of 16 in the ED.  He was intubated for airway protection as he was obtunded and admitted to the ICU on 9/24/2017.  He had brinda melena and was put on octreotide gtt, protonix gtt, and ceftriaxone for presumed UGIB an possibility of varices.  GI consulted and he had EGD 9/5 showing severe esophagitis with a superficial esophageal tear.  Initial labs showed acute hepatitis presumably from etoh so he was started on methylprednisolone for a high DFscore.  Ammonia level was up that resolved with lactulose enemas.  His sedation was weaned and was extubated morning of 9/28.  Has had evidence for  severe malnutrition and multiple electrolyte deficiencies that were replaced.  Diet was advanced to DD2 per SLP but minimal to no p.o intake. Patient was transferred to the medical floor on 10/1/17. Given minimal response to steroids and worsening Liver functions, steroids were discontinued  on 10/1/17.  Overall, poor prognosis.  Patient was not able to participate effectively in palliative care decisions.  Palliative care team was consulted. Given terminal diagnosis and poor prognosis family opted for comfort measures. Patient is discharged with plans for comfort cares and home hospice.    The following problems were addressed during his hospitalization:    Acute encephalopathy: obtunded on arrival, possibly from alcohol withdrawal and hepatic encephalopathy, but cannot say for sure as he just received CPR.  Etoh level negative.  Suppose seizure also in differential given heavy etoh use.  Initially did not require sedation despite intubation/mechanical ventilation.  Head CT without acute findings.  Patient was successfully extubated and mentally improved some  but not at baseline. He is communicative but talks minimally when prompted      Shock due to UGI Bleed and underlying cirrhosis:  ON pressors initially. improved      Acute blood loss anemia:  Rec'd 2 U PRBCs here.       UGI Bleed:  Melena on admit.  Secondary to severe esophagitis and superficial esophageal tear.  -treated with bid ppi      Acute on chronic alcoholic hepatitis:  Failed treatment with steroids      PENNY:  worsening renal functions    Alcohol abuse and dependence:   -- It is suspected he was drinking 1L alcohol per day.    -treated with CIWA with prn ativan     Ascites: initially was On ceftriaxone for SBP prophylaxis.       Pulmonary secretions:  Small opacity LLL on chest xray 9/27.  At risk for aspiration, still without fevers, WBC rising which is probably steroid effect.  Procal 0.5.        Low K, mag, PO4, Ca: replaced per   protocols      Hyponatremia, resolved:     Coagulopathy:  INR 2.64 on admit.  Likely combination of liver disease and nutritional deficiency.       Severe protein calorie malnutrition:   -Limited p.o. intake; given poor prognosis.     Leukocytosis: Likely secondary to steroids.  No signs of infection          Significant Results and Procedures   As noted    Pending Results   Unresulted Labs Ordered in the Past 30 Days of this Admission     Date and Time Order Name Status Description    10/1/2017 0819 Blood culture Preliminary     10/1/2017 0819 Blood culture Preliminary     9/24/2017 1832 Plasma prepare order unit In process           Code Status   DNR / DNI       Primary Care Physician   No primary care provider on file.    Physical Exam                      Vitals:    10/01/17 0603 10/02/17 0519 10/03/17 0500   Weight: 63 kg (139 lb) 64.5 kg (142 lb 3.2 oz) 63.3 kg (139 lb 9.6 oz)     Vital Signs with Ranges     I/O last 3 completed shifts:  In: 0   Out: 50 [Urine:50]    Constitutional: Awake, jaundiced, cooperative, tired  Respiratory: decreased breath sounds bases, poor inspiratory effort  Cardiovascular: tachycardic S1 and S2,   GI: + ascites, distended  EXT: 2+ edema    Discharge Disposition   Discharged to home    Consultations This Hospital Stay   COLORECTAL SURGERY IP CONSULT  INTENSIVIST IP CONSULT  GASTROENTEROLOGY IP CONSULT  SPEECH LANGUAGE PATH ADULT IP CONSULT  SWALLOW EVAL SPEECH PATH AT BEDSIDE IP CONSULT  NUTRITION SERVICES ADULT IP CONSULT  PHYSICAL THERAPY ADULT IP CONSULT  OCCUPATIONAL THERAPY ADULT IP CONSULT  PALLIATIVE CARE ADULT IP CONSULT  SOCIAL WORK IP CONSULT  SOCIAL WORK IP CONSULT    Time Spent on this Encounter   IKym, personally saw the patient today and spent greater than 30 minutes discharging this patient.    Discharge Orders     Reason for your hospital stay   Please refer to discharge summary. Briefly patient admitted with acute on chronic liver failure,  encephalopathy, Upper GI Bleed. Given his terminal illness and poor prognosis patient and his family decided to go home on hospice     Follow-up and recommended labs and tests    Home Hospice to follow up for comfort measures     Activity   Your activity upon discharge: activity as tolerated     DNR/DNI     Diet   Follow this diet upon discharge: Orders Placed This Encounter     Room Service     Snacks/Supplements Adult: Ensure Plus (Adult); With Meals     High Kcal/High Protein Diet, ADULT       Discharge Medications   Current Discharge Medication List      START taking these medications    Details   acetaminophen (TYLENOL) 650 MG Suppository Place 1 suppository (650 mg) rectally every 6 hours as needed for mild pain or fever (temperature over 100  F )  Qty: 10 suppository, Refills: 0    Associated Diagnoses: Hospice care patient      LORazepam (ATIVAN) 0.5 MG tablet Take 0.5-1 tablets (0.25-0.5 mg) by mouth, place under tongue or insert rectally every 3 hours as needed for anxiety  Qty: 20 tablet, Refills: 0    Associated Diagnoses: Hospice care patient      bisacodyl (DULCOLAX) 10 MG Suppository Place 1 suppository (10 mg) rectally daily as needed for other (for no bowel movement for 72 hours)  Qty: 10 suppository, Refills: 0    Associated Diagnoses: Hospice care patient      senna-docusate (SENOKOT-S;PERICOLACE) 8.6-50 MG per tablet Take 1 tablet by mouth 2 times daily  Qty: 100 tablet, Refills: 0    Associated Diagnoses: Hospice care patient      atropine 1 % ophthalmic solution Take 2-4 drops by mouth, place under tongue or place inside cheek every 2 hours as needed for other (terminal respiratory secretions) Not for ophthalmic use.  Qty: 5 mL, Refills: 1    Associated Diagnoses: Hospice care patient      haloperidol (HALDOL) 1 MG tablet Take 1-2 tablets (1-2 mg) by mouth, place under tongue or insert rectally every 6 hours as needed for agitation (nausea)  Qty: 30 tablet, Refills: 1    Associated Diagnoses:  Hospice care patient      HYDROmorphone, HIGH CONC, (DILAUDID) 10 mg/mL LIQD oral Take 0.1-0.2 mLs (1-2 mg) by mouth or place under tongue every 2 hours as needed for moderate to severe pain (and/or  shortness of breath)  Qty: 30 mL, Refills: 0    Associated Diagnoses: Hospice care patient           Allergies   No Known Allergies  Data   Most Recent 3 CBC's:  Recent Labs   Lab Test  10/03/17   0630  10/02/17   0641  10/01/17   0720   WBC  28.1*  34.2*  23.7*   HGB  8.4*  8.8*  9.2*   MCV  96  98  100   PLT  168  194  154      Most Recent 3 BMP's:  Recent Labs   Lab Test  10/03/17   0630  10/02/17   0641  10/01/17   0720   NA  131*  132*  133   POTASSIUM  4.8  4.8  4.6   CHLORIDE  99  99  102   CO2  22  23  22   BUN  52*  42*  29   CR  2.03*  1.59*  1.11   ANIONGAP  10  10  9   HILTON  7.3*  7.9*  7.9*   GLC  67*  77  72     Most Recent 2 LFT's:  Recent Labs   Lab Test  10/03/17   0630  10/02/17   0641   AST  108*  112*   ALT  84*  88*   ALKPHOS  454*  421*   BILITOTAL  23.3*  22.8*     Most Recent INR's and Anticoagulation Dosing History:  Anticoagulation Dose History     Recent Dosing and Labs Latest Ref Rng & Units 9/24/2017 9/24/2017 9/25/2017 9/26/2017 10/1/2017 10/2/2017    INR 0.86 - 1.14 2.64(H) 2.10(H) 2.00(H) 1.85(H) 2.01(H) 1.58(H)        Most Recent 3 Troponin's:No lab results found.  Most Recent Cholesterol Panel:  Recent Labs   Lab Test  09/28/17   0445   TRIG  Canceled, Test credited     Most Recent 6 Bacteria Isolates From Any Culture (See EPIC Reports for Culture Details):  Recent Labs   Lab Test  10/01/17   1400  10/01/17   0903  10/01/17   0855  09/24/17   2334  09/24/17   2025  09/24/17   1720   CULT  10,000 to 50,000 colonies/mL  Enterococcus faecalis  *  <10,000 colonies/mL  urogenital sonya  Susceptibility testing not routinely done    No growth after 3 days  No growth after 3 days  Canceled, Test credited  Duplicate request  Charge credited    No growth  No growth     Most Recent TSH, T4 and A1c  Labs:No lab results found.  Results for orders placed or performed during the hospital encounter of 09/24/17   CT Head w/o Contrast    Narrative    CT SCAN OF THE HEAD WITHOUT CONTRAST   9/24/2017 6:44 PM     HISTORY: Unresponsive.    TECHNIQUE:  Axial images of the head and coronal reformations without  IV contrast material.  Radiation dose for this scan was reduced using  automated exposure control, adjustment of the mA and/or kV according  to patient size, or iterative reconstruction technique.    COMPARISON: None.    FINDINGS: There is some mild cerebral atrophy. Minimal patchy white  matter changes are present in both hemispheres without mass effect.  There is no evidence for intracranial hemorrhage, mass effect, acute  infarct, or skull fracture. Visualized paranasal sinuses and mastoid  air cells are clear. Vascular calcifications noted.      Impression    IMPRESSION: Chronic changes. No evidence for intracranial hemorrhage  or any acute process.    CHERRY MADDOX MD   XR Chest Port 1 View    Narrative    CHEST PORTABLE ONE VIEW   9/24/2017 6:01 PM     HISTORY: Unresponsive.    COMPARISON: None.      Impression    IMPRESSION: ET tube and nasogastric tube in good position. Heart size  and pulmonary vascular normal. Lungs are clear.    CHERRY MADDOX MD   Cervical spine CT w/o contrast    Narrative    CT CERVICAL SPINE WITHOUT CONTRAST   9/24/2017 6:44 PM     HISTORY: Unresponsive.     TECHNIQUE: Axial images of the cervical spine were obtained without  intravenous contrast. Multiplanar reformations were performed.  Radiation dose for this scan was reduced using automated exposure  control, adjustment of the mA and/or kV according to patient size, or  iterative reconstruction technique.     COMPARISON: None.    FINDINGS: Sagittal reconstructions demonstrate minimal degenerative  spondylolisthesis of C4 on C5 measuring approximately 2 to 3 mm. Disc  space narrowing is present at C5-C6. There is no evidence for  any  acute fracture.    C1-C2: Degenerative changes are present. There is no stenosis.    C2-C3: Left-sided uncinate spurring and minimal facet hypertrophy is  present. There is no stenosis.    C3-C4: Moderate left-sided facet hypertrophy and posterior osteophyte  formation is present. There is also some left-sided uncinate spurring.  There is moderate left-sided foraminal stenosis and mild central canal  stenosis.    C4-C5: Uncinate spurring and facet hypertrophy is present on the left  greater than right causing some moderate left-sided foraminal  stenosis. There is also mild central canal stenosis due to broad-based  disc bulging and some minimal spondylolisthesis.    C5-6: Broad-based posterior osteophyte formation and facet hypertrophy  is present causing moderate central canal stenosis and moderate to  severe bilateral neural foraminal stenosis.    C6-7: Posterior osteophyte formation, disc bulge and facet hypertrophy  is present causing mild to moderate bilateral neural foraminal  stenosis and mild central canal stenosis.    C7-T1: Normal.      Impression    IMPRESSION:  1. Grade 1 degenerative spondylolisthesis at C4-C5.  2. Multilevel degenerative disc and facet disease most advanced at  C5-C6 where there is moderate central canal stenosis and moderate to  severe bilateral neural foraminal stenosis.  3. No evidence for fracture.    CHERRY MADDOX MD   CT Chest Abdomen Pelvis w/o Contrast    Narrative    CT CHEST, ABDOMEN, PELVIS WITHOUT CONTRAST 9/24/2017 6:45 PM     HISTORY: Unresponsive.    Radiation dose for this scan was reduced using automated exposure  control, adjustment of the mA and/or kV according to patient size, or  iterative reconstruction technique.    FINDINGS:   Chest: Endotracheal tube tip is approximately 4 cm from the ailyn  midway between the clavicles and ailyn. Nasogastric tube extends into  the stomach. The lungs are grossly clear with only mild posterior  dependent atelectasis at the  lung bases. No pleural effusion or  pneumothorax is demonstrated. The mediastinum and josselyn are grossly  unremarkable for the unenhanced technique.    Abdomen/pelvis: There is marked hepatic fatty infiltration. Contents  of the gallbladder are relatively radiodense measuring approximately  30 HU. Mild to moderate peritoneal fluid is noted within the pelvis,  paracolic gutters, and adjacent to the spleen. No renal stones are  demonstrated. There is no hydronephrosis. The bowel is not well  evaluated without contrast enhancement. Diffuse small bowel wall  thickening and colonic wall thickening may be present. Sigmoid  diverticulosis is also noted. A Street catheter is present within the  urinary bladder which is relatively collapsed.      Impression    IMPRESSION:  1. Nonspecific peritoneal fluid within the abdomen and pelvis.  2. Evaluation of the bowel is suboptimal due to lack of contrast  enhancement. Diffuse bowel wall thickening may be present. There is no  definitive evidence of bowel obstruction. No free peritoneal air.  3. Severe hepatic fatty infiltration.  4. Bilateral posterior dependent pulmonary mild atelectasis.    RAMIN GUILLORY MD   XR Chest Port 1 View    Narrative    XR CHEST PORT 1 VW  9/27/2017 6:10 AM      HISTORY: Intubated - lots of secretions.     COMPARISON: 9/24/2017.    FINDINGS: Supine portable chest. ET tube in place with tip  approximately 3 cm above the ailyn. No pneumothorax. There is a left  pleural effusion and increasing left basilar infiltrate. The lungs are  otherwise clear.      Impression    IMPRESSION: Left pleural effusion and left basilar infiltrate.    OZIEL SNYDER MD   XR Chest Port 1 View    Narrative    CHEST ONE VIEW SEMI-UPRIGHT 9/28/2017 3:18 PM     HISTORY: Follow up infiltrate    COMPARISON: 9/27/2017      Impression    IMPRESSION: Left basilar infiltrate and effusion are unchanged. Slight  increase in minimal right lower lobe atelectasis and/or infiltrate.    JHONATAN  MD CHAIM   XR Chest 2 Views    Narrative    CHEST TWO VIEWS  10/1/2017 9:55 AM     COMPARISON: Frontal chest x-ray 9/28/2017    HISTORY: Persistent hypoxia and leukocytosis, evaluate for pneumonia.      Impression    IMPRESSION: Tiny bilateral pleural effusions again noted. There are no  airspace opacities to suggest pneumonia. There is no pneumothorax.  Heart size is normal with no evidence for congestive failure.    TERE SANCHEZ MD             Disclaimer: This note consists of symbols derived from keyboarding, dictation and/or voice recognition software. As a result, there may be errors in the script that have gone undetected. Please consider this when interpreting information found in this chart.[SM1.1]     Revision History        User Key Date/Time User Provider Type Action    > SM1.1 10/4/2017  1:58 PM Kym Chicas MD Physician Sign                     Consult Notes      Consults by White, Corinne C, LSW at 10/3/2017  2:57 PM     Author:  White, Corinne C, LSW Service:  (none) Author Type:      Filed:  10/3/2017  2:57 PM Date of Service:  10/3/2017  2:57 PM Creation Time:  10/3/2017  2:44 PM    Status:  Signed :  White, Corinne C, LSW ()     Consult Orders:    1. Social Work IP Consult [[id number]] ordered by Abeba Gilbert APRN CNS at 10/03/17 0954     2. Social Work IP Consult [[id number]] ordered by Charlie Choi MD at 10/02/17 1035                Care Transition Initial Assessment -   Reason For Consult: discharge planning  Met with: Family    Active Problems:    GI hemorrhage         DATA  Lives With: child(renata), adult  Living Arrangements: house  Description of Support System: Supportive, Involved  Who is your support system?: Children, Sibling(s)  Support Assessment: Adequate family and caregiver support, Adequate social supports. PT will have support from her son and his sister to provide the care pt will need once home.   Identified  issues/concerns regarding health management: Pt is now comfort care due to his health issues related to his ETOH history.       Resources List: Hospice  [facility] [phone] fax [phone]     Quality Of Family Relationships: supportive  Transportation Available: family or friend will provide      ASSESSMENT  Cognitive Status:  alert  Concerns to be addressed: Pt will need support from family and Hospice. Pt has not seen a primary care MD in years. [facility] has established that pt is out of network to use [facility]. [facility] is in network for pt. Spoke with [facility] to ensure they understood their medical director would need to be the primary care MD for pt  Spoke with pt's son Phill. HE is aware of the need for family to provide the 24 hour support. Pt really wants to be home. Family are wanting to honor pt's wishes.  Spoke with son about how Hospice will support he and pt, medication support and DME.  Son wanted to speak to Hospice to set up his own meeting.   Abeba Augustine will speak with Hospice about med set up for pt at home as well        PLAN  Family may need at some point to bring in additional care for pt/ per the son, he believes pt has money to do this although pt's ex wife is managing his funds  Family plan to provide transport to home tomorrow.   Will coordinate with [facility] at [facility] and needs at [place][CW1.1]       Revision History        User Key Date/Time User Provider Type Action    > CW1.1 10/3/2017  2:57 PM White, Corinne C, LSW  Sign            Consults by Abeba Gilbert APRN CNS at 10/2/2017 10:33 AM     Author:  Abeba Gilbert APRN CNS Service:  Palliative Author Type:  Clinical Nurse Specialist    Filed:  10/2/2017  5:33 PM Date of Service:  10/2/2017 10:33 AM Creation Time:  10/2/2017 10:33 AM    Status:  Addendum :  Abeba Gilbert APRN CNS (Clinical Nurse Specialist)         [facility]  "Hospital    Palliative Care Consultation   Text Page    Date of Admission:  9/24/2017[AK1.1]  1630 Addendum:  Met with Son Sherice in presence of patient who is more alert but still does not participate and Sister Kelsea.  Reviewed below information about his understanding of prognosis which he appears to understand well, concept of comfort care, nutrition and code status in setting of comfort care with Sherice.  He appears distressed about making any decisions about patient but then states, \" This is not what I want, but I know what my dad would want.\"  He states he needs to review things with his brother.  We plan to revisit via phone tomorrow.  He tells me he has support also in his mother who is a teacher (patient's ex-wife) and we discussed involvement of  for assistance setting up care outside hospital when appropriate and as help to address and financial concerns.  [id number]  1200[AK1.2] Addendum: Met with patient's sister Kelsea with patient who was unable to stay awake to participate well.  Reviewed course of illness with Kelsea and notes from yesterday from Dr. Jefferson about prognosis.  Kelsea feels strongly that patient would not want \"heroic measures\" and would likely drink again if he could.  She feels he would be miserable in any type of rehab program or care center. We discussed option of comfort care focus and hospice care.  We also discussed option of artificial feeding but unclear benefit.  Kelsea feels it would be a risk with known esophageal bleed and patient would pull it out anyway.  She does not think patient would want to be coded.  She would like to discuss the above with patient's son Sherice when he arrives this afternoon.  She would be open to caring for patient at home with hospice care.  We also spent some time reflecting on their life together as siblings growing up in [facility]. She is greiving the loss of her brother but rightly attempting to consider planning for his care with what she " thinks he would want in mind.  Additional time [id number].[AK1.3]     Assessment & Plan   Jon Toribio is a 62 year old male who was admitted on 9/24/2017. I was asked to see the patient for goals of care and support in decision making.    Recommendations:  1.[AK1.1]Pain, Epigastric- r/t ascites? -IV diludid this AM with decrease LOC, reduce dose in setting of renal and hepatic failure. ,[AK1.4]   2.[AK1.1] Nutrition- Noted concern per dietician, discussed with Hospitalist.  Unclear value in nutrition in currrent medical condition with poor prognosis.  Will discuss further in context of goals of care as able with family.[AK1.4]  Goal of Care:[AK1.1]Full Code, Plan to readdress code status and goals of care in setting of new poor prognosis.  Sister Kelsea and son Sherice to be in at undetermined time later today.[AK1.4]      Disease Process/es & Symptoms:  Jon Toribio is a 62 year old patient admitted with symptoms of unresponsiveness and hypoglycemia from home in setting of alcoholism and binge.[AK1.1]  He initially was given CPR by son at instruction of , but was breathing and had a pulse when EMS arrived. He was initially treated for shock thought related to UGI bleed and cirrhosis, he is now weaned off pressors and has no further evidence of bleed which is thought related to severe esophagitis and superficial esophageal tear.  He was obtunded on admission and has cleared significantly but remains slow to respond and unable to answer more than simple yes/no questions currently.  His most significant concern is acute on chronic alcoholic hepatitis which has worsened despite course of steroids leaving him an overall poor prognosis.  Noted discussion with patient son Marlen and friend on 10/1.[AK1.4]  This is in the setting of[AK1.1] depression and alcoholism.[AK1.4]    The following symptoms are noted by patient as concerning to his quality of life.[AK1.1]  Pain - noted epigastric, reported B LE pain  "to nurse.[AK1.4]    Psychosocial/Spiritual Needs:[AK1.1]   is following. Consultation placed for  to follow.[AK1.4]    Decision-Making & Goals of Care:  Discussion/counseling today about goals of care/decisions:[AK1.1]   Spoke with son Sherice via phone.  Conversation was brief as he had to return to work.  He noted hearing bad news yesterday and knowledge that his father had to \"complete his last will and testament.\"  He states patient's ex-girlfriend should be allowed to have information and patient's sister will be arriving from [place] later today.  As patient is having difficulty communicating well and has unclear understanding of current situation, requested family consider when they would be available for further planning of care.  Will also discuss with sister if able when she arrives.[AK1.4]    Patient has decision-making capacity[AK1.1] Questionable[AK1.4]  Patient has[AK1.1] no known legal document designating a decision maker. Per policy next of kin is the designated decision maker. See System Informed Consent policy for guidance.  **Patient's son refers to a possible legal document from when patient was  but assuming it would no longer be valid after divorce.  I have asked him to bring it in for review.  1st- Adult Children- Sherice and Brian Toribio  2nd - Parents[AK1.4] [AK1.3]  3rd- Adult siblings- Kelsea is older sibling and involved in hospitalization[AK1.4], Brother Tres is , another half-sister is reported uninvolved.[AK1.3]    Patient has a completed health care directive available in the chart (Y/N):[AK1.1] N  There is no POLST (Physician orders for life-sustaining treatment) form on file for this patient[AK1.4]  Code Status:[AK1.1] Full code[AK1.4]     Findings & plan of care discussed with:[AK1.1] MD, Nursing and SW[AK1.4]  Follow-up plan from palliative team:[AK1.1] Will attempt to meet with Sister Kelsea when she arrives.[AK1.4]  Thank you for involving us " in the patient's care.     Abeba Gilbert[AK1.1] APRN, CNS[AK1.4]  Pain Management and Palliative Care  [facility]  Pgr: [phone]    Time Spent on this Encounter   I spent[AK1.1]  35[AK1.4] minutes in assessment of the patient and discussion with the patient and family. Another[AK1.1] 35[AK1.4] minutes in review of chart, documentation and discussion with the health care team.    Reason for Consult   Reason for consult: I was asked by[AK1.1] Dr. Mcwilliams[AK1.4] to evaluate this patient for[AK1.1] Goals of care  Patient and family support.[AK1.4]    Primary Care Physician   No primary care provider on file.    Chief Complaint[AK1.1]   Epigastric pain    History is obtained from the patient, electronic health record and patient's son Sherice.[AK1.4]    History of Present Illness   Jon Toribio is a 62 year old male who presents with  symptoms of unresponsiveness and hypoglycemia from home in setting of alcoholism and binge.[AK1.1]  He initially was given CPR by son at instruction of , but was breathing and had a pulse when EMS arrived. He was initially treated for shock thought related to UGI bleed and cirrhosis, he is now weaned off pressors and has no further evidence of bleed which is thought related to severe esophagitis and superficial esophageal tear.  He was obtunded on admission and has cleared significantly but remains slow to respond and unable to answer more than simple yes/no questions currently.  His most significant concern is acute on chronic alcoholic hepatitis which has worsened despite course of steroids leaving him an overall poor prognosis.  Noted discussion with patient son Marlen and friend on 10/1.[AK1.4]  This is in the setting of[AK1.1] depression and alcoholism.[AK1.4]    The following symptoms are noted by patient as concerning to his quality of life.[AK1.1]  Pain - noted epigastric, reported B LE pain to nurse.[AK1.4]      Past Medical History[AK1.1]   I  have reviewed this patient's medical history and updated it with pertinent information if needed.[AK1.4]   Past Medical History:   Diagnosis Date     Other and unspecified hyperlipidemia      Substance abuse[AK1.5]        Past Surgical History[AK1.1]   I have reviewed this patient's surgical history and updated it with pertinent information if needed.[AK1.4]  Past Surgical History:   Procedure Laterality Date     C NONSPECIFIC PROCEDURE      Vasectomy     ESOPHAGOSCOPY, GASTROSCOPY, DUODENOSCOPY (EGD), COMBINED N/A 9/25/2017    Procedure: COMBINED ESOPHAGOSCOPY, GASTROSCOPY, DUODENOSCOPY (EGD);  Esophagoscopy, Gastroscopy, Duodenoscopy ;  Surgeon: Kota Ospina MD;  Location:  GI[AK1.5]       Prior to Admission Medications   None     Allergies   No Known Allergies    Social History   I have updated and reviewed the following Social History Narrative:   Social History     Social History Narrative     Living situation:[AK1.1] Home with son Sherice[AK1.4]  Family system:[AK1.1] , (ex?) girlfriend, Damari Rivas, Sons Sherice and son Brian who lives in [place].  Older sibling Kelsea involved and lives in [place].[AK1.4]  Functional status (needs help with ADLs or IADLs):[AK1.1] needs assist will all ADL's unclear baseline[AK1.4]  Employment/education:[AK1.1] unknown[AK1.4]  Use of community resources:[AK1.1] no[AK1.4]  Activities/interests:[AK1.1] unknown[AK1.4]  History of substance use/abuse:[AK1.1] yes[AK1.4]  [place] affiliation:[AK1.1] unknown[AK1.4]  Involvement in [facility] community:[AK1.1] no[AK1.4]    Family History[AK1.1]   I have reviewed this patient's family history and updated it with pertinent information if needed.[AK1.4]   Family History   Problem Relation Age of Onset     HEART DISEASE Father      Neurologic Disorder Mother      dementia     Dementia Mother      CANCER Brother      throat[AK1.6]       Review of Systems[AK1.1]   The 10 point Review of Systems is negative other than noted in the  HPI or here.   Limited due to difficult for patient to participate/communicate, slow to no aswers.[AK1.4]    Palliative Symptom Review (0=no symptom/no concern, 1=mild, 2=moderate, 3=severe):      Pain:[AK1.1] 1-mild[AK1.4]      Fatigue:       Nausea:[AK1.1] 0-none[AK1.4]      Constipation:[AK1.1] 0-none[AK1.4]      Diarrhea:[AK1.1] 0-none[AK1.4]      Depressive Symptoms:       Anxiety:       Drowsiness:       Poor Appetite:[AK1.1] 3-severe[AK1.4]      Shortness of Breath:[AK1.1] 0-none[AK1.4]      Insomnia:[AK1.1] 2-moderate[AK1.4]    Physical Exam[AK1.1]   Temp:  [96.1  F (35.6  C)-97.5  F (36.4  C)] 96.1  F (35.6  C)  Heart Rate:  [] 103  Resp:  [20-24] 20  BP: (91-98)/(54-62) 96/62  SpO2:  [90 %-94 %] 90 %[AK1.7]  142 lbs 3.2 oz  GEN:[AK1.1]  Appears to have difficulty focusing, awake,[AK1.4] oriented x[AK1.1] 2-3, unable to answer complex questions or even name months of the year for me.  Nurseing notes more alert prior to dilaudid this AM and oriented x3 at that time[AK1.4], appears comfortable, NAD.  HEENT:  Normocephalic/atraumatic, scleral icterus, no nasal discharge, mouth moist.  CV:  RRR, S1, S2; no murmurs or other irregularities noted.  +3 DP/PT pulses bilatererally;[AK1.1]  3+[AK1.4] edema[AK1.1] LE up to abdomen[AK1.4].  RESP:  Clear to auscultation bilaterally without rales/rhonchi/wheezing/retractions.  Symmetric chest rise on inhalation noted.  Normal respiratory effort.  ABD:  Rounded, tender[AK1.1] in epigastric area,[AK1.4] distended.  +BS  EXT:  Edema & pulses as noted above.  CMS intact x 4.     SKIN:[AK1.1]  Jaundiced,[AK1.4] Dry to touch, no exanthems noted in the visualized areas.    NEURO:[AK1.1] Generalized weakness, no focal deficits.[AK1.4]     Psych:[AK1.1]  Flat[AK1.4] affect.  Calm, cooperative,[AK1.1] slow to respond[AK1.4].     Delirium Screen/CAM:  Delirium = (#1 and #2 = YES) + (#3 and/or #4)   1) Acute onset and fluctuating course:[AK1.1]   YES[AK1.4]   (acute change in  mental status from baseline over last 24 hours)  2) Inattention:[AK1.1]   YES[AK1.4]   (difficulty focusing, distractible, can't follow conversation)  3) Disorganized thinking:[AK1.1]   YES[AK1.4]   (score only if #1 and #2 are YES)  (rambling/irrelevant conversation, unclear/illogical thoughts, inconsistency)  4) Altered level of consciousness:[AK1.1]   YES[AK1.4]   (score only if #1 and #2 are YES)  (other than alert, calm, cooperative)    Delirium/CAM score:[AK1.1] 4[AK1.4]/4  Interpretation:  1)  Delirium:[AK1.1]  Present[AK1.4]  2)  Type:[AK1.1]  hypoactive[AK1.4]  3)  Severity:[AK1.1]  moderate[AK1.4]    Data[AK1.1]   Results for orders placed or performed during the hospital encounter of 09/24/17 (from the past 24 hour(s))   UA with Microscopic reflex to Culture   Result Value Ref Range    Color Urine Tonya     Appearance Urine Cloudy     Glucose Urine 50 (A) NEG^Negative mg/dL    Bilirubin Urine Moderate (A) NEG^Negative    Ketones Urine Negative NEG^Negative mg/dL    Specific Gravity Urine 1.017 1.003 - 1.035    Blood Urine Moderate (A) NEG^Negative    pH Urine 5.0 5.0 - 7.0 pH    Protein Albumin Urine Negative NEG^Negative mg/dL    Urobilinogen mg/dL 4.0 (H) 0.0 - 2.0 mg/dL    Nitrite Urine Negative NEG^Negative    Leukocyte Esterase Urine Negative NEG^Negative    Source Midstream Urine     WBC Urine 17 (H) 0 - 2 /HPF    RBC Urine 2 0 - 2 /HPF    Bacteria Urine Few (A) NEG^Negative /HPF    Mucous Urine Present (A) NEG^Negative /LPF    Hyaline Casts 7 (H) 0 - 2 /LPF    Granular Casts 16 (A) NEG^Negative /LPF    Cellular Cast 20 (A) NEG^Negative /LPF    Amorphous Crystals Few (A) NEG^Negative /HPF   Urine Culture Aerobic Bacterial   Result Value Ref Range    Specimen Description Midstream Urine     Special Requests Specimen received in preservative     Culture Micro Culture in progress    Comprehensive metabolic panel   Result Value Ref Range    Sodium 132 (L) 133 - 144 mmol/L    Potassium 4.8 3.4 - 5.3  mmol/L    Chloride 99 94 - 109 mmol/L    Carbon Dioxide 23 20 - 32 mmol/L    Anion Gap 10 3 - 14 mmol/L    Glucose 77 70 - 99 mg/dL    Urea Nitrogen 42 (H) 7 - 30 mg/dL    Creatinine 1.59 (H) 0.66 - 1.25 mg/dL    GFR Estimate 44 (L) >60 mL/min/1.7m2    GFR Estimate If Black 54 (L) >60 mL/min/1.7m2    Calcium 7.9 (L) 8.5 - 10.1 mg/dL    Bilirubin Total 22.8 (HH) 0.2 - 1.3 mg/dL    Albumin 1.6 (L) 3.4 - 5.0 g/dL    Protein Total 4.9 (L) 6.8 - 8.8 g/dL    Alkaline Phosphatase 421 (H) 40 - 150 U/L    ALT 88 (H) 0 - 70 U/L     (H) 0 - 45 U/L   CBC with platelets   Result Value Ref Range    WBC 34.2 (H) 4.0 - 11.0 10e9/L    RBC Count 2.70 (L) 4.4 - 5.9 10e12/L    Hemoglobin 8.8 (L) 13.3 - 17.7 g/dL    Hematocrit 26.4 (L) 40.0 - 53.0 %    MCV 98 78 - 100 fl    MCH 32.6 26.5 - 33.0 pg    MCHC 33.3 31.5 - 36.5 g/dL    RDW 21.7 (H) 10.0 - 15.0 %    Platelet Count 194 150 - 450 10e9/L   INR   Result Value Ref Range    INR 1.58 (H) 0.86 - 1.14   Magnesium   Result Value Ref Range    Magnesium 2.0 1.6 - 2.3 mg/dL   Lactic acid level STAT   Result Value Ref Range    Lactic Acid 2.2 (H) 0.7 - 2.0 mmol/L[AK1.8]                Revision History        User Key Date/Time User Provider Type Action    > AK1.2 10/2/2017  5:33 PM Abeba Gilbert APRN CNS Clinical Nurse Specialist Addend     AK1.3 10/2/2017  2:47 PM Abbea Gilbert APRN CNS Clinical Nurse Specialist Addend     AK1.8 10/2/2017 11:55 AM Abeba Gilbert APRN CNS Clinical Nurse Specialist Sign     AK1.7 10/2/2017 11:42 AM Abeba Gilbert APRN CNS Clinical Nurse Specialist      AK1.6 10/2/2017 11:40 AM Abeba Gilbert APRN CNS Clinical Nurse Specialist      AK1.5 10/2/2017 11:39 AM Abeba Gilbert APRN CNS Clinical Nurse Specialist      AK1.4 10/2/2017 11:10 AM Abeba Gilbert APRN CNS Clinical Nurse Specialist      AK1.1 10/2/2017 10:33 AM Abeba Gilbert APRN CNS Clinical Nurse Specialist             Consults by Pass,  "Caridad MERRITT RD, LD at 9/28/2017 10:43 AM     Author:  Caridad Gannon RD, LD Service:  Nutrition Author Type:  Registered Dietitian    Filed:  9/28/2017  1:23 PM Date of Service:  9/28/2017 10:43 AM Creation Time:  9/28/2017 10:39 AM    Status:  Signed :  Caridad Gannon RD, LD (Registered Dietitian)     Consult Orders:    1. Nutrition Services Adult IP Consult [[id number]] ordered by Keith Jefefrson MD at 09/28/17 1124                CLINICAL NUTRITION SERVICES  -  ASSESSMENT NOTE      Recommendations Ordered by Registered Dietitian (JOSE MIGUEL):   Oral nutrition supplements  MVI+M   Future/Additional Recommendations:[RP1.1]    Folic acid 1 mg, MVI+M, thiamine 100 mg when medically able[RP1.2]   Malnutrition:     Severe malnutrition     REASON FOR ASSESSMENT  Jon Toribio is a 62 year old male seen by the dietitian for[RP1.1] MD consult - malnutrition and alcoholic[RP1.2]    NUTRITION HISTORY  - Information obtained from patient[RP1.1] although very limited specifics obtained[RP1.2]  - Patient is on a[RP1.1] ?regular[RP1.2] diet at home.[RP1.1] Eats breakfast \"sometimes\" and unable to gather details on lunch or dinner meal. Denies supplement use. States he and significant other share meal preparation duties. ETOH abuse note. Also endorsed weight loss but unable to quantify[RP1.2]  - Food allergies/intolerances: NKFA    CURRENT NUTRITION ORDERS  - Diet:[RP1.1] Clear liquids (SLP to see this afternoon)[RP1.2]  - Supplement:[RP1.1] none[RP1.2]  Current Intake/Tolerance:  - Per flow sheet review, no intake for meals documented x 4 days with intubation. NG unable to be placed due to esophagitis    PHYSICAL FINDINGS  Observed[RP1.1]  Fat wasting:    Orbital region and surrounding area - hollow look    Upper arm region / triceps/biceps - very little space between folds with bone structure very pronounced  Muscle wasting:    Temple - moderate hollowing    Clavicle and acromion bone region: deltoid muscle - " "shoulder to arm joint look square with acromion protrusion prominent; clavicle pronounced    Dorsal hand / Interosseous Muscle - depressed area between thumb and forefinger    Patellar/quadriceps region - bones pronounced    Anterior thigh region - depression/line on thigh and obviously thin    Gastrocnemius/Posterior Calf region - not well developed    Gross atrophy and fragile skin, bruising[RP1.2]  Obtained from Chart/Interdisciplinary Team  Stanley nutrition score: 2; total score: 12  Skin: fragile, multiple skin tears  Fluid status: +2-3 generalized and BLE edema[RP1.1], ascites[RP1.2]  CIWA trending[RP1.1]  Severe malnutrition[RP1.2]    ANTHROPOMETRICS  Height: 5'10\"  Weight: 60 kg[RP1.1]  BMI is 20.65 kg /m^2[RP1.2]  Weight Status:  Normal BMI  IBW: 75.5 kg, 79% of IBW   Weight History:  Unable to evaluate with no recent weight documentation in EMR; weight has trended up since admit  Wt Readings from Last 10 Encounters:   09/28/17 65.6 kg (144 lb 10 oz)   10/03/06 66.2 kg (146 lb)   09/18/06 65.8 kg (145 lb)       LABS  Labs reviewed[RP1.1]  Na 129 (L), LFT's elevated; P04 1.9 (L)[RP1.3]     MEDICATIONS  Medications reviewed  Solu medrol; Levophed  Infuvite   D5 IVF at 50 mL per hour provides 204 kcal, 60 gm CHO    PROCEDURES WITH NUTRITIONAL IMPLICATIONS  9/24 - 9/28: intubated for airway protection    ASSESSED NUTRITION NEEDS (PER APPROVED PRACTICE GUIDELINES, Dosing weight: 60 Kg)  Estimated Energy Needs: 2896-4867+ kcals (30-35+ Kcal/Kg)  Justification: maintenance  Estimated Protein Needs:   grams protein (1.5-2 g pro/Kg)  Justification: maintenance  Estimated Fluid Needs: >1 mL/Kcal  Justification: maintenance    MALNUTRITION:  % Weight Loss:[RP1.1] Unable to determine[RP1.2]  % Intake:[RP1.1]</= 50% for >/= 5 days (severe malnutrition) and suspect <75% for >1 month with ETOH abuse hx[RP1.2]  Subcutaneous Fat Loss:[RP1.1] Moderate to severe, as noted above[RP1.2]  Muscle Loss:[RP1.1] Moderate to " severe, as noted above[RP1.2]  Fluid Retention:[RP1.1]Mild[RP1.2]     Malnutrition Diagnosis:[RP1.1] Severe malnutrition[RP1.2]  In Context of:[RP1.1]  Acute illness or injury and Environmental or social circumstances[RP1.2]    NUTRITION DIAGNOSIS:[RP1.1]  Malnutrition[RP1.2] related to[RP1.1] ETOH abuse and inadequate intake 2/2 upper GI bleed as[RP1.2] evidenced by[RP1.1] fat and muscle wasting, meeting <50% of estimated needs for > 5 days[RP1.2]    INTERVENTIONS  Recommendations / Nutrition Prescription  Diet advancement per MD[RP1.1]/SLP[RP1.2] + Diet appropriate oral nutrition supplements to increase calorie and protein intake[RP1.1]    Transition to oral MVI, thiamine and folic acid as medically appropriate[RP1.2]    Implementation  Nutrition education:[RP1.1] Not appropriate at this time due to patient condition[RP1.2]  Medical food supplement: diet appropriate supplements 10-2 as diet advances  Multivitamin/Minerals: Thera-Vit-M  Collaboration and Referral of care: Discussed patient during interdisciplinary care rounds this morning    Goals  Diet >/=FL within 24-48 hours    MONITORING AND EVALUATION:  Diet order - advancement, adequacy  Progress towards goals will be monitored and evaluated per protocol and Practice Guidelines      [place] Person, [facility]  3rd floor/ICU: [phone]  All other floors: [phone]  Weekend/holiday: [phone]  Office: [phone][RP1.1]     Revision History        User Key Date/Time User Provider Type Action    > RP1.2 9/28/2017  1:23 PM Caridad Gannon RD, LD Registered Dietitian Sign     RP1.3 9/28/2017 10:43 AM Caridad Gannon RD, LD Registered Dietitian      RP1.1 9/28/2017 10:39 AM Caridad Gannon RD, LD Registered Dietitian             Consults by Kika Bullard PA-C at 9/25/2017  9:35 AM     Author:  Kika Bullard PA-C Service:  Gastroenterology Author Type:  Physician Assistant - C    Filed:  9/25/2017 10:18 AM Date of Service:   9/25/2017  9:35 AM Creation Time:  9/25/2017  9:34 AM    Status:  Attested :  Kika Bullard PA-C (Physician Assistant - C)    Cosigner:  Kota Ospina MD at 9/25/2017  2:02 PM         Consult Orders:    1. Gastroenterology IP Consult: suspected upper gi bleed; Consultant may enter orders: Yes; Patient to be seen: Routine - within 24 hours; Requested Clinic/Group: [facility] [[id number]] ordered by Charlie Choi MD at 09/24/17 2248           Attestation signed by Kota Ospina MD at 9/25/2017  2:02 PM        Physician Attestation   I, Kota Ospina, saw and evaluated Jon Toribio as part of a shared visit.  I have reviewed and discussed with the advanced practice provider their history, physical and plan.    I personally reviewed the vital signs, medications, labs and imaging.    My key history or physical exam findings: 61 y/o male with hx EtOH abuse, on-going.  Found unresponsive at home.  Noted to have macrocytic anemia upon admission (hgb=6), elevated INR, and abnormal LFTs.  Symptoms included possible GI bleeding (dark NG contents, dark stools).    Key management decisions made by me: EtOH intoxication with EtOH hepatitis, complicated by question of GI bleeding and altered mental status after being found down.  Will proceed with EGD today to evaluate/treat any source of upper GI blood loss.  Further management will largely involve ICU cares and be dependent upon whether his mental status starts to improve.    Kota Ospina  Date of Service (when I saw the patient): 09/25/17                               GASTROENTEROLOGY CONSULTATION      Jon Toribio  [address] [facility] [id number]  62 year old male     Admission Date/Time: 9/24/2017  Primary Care Provider: No primary care provider on file.     We were asked to see the patient in consultation by Dr. Choi for evaluation of melena and hematemesis.    CC: severe  hypoglycemia/shock     HPI:  Jon Toribio is a 62 year old male with past medical history significant for substance abuse, depression, alcohol withdrawal presenting to the hospital with severe hypoglycemia and shock state. Patient not able to provide history, obtained from EHR. Patient was found unresponsive by family 9/24 after binge dri[JE1.1]nking on vodka over the past 5 days approximately. On EMS arrival patient had blood glucose 39 and in shock with profound lactic acidosis. He reportedly had brinda melena and coffee ground material suctioned from his stomach in the ER. He was placed on octreotide, Protonix drips and ceftriaxone. On review of prior imaging, CT noted severe fatty infiltration of the liver but no clear evidence of liver cirrhosis or liver lesions, or varices. On admission, HGB noted to be 6 with platelets low 144. INR 2.64. Total bilirubin 8.7. Alk phos 195, normal ALT 46, elevated , and elevated creatinine 2.63. Lactic acid 16. He has low grade fever since admission. I do not see a prior history of EGD. No NSAIDs, diuretics, or PPIs listed on PTA meds. He is currently requiring pressor support and is intubated for airway protection. He has received ~4 units of blood. HGB improved to 8.8, now trending down to 7.5. No signs of ongoing bleeding since admission to ICU.     CT chest/abd/pelvis (noncontrast) noted possible diffuse bowel thickening although difficult to assess with no contrast.[JE1.2]        PAST MEDICAL HISTORY:[JE1.1]  Patient Active Problem List    Diagnosis Date Noted     GI hemorrhage 09/24/2017     Priority: Medium     Hyperlipidemia      Priority: Medium     Problem list name updated by automated process. Provider to review[JE1.3]            ROS: A comprehensive ten point review of systems was negative aside from those in mentioned in the HPI.       MEDICATIONS:[JE1.1]   No current outpatient prescriptions on file.[JE1.3]        ALLERGIES:[JE1.1] No Known  Allergies[JE1.3]     SOCIAL HISTORY:[JE1.1]  Social History   Substance Use Topics     Smoking status: Current Every Day Smoker     Packs/day: 1.00     Types: Cigarettes     Smokeless tobacco: Not on file      Comment: 1/2 pack daily     Alcohol use No      Comment: binge drinking for past 2 weeks[JE1.3]        FAMILY HISTORY:[JE1.1]  Family History   Problem Relation Age of Onset     HEART DISEASE Father      Neurologic Disorder Mother      dementia     Dementia Mother      CANCER Brother      throat[JE1.3]        PHYSICAL EXAM:[JE1.1]   BP 97/58 (BP Location: Right arm)  Pulse 118  Temp 100.4  F (38  C)  Resp 30  Wt 58 kg (127 lb 12.8 oz)  SpO2 99%[JE1.3]     PHYSICAL EXAM:  General:[JE1.1] intubated, nonresponsive (not sedated)[JE1.2]  SKIN:[JE1.1] +jaundice, +spider angiomas[JE1.2]  HEAD: Normocephalic. No masses, lesions, tenderness or abnormalities  NECK: Neck supple. No adenopathy. Thyroid symmetric, normal size.  EYES: No scleral icterus  ENT: ENT exam normal, no neck nodes or sinus tenderness  RESPIRATORY: negative, Good diaphragmatic excursion. Lungs clear  CARDIOVASCULAR: negative, PMI normal. No lifts, heaves, or thrills. RRR. No murmurs, clicks gallops or rub  GASTROINTESTINAL: +BS, soft, NT, ND, no HSM, no masses/guarding/rebound  JOINT/EXTREMITIES: extremities normal- no gross deformities noted, and normal muscle tone  NEURO:[JE1.1] minimally responsive[JE1.2]  LYMPH: No anterior cervical, posterior cervical, or supraclavicular adenopathy     LABS:  I reviewed the patient's new clinical lab test results.[JE1.1]   Recent Labs   Lab Test  09/25/17   0625  09/25/17   0600  09/24/17   2330  09/24/17   2230   09/24/17   1716   WBC   --   8.9   --   10.6   --   15.6*   HGB   --   7.5*  7.7*  8.0*   < >  6.0*   MCV   --   96   --   97   --   105*   PLT   --   91*   --   85*   --   144*   INR  2.00*   --    --   2.10*   --   2.64*    < > = values in this interval not displayed.     Recent Labs   Lab Test   09/25/17   0600  09/24/17   2230  09/24/17   1906   NA  127*  124*  123*   POTASSIUM  4.3  4.9  4.9   CHLORIDE  95  89*  88*   CO2  22  18*  17*   BUN  42*  40*  42*   ANIONGAP  10  17*  18*   HILTON  5.7*  5.9*  5.7*     Recent Labs   Lab Test  09/25/17   0600  09/24/17   2141  09/24/17   1716   ALBUMIN  1.8*   --   1.8*   BILITOTAL  8.3*   --   8.7*   ALT  42   --   46   AST  249*   --   316*   ALKPHOS  159*   --   195*   PROTEIN   --   30*   --[JE1.3]         IMAGING  I personally reviewed the patient's new imaging results.[JE1.1]    CT CHEST, ABDOMEN, PELVIS WITHOUT CONTRAST 9/24/2017 6:45 PM      HISTORY: Unresponsive.     Radiation dose for this scan was reduced using automated exposure  control, adjustment of the mA and/or kV according to patient size, or  iterative reconstruction technique.     FINDINGS:   Chest: Endotracheal tube tip is approximately 4 cm from the ailyn  midway between the clavicles and ailyn. Nasogastric tube extends into  the stomach. The lungs are grossly clear with only mild posterior  dependent atelectasis at the lung bases. No pleural effusion or  pneumothorax is demonstrated. The mediastinum and josselyn are grossly  unremarkable for the unenhanced technique.     Abdomen/pelvis: There is marked hepatic fatty infiltration. Contents  of the gallbladder are relatively radiodense measuring approximately  30 HU. Mild to moderate peritoneal fluid is noted within the pelvis,  paracolic gutters, and adjacent to the spleen. No renal stones are  demonstrated. There is no hydronephrosis. The bowel is not well  evaluated without contrast enhancement. Diffuse small bowel wall  thickening and colonic wall thickening may be present. Sigmoid  diverticulosis is also noted. A Street catheter is present within the  urinary bladder which is relatively collapsed.         IMPRESSION:  1. Nonspecific peritoneal fluid within the abdomen and pelvis.  2. Evaluation of the bowel is suboptimal due to lack of  contrast  enhancement. Diffuse bowel wall thickening may be present. There is no  definitive evidence of bowel obstruction. No free peritoneal air.  3. Severe hepatic fatty infiltration.  4. Bilateral posterior dependent pulmonary mild atelectasis.[JE1.2]     CONSULTATION ASSESSMENT AND PLAN:[JE1.1]    62 year old male with history of alcohol abuse presenting to the hospital in hypoglycemic shock in setting of binge drinking episode with evidence of brinda melena and concern for hematemesis in the ER with noted anemia, HGB 6, and acute renal insufficiency. He has required intubation and pressor support. Nurse reports he received 6 L of fluid. Currently, appears to have stabilized without ongoing signs of brinda GIB. I do not see any prior evidence of liver cirrhosis and CT imaging does not indicate this although he does have biochemical evidence of chronic liver disease with elevated INR and thrombocytopenia. He has noted ETOH hepatitis. Steroids contraindicated in setting of GIB. Will plan on EGD in ICU to further assess. Continue PPI, octreotide drips. Serial HGB and transfuse prn. Continue ceftriaxone for SBP prophylaxis x 7 days. Monitor LFTs, creatinine, INR. I will review with Dr. Ospina when able.[JE1.2]     Thank you for asking us to participate in the care of this patient.    Kika Bullard PA-C  [facility][JE1.1]     Revision History        User Key Date/Time User Provider Type Action    > JE1.2 9/25/2017 10:18 AM Kika Bullard PA-C Physician Assistant - VANDANA Sign     JE1.3 9/25/2017  9:35 AM Kika Bullard PA-C Physician Assistant Sterling C      JE1.1 9/25/2017  9:34 AM Kika Bullard PA-C Physician Assistant - VANDANA             Consults by Palmira Saldaña PA-C at 9/25/2017  8:16 AM     Author:  Palmira Saldaña PA-C Service:  Colorectal Surgery Author Type:  Physician Assistant - C    Filed:  9/25/2017  9:41 AM Date of Service:  9/25/2017  8:16 AM Creation Time:  9/25/2017   8:16 AM    Status:  Attested :  Palmira Saldaña PA-C (Physician Assistant - C)    Cosigner:  Aviva Looney MD at 9/25/2017 12:31 PM         Consult Orders:    1. Colorectal Surgery IP Consult: Patient to be seen: Routine - within 24 hours; critically ill patient w/ possible colitis; Consultant may enter orders: Yes [[id number]] ordered by Charlie Choi MD at 09/24/17 2021           Attestation signed by Aviva Looney MD at 9/25/2017 12:31 PM        Physician Attestation   IAviva MD, saw and evaluated Jon Toribio as part of a shared visit.  I have reviewed and discussed with the advanced practice provider their history, physical and plan.    I personally reviewed the vital signs, medications, labs and imaging.    My key history or physical exam findings:   Critically ill man found down, with likely upper GI bleed, severe metabolic derangements and low albumin. Non-con CT with diffuse bowel wall thickening, ascites, dependent edema.    Intubated, not responding, abd soft, round      Key management decisions made by me:   CT scan of abdomen without contrast is limited but no focal surgical issue and abnormalities likely related to global process with the liver and alcohol abuse as evidenced by the low albumin, glucose, sodium and platelets and elevated LFT's and INR.    If clinical course indicates a bowel source of his issues, could consider a CT scan with IV contrast however, with his marginal renal function at this time and other explanations, I would recommend supportive care. No plan for intervention from CRS.      Aviva Looney MD  Date of Service (when I saw the patient): 09/25/17                               [facility]  Colon and Rectal Surgery Consult Note  Name: Jon Toribio    MRN: [id number]  YOB: 1955    Age: 62 year old  Date of admission: 9/24/2017  Primary care provider: No primary care provider on file.      Requesting Physician:  Dr. Cohi  Reason for consult:[KB1.1]  Critically ill patient with possible colitis[KB1.2]           History of Present Illness:   Jon Toribio is a 62 year old male, seen at the request of Dr. Choi, presents with severe hypoglycemia and shock state as he was found unresponsive by his family after 5 days of heavy alcohol intake. PMHx substance abuse, depression, alcohol withdrawal.[KB1.1] Per chart review, pt had been binging, greater than baseline, for the past 5 days. He was found unresponsive and EMS was called.[KB1.2]      His blood glucose initially on the field was 39. He was also found to be hypotensive. Pt had elevated lactic acid to 16, now improved to 3.4. He is s/p 2 units RBC, 2 units of FFP. Hgb stable at 7.5. No leukocytosis. He had arterial line placed and is responding to pressors.[KB1.1] INR remains elevated at 2.0 s/p reversal.[KB1.2]   CAP CT scan with nonspecific peritoneal fluid within the abdomen and pelvis and diffuse bowel wall thickening. No definitive evidence of bowel obstruction or free peritoneal air. Severe hepatic fatty infiltration. Bilateral posterior dependent pulmonary mild atelectasis.[KB1.3]     Colonoscopy History:  10/12/2006 with single transverse and two rectal polyps removed.     Surgical History:[KB1.1] None per chart review[KB1.2]            Past Medical History:     Past Medical History:   Diagnosis Date     Other and unspecified hyperlipidemia      Substance abuse              Past Surgical History:     Past Surgical History:   Procedure Laterality Date     C NONSPECIFIC PROCEDURE      Vasectomy               Social History:     Social History   Substance Use Topics     Smoking status: Current Every Day Smoker     Packs/day: 1.00     Types: Cigarettes     Smokeless tobacco: Not on file      Comment: 1/2 pack daily     Alcohol use No      Comment: binge drinking for past 2 weeks             Family History:     Family History    Problem Relation Age of Onset     HEART DISEASE Father      Neurologic Disorder Mother      dementia     Dementia Mother      CANCER Brother      throat             Allergies:   No Known Allergies          Medications:       cefTRIAXone  2 g Intravenous Q24H     IV infusion builder WITH LARGE additive list   Intravenous Q24H     insulin aspart  1-4 Units Subcutaneous Q4H             Review of Systems:   A comprehensive greater than 10 system review of systems was carried out.  Pertinent positives and negatives are noted above.  Otherwise negative for contributory info.            Physical Exam:     Blood pressure (!) 76/50, pulse 118, temperature 100.6  F (38.1  C), resp. rate 14, weight 58 kg (127 lb 12.8 oz), SpO2 100 %.    Intake/Output Summary (Last 24 hours) at 09/25/17 0816  Last data filed at 09/25/17 0600   Gross per 24 hour   Intake          2736.79 ml   Output              175 ml   Net          2561.79 ml     EXAM:  GEN:[KB1.1]  Intubated, obtunded, does not respond to questions or commands.[KB1.2]  appears[KB1.1] older than[KB1.2] stated age  PULM:[KB1.1] Intubated[KB1.2]  ABD: Soft,[KB1.1] non[KB1.2] tender,[KB1.1] mildly[KB1.2] distended.[KB1.1] no[KB1.2] rebound or guarding   RECTAL: Rectal exam was[KB1.1] deferred[KB1.2]  NEURO: CN II-XII grossly intact  MSK: extremeties with no clubbing, cyanosis or edema;   EXT/SKIN: inspection reveals no rashes, lesions or ulcers, normal coloring         Data Reviewed:[KB1.1]     Results for orders placed or performed during the hospital encounter of 09/24/17   CT Head w/o Contrast    Narrative    CT SCAN OF THE HEAD WITHOUT CONTRAST   9/24/2017 6:44 PM     HISTORY: Unresponsive.    TECHNIQUE:  Axial images of the head and coronal reformations without  IV contrast material.  Radiation dose for this scan was reduced using  automated exposure control, adjustment of the mA and/or kV according  to patient size, or iterative reconstruction technique.    COMPARISON:  None.    FINDINGS: There is some mild cerebral atrophy. Minimal patchy white  matter changes are present in both hemispheres without mass effect.  There is no evidence for intracranial hemorrhage, mass effect, acute  infarct, or skull fracture. Visualized paranasal sinuses and mastoid  air cells are clear. Vascular calcifications noted.      Impression    IMPRESSION: Chronic changes. No evidence for intracranial hemorrhage  or any acute process.    CHERRY MADDOX MD   XR Chest Port 1 View    Narrative    CHEST PORTABLE ONE VIEW   9/24/2017 6:01 PM     HISTORY: Unresponsive.    COMPARISON: None.      Impression    IMPRESSION: ET tube and nasogastric tube in good position. Heart size  and pulmonary vascular normal. Lungs are clear.    CHERRY MADDOX MD   Cervical spine CT w/o contrast    Narrative    CT CERVICAL SPINE WITHOUT CONTRAST   9/24/2017 6:44 PM     HISTORY: Unresponsive.     TECHNIQUE: Axial images of the cervical spine were obtained without  intravenous contrast. Multiplanar reformations were performed.  Radiation dose for this scan was reduced using automated exposure  control, adjustment of the mA and/or kV according to patient size, or  iterative reconstruction technique.     COMPARISON: None.    FINDINGS: Sagittal reconstructions demonstrate minimal degenerative  spondylolisthesis of C4 on C5 measuring approximately 2 to 3 mm. Disc  space narrowing is present at C5-C6. There is no evidence for any  acute fracture.    C1-C2: Degenerative changes are present. There is no stenosis.    C2-C3: Left-sided uncinate spurring and minimal facet hypertrophy is  present. There is no stenosis.    C3-C4: Moderate left-sided facet hypertrophy and posterior osteophyte  formation is present. There is also some left-sided uncinate spurring.  There is moderate left-sided foraminal stenosis and mild central canal  stenosis.    C4-C5: Uncinate spurring and facet hypertrophy is present on the left  greater than right causing  some moderate left-sided foraminal  stenosis. There is also mild central canal stenosis due to broad-based  disc bulging and some minimal spondylolisthesis.    C5-6: Broad-based posterior osteophyte formation and facet hypertrophy  is present causing moderate central canal stenosis and moderate to  severe bilateral neural foraminal stenosis.    C6-7: Posterior osteophyte formation, disc bulge and facet hypertrophy  is present causing mild to moderate bilateral neural foraminal  stenosis and mild central canal stenosis.    C7-T1: Normal.      Impression    IMPRESSION:  1. Grade 1 degenerative spondylolisthesis at C4-C5.  2. Multilevel degenerative disc and facet disease most advanced at  C5-C6 where there is moderate central canal stenosis and moderate to  severe bilateral neural foraminal stenosis.  3. No evidence for fracture.    CHERRY MADDOX MD   CT Chest Abdomen Pelvis w/o Contrast    Narrative    CT CHEST, ABDOMEN, PELVIS WITHOUT CONTRAST 9/24/2017 6:45 PM     HISTORY: Unresponsive.    Radiation dose for this scan was reduced using automated exposure  control, adjustment of the mA and/or kV according to patient size, or  iterative reconstruction technique.    FINDINGS:   Chest: Endotracheal tube tip is approximately 4 cm from the ailyn  midway between the clavicles and ailyn. Nasogastric tube extends into  the stomach. The lungs are grossly clear with only mild posterior  dependent atelectasis at the lung bases. No pleural effusion or  pneumothorax is demonstrated. The mediastinum and josselyn are grossly  unremarkable for the unenhanced technique.    Abdomen/pelvis: There is marked hepatic fatty infiltration. Contents  of the gallbladder are relatively radiodense measuring approximately  30 HU. Mild to moderate peritoneal fluid is noted within the pelvis,  paracolic gutters, and adjacent to the spleen. No renal stones are  demonstrated. There is no hydronephrosis. The bowel is not well  evaluated without contrast  enhancement. Diffuse small bowel wall  thickening and colonic wall thickening may be present. Sigmoid  diverticulosis is also noted. A Street catheter is present within the  urinary bladder which is relatively collapsed.      Impression    IMPRESSION:  1. Nonspecific peritoneal fluid within the abdomen and pelvis.  2. Evaluation of the bowel is suboptimal due to lack of contrast  enhancement. Diffuse bowel wall thickening may be present. There is no  definitive evidence of bowel obstruction. No free peritoneal air.  3. Severe hepatic fatty infiltration.  4. Bilateral posterior dependent pulmonary mild atelectasis.    RAMIN GUILLORY MD         Recent Labs  Lab 09/25/17  0600 09/24/17  2330 09/24/17 2230 09/24/17  1716   WBC 8.9  --  10.6  --  15.6*   HGB 7.5* 7.7* 8.0*  < > 6.0*   HCT 22.4*  --  23.5*  --  18.4*   MCV 96  --  97  --  105*   PLT 91*  --  85*  --  144*   < > = values in this interval not displayed.    Recent Labs  Lab 09/25/17 0600 09/24/17 2230 09/24/17  1906 09/24/17  1716   * 124* 123*  < > 120*   POTASSIUM 4.3 4.9 4.9  < > 5.5*   CHLORIDE 95 89* 88*  < > 81*   CO2 22 18* 17*  --  16*   ANIONGAP 10 17* 18*  < > 23*   * 89 108*  < > 122*   BUN 42* 40* 42*  < > 44*   CR 1.68* 2.15* 2.34*  --  2.63*   GFRESTIMATED 42* 31* 28*  < > 25*   GFRESTBLACK 50* 38* 34*  < > 30*   HILTON 5.7* 5.9* 5.7*  --  6.0*   MAG  --  1.7  --   --  2.1   PHOS  --  3.0  --   --   --    PROTTOTAL 4.5*  --   --   --  4.7*   ALBUMIN 1.8*  --   --   --  1.8*   BILITOTAL 8.3*  --   --   --  8.7*   ALKPHOS 159*  --   --   --  195*   *  --   --   --  316*   ALT 42  --   --   --  46   < > = values in this interval not displayed.    Recent Labs  Lab 09/25/17  0625 09/24/17 2230 09/24/17  1716   INR 2.00* 2.10* 2.64*       Recent Labs  Lab 09/25/17  0600 09/24/17 2230 09/24/17  1800   LACT 3.4* 8.7* 12.8*       Recent Labs  Lab 09/24/17  2141   COLOR Tonya   APPEARANCE Cloudy   URINEGLC 50*   URINEBILI Moderate*    URINEKETONE Negative   SG 1.018   UBLD Small*   URINEPH 5.0   PROTEIN 30*   NITRITE Negative   LEUKEST Negative   RBCU 6*   WBCU 11*[KB1.4]         Assessment and Plan:   Jon Toribio is a 62 year old male presents[KB1.1] after he was found unresponsive by family and EMS called, found to have[KB1.2] severe hypoglycemia and[KB1.1] in[KB1.2] shock state after 5 days of heavy alcohol intake. PMHx substance abuse, depression, alcohol withdrawal.     His blood glucose initially on the field was 39. He was also found to be hypotensive. Pt had elevated lactic acid to 16, now improved to 3.4. He is s/p 2 units RBC, 2 units of FFP. Hgb stable at 7.5. No leukocytosis. He had arterial line placed and is responding to pressors.[KB1.1] INR remains elevated at 2.0 s/p reversal.[KB1.2] CAP CT scan with nonspecific peritoneal fluid within the abdomen and pelvis and diffuse bowel wall thickening. No definitive evidence of bowel obstruction or free peritoneal air. Severe hepatic fatty infiltration. Bilateral posterior dependent pulmonary mild atelectasis[KB1.3]. He continues with low grade fever and tachycardia to 100-110s.[KB1.2]    Plan:  1. Admit to ICU  2. Surgery: No emergent surgery indicated.[KB1.1] Diffuse small bowel thickening on imaging most likely related to global hypoproteinemia and general anasarca.[KB1.2] Appreciate intensivist management of medical comorbidities.[KB1.1] Pt poor surgical candidate in setting of hypercoagulable state, electrolyte disturbance, multiple comorbidities. Will continue to follow peripherally and be available for questions.[KB1.2]   3. Diet: NPO  4. IV Fluids: As ordered  5. Antibiotics:  Not indicated from CRS standpoint  6. Medications:[KB1.1] Per intensivist[KB1.2]  7. I&O s:  strict I&O s  8. Labs:   - Reviewed:[KB1.1] by myself[KB1.2]  9. Imaging:   - [KB1.1] Aure[KB1.2] and myself have personally viewed: CT abd/pelvis  10. DVT prophylaxis: SCD s  11. This plan has been  "discussed with [KB1.1] Aure[KB1.2]    Patient specific identified risk factors considered as part of today s evaluation include:[KB1.1] substance abuse, global hypoproteinemia, depression, electrolyte disturbance.[KB1.2]     Additional history obtained from[KB1.1] chart review, nurse[KB1.2].  Time spent on consultation:[KB1.1] 30[KB1.2]minutes, greater than 50 percent of the total encounter time is spent in counseling and/or coordination of care          Palmira Saldaña PA-C  [facility]  Phone:  [phone][KB1.1]        Revision History        User Key Date/Time User Provider Type Action    > KB1.2 2017  9:41 AM Palmira Saldaña PA-C Physician Assistant - VANDANA Sign     KB1.3 2017  8:24 AM Palmira Saldaña PA-C Physician Assistant - C      KB1.4 2017  8:22 AM Palmira Saldaña PA-C Physician Assistant - C      KB1.1 2017  8:16 AM Palmira Saldaña PA-C Physician Assistant - C                      Progress Notes - Physician (Notes from 10/01/17 through 10/04/17)      Progress Notes by Theo Gonzalez at 10/4/2017  2:13 PM     Author:  Theo Gonzalez Service:  Spiritual Health Author Type:      Filed:  10/4/2017  2:16 PM Date of Service:  10/4/2017  2:13 PM Creation Time:  10/4/2017  2:13 PM    Status:  Signed :  Theo Gonzalez ()         SPIRITUAL HEALTH SERVICES Progress Note  [facility] Med/Surg 5th floor     visit for follow up. Met with Robin's three nieces who are visiting him. They briefly shared that Robin is going home with hospice this afternoon. They reflected on the challenges Robin has faced and shared that \"he is a good man.\" They shared that they have told Robin that his brother, who is , is \"coming to take him home.\" They also note that Robin's parents are  and will be able to see them as well. Robin slept through visit and appears comfortable. No follow planned as pt to discharge later today.  remains available per pt/family need or request.     Theo HENDERSON" KARTIK Gonzalez  Staff    Pager #[phone][DM1.1]        Revision History        User Key Date/Time User Provider Type Action    > DM1.1 10/4/2017  2:16 PM Theo Gonzalez Sign            Progress Notes by Alesia Claros RN at 10/4/2017 11:54 AM     Author:  Alesia Claros RN Service:  (none) Author Type:      Filed:  10/4/2017 11:55 AM Date of Service:  10/4/2017 11:54 AM Creation Time:  10/4/2017 11:54 AM    Status:  Signed :  Alesia Claros RN ()         Pt has signed on to [facility].  They will fill medications and get DME.  DME will arrive before 1700 to his home.  [facility] transport stretcher is set up for 1700 . Pt has around 6 steps and is currently a lift. They are aware of private cost of stretcher.  DC orders need to be faxed to [phone].  Updated pt/sister and bedside RN.  Kathryn RN CTS 1701[RB1.1]       Revision History        User Key Date/Time User Provider Type Action    > RB1.1 10/4/2017 11:55 AM Alesia Claros RN Case Manager Sign            Progress Notes by Kym Chcias MD at 10/3/2017 10:16 AM     Author:  Kym Chicas MD Service:  Hospitalist Author Type:  Physician    Filed:  10/3/2017  3:12 PM Date of Service:  10/3/2017 10:16 AM Creation Time:  10/3/2017 10:16 AM    Status:  Signed :  Kym Chicas MD (Physician)                        [facility]  Hospitalist Progress Note  Name: Jon Toribio    MRN: [id number]  YOB: 1955    Age: 62 year old  Date of admission: 9/24/2017  Primary care provider: No primary care provider on file.      Reason for Stay (Diagnosis): Acute encephalopathy/alcohol dependence and withdrawal/acute blood loss anemia         Assessment and Plan:      Summary of Stay:  Jon Toribio is a 62 year old male with hx of etoh abuse with dependence and hx of withdrawal who has been drinking more lately and was found confused by a  family member.  He then went unresponsive so the son started CPR and called EMS.  BG 39 for EMS.  He was in shock with lactic acidosis of 16 in the ED.  He was intubated for airway protection as he was obtunded and admitted to the ICU on 9/24/2017.  He had brinda melena and was put on octreotide gtt, protonix gtt, and ceftriaxone for presumed UGIB an possibility of varices.  GI consulted and he had EGD 9/5 showing severe esophagitis with a superficial esophageal tear.  No NG/OG per GI.  Initial labs show acute hepatitis presumably from etoh so he was started on methylprednisolone for a high DFscore.  Ammonia level was up that resolved with lactulose enemas.  His sedation was weaned and passed PST so extubated morning of 9/28.  Has evidence for severe malnutrition and multiple electrolyte deficiencies that are being replaced.  Diet was advanced to DD2 per SLP but minimal to no p.o intake.  So far no recurrent bleeding.   patient was transferred to the medical floor on 10/1/17.  GI input on that day also appreciated.  Given minimal response to steroids, steroids were discontinued also on 10/1/17.  Overall, poor prognosis.  Patient is not able to participate effectively in palliative care decisions.  Palliative care[SM1.1] team was[SM1.2] consult[SM1.1]ed. Given terminal diagnosis and poor prognosis family opted for[SM1.2] comfort measures.    Problem List/Assessment and Plan:   Acute encephalopathy: obtunded on arrival, possibly from alcohol withdrawal and hepatic encephalopathy, but cannot say for sure as he just received CPR.  Etoh level negative.  Suppose seizure also in differential given heavy etoh use.  Initially did not require sedation despite intubation/mechanical ventilation.  Head CT without acute findings.  Patient was successfully extubated and mentally is improving, but suspect still not at baseline.[SM1.1] He is communicative but talks minimally when prompted[SM1.2]     Shock due to UGI Bleed and  underlying cirrhosis:   now weaned off norepinephrine and vasopressin. Suspect a low BP at baseline from cirrhosis.     Acute blood loss anemia:  Rec'd 2 U PRBCs here.  Hg now stable without evidence for ongoing bleeding.     UGI Bleed:  Melena on admit.  Secondary to severe esophagitis and superficial esophageal tear.  -bid ppi     Acute on chronic alcoholic hepatitis:  Bilirubin 11-12, direct fraction is 9.6 so all likely liver related.  ALT wnl, and AST rending down from 315.  Alk phos only minimally elevated. Liver shows fatty infiltration on CT but ascites is present.  I suspect some degree of cirrhosis.  INR elevated 2.0 range initially, some of which may be nutritional.  DF is > 100 so started methylprednisolone on 9/25/17.  -[facility] consulted, appreciate recs  -repeat Lille score 10/1 demonstrates that the patient is steroid refractory so this was discontinued.     PENNY:  [SM1.1]worsening renal functions[SM1.2]    Alcohol abuse and dependence:  It is suspected he was drinking 1L alcohol per day.    -CIWA with prn ativan, has not needed much now and likely is over withdrawal  -May discontinue CIWA protocol    Ascites: On ceftriaxone for SBP prophylaxis.      Pulmonary secretions:  Small opacity LLL on chest xray 9/27.  At risk for aspiration, still without fevers, WBC rising which is probably steroid effect.  Procal 0.5.  No abx for now.     Low K, mag, PO4, Ca: replacement protocols     Hyponatremia, resolved: 120 on admit now Na 130s and stable.  Will monitor as his diet is advanced.  Suspect some component of his liver disease.  Has 1-2+ LE edema and some ascites.  Currently off IV fluids[SM1.1]  -- will avoid labs as patient is on comfort cares[SM1.2]    Coagulopathy:  INR 2.64 on admit.  Likely combination of liver disease and nutritional deficiency.      Severe protein calorie malnutrition:   -dietician input appreciated  -Limited p.o. intake; given poor prognosis, would not recommend tube feedings at this  "time    Leukocytosis: Likely secondary to steroids.  No signs of infection     DVT Prophylaxis: Pneumatic Compression Devices, no heparin for GI bleed  Code Status:[SM1.1]DNR/ DNI comfort cares only[SM1.2]  FEN: Diet per speech  Discharge Dispo: tbd, PT and OT consulted[SM1.1] H[SM1.2]ospice at discharge  Estimated Disch Date / # of Days until Disch:[SM1.1] possibly tomorrow after hospice    Care plan d/w SW and palliative team[SM1.2]      Interval History (Subjective):      Limited historian secondary to encephalopathy.  Is able to answer no to pain however.         Physical Exam:      Vital signs:  Temp: 96.4  F (35.8  C) Temp src: Oral BP: 94/57   Heart Rate: 110 Resp: 22 SpO2: 91 % O2 Device: None (Room air) Oxygen Delivery: 2 LPM   Weight: 63.3 kg (139 lb 9.6 oz)  Estimated body mass index is 20.65 kg/(m^2) as calculated from the following:    Height as of 9/18/06: 1.791 m (5' 10.5\").    Weight as of 10/3/06: 66.2 kg (146 lb).  I/O last 3 completed shifts:  In: 341.8 [P.O.:100; I.V.:241.8]  Out: 100 [Urine:100]  Vitals:    09/28/17 0000 09/29/17 0000 10/01/17 0603 10/02/17 0519   Weight: 65.6 kg (144 lb 10 oz) 62.2 kg (137 lb 2 oz) 63 kg (139 lb) 64.5 kg (142 lb 3.2 oz)    10/03/17 0500   Weight: 63.3 kg (139 lb 9.6 oz)       Constitutional:[SM1.1] JAUNDICED< WEAK, Pale[SM1.2]Awake, alert, appears frail and malnourished.  Otherwise, no apparent distress   Respiratory: Nl work of breathing. Clear to auscultation bilaterally, no crackles or wheezing   Cardiovascular: Regular rate and rhythm, normal S1 and S2, and no murmur noted   Abdomen: Normal bowel sounds, soft,[SM1.1]significantly[SM1.2]distended abdomen with fluid wave shift.  non-tender   Skin:  jaundiced.  No rashes, no cyanosis, dry to touch   Neuro: CN 2-12 intact, no localizing weakness   Extremities:  2+ edema, normal range of motion   HEENT Normocephalic, atraumatic, normal nasal turbinates; positive scleral icterus    Neck Supple; nl inspection; " trachea midline; no thryomegaly   Psychiatric:  awake and alert.  Flat affect          Medications:      All current medications were reviewed with changes reflected in problem list.         Data:      All new lab and imaging data was reviewed.   Labs:    Recent Labs  Lab 10/01/17  1400 10/01/17  0903 10/01/17  0855   CULT 10,000 to 50,000 colonies/mLGram positive cocci*  Culture in progress No growth after 2 days No growth after 2 days       Recent Labs  Lab 10/03/17  0630 10/02/17  0641 10/01/17  0720   WBC 28.1* 34.2* 23.7*   HGB 8.4* 8.8* 9.2*   HCT 25.1* 26.4* 28.1*   MCV 96 98 100    194 154       Recent Labs  Lab 10/03/17  0630 10/02/17  0641 10/01/17  0720 09/30/17  1402 09/29/17  0545   * 132* 133 134 135   POTASSIUM 4.8 4.8 4.6 4.2 4.1   CHLORIDE 99 99 102 103 103   CO2 22 23 22 22 25   ANIONGAP 10 10 9 9 7   GLC 67* 77 72 122* 112*   BUN 52* 42* 29 20 18   CR 2.03* 1.59* 1.11 0.67 0.44*   GFRESTIMATED 33* 44* 67 >90 >90   GFRESTBLACK 41* 54* 81 >90 >90   HILTON 7.3* 7.9* 7.9* 7.6* 7.1*   MAG 1.9 2.0 1.8 1.8  --    PHOS  --   --  3.0 2.5 2.3*   PROTTOTAL 4.7* 4.9* 4.7* 4.5*  --    ALBUMIN 1.5* 1.6* 1.6* 1.5*  --    BILITOTAL 23.3* 22.8* 20.9* 19.5*  --    ALKPHOS 454* 421* 386* 382*  --    * 112* 148* 169*  --    ALT 84* 88* 100* 100*  --        Recent Labs  Lab 10/02/17  0641 10/01/17  0720   INR 1.58* 2.01*      Imaging:   No results found for this or any previous visit (from the past 24 hour(s)).[SM1.1]             Revision History        User Key Date/Time User Provider Type Action    > SM1.2 10/3/2017  3:12 PM Kym Chicas MD Physician Sign     SM1.1 10/3/2017 10:16 AM Kym Chicas MD Physician             Progress Notes by Abeba Gilbert APRN CNS at 10/3/2017  9:48 AM     Author:  Abeba Gilbert APRN CNS Service:  Palliative Author Type:  Clinical Nurse Specialist    Filed:  10/3/2017  2:56 PM Date of Service:  10/3/2017  9:48 AM Creation Time:   "10/3/2017  9:48 AM    Status:  Addendum :  Abeba Gilbert APRN CNS (Clinical Nurse Specialist)         [facility]  Palliative Care Progress Note  Text Page[AK1.1]       0715 Addendum:  Spoke with [facility] Nurse Shabnam [phone]  [facility] prefers to fill hospice meds through [facility] and will order from the discharge orders when they come to enroll the patient.  They do not need the hard script for controlled substances as they use the \"emergency fill\" policy for hospice.    DC orders for hospice meds are complete in EPIC.[AK1.2]    Assessment & Plan[AK1.3]   Jno Toribio is a 62 year old male who was admitted on 9/24/2017. I was asked to see the patient for goals of care and support in decision making.     Recommendations:  1.Pain,more generalized today- r/t ascites? -Dilaudid PRN.  2. Nutrition- Comfort eating.  3. Anxiety/restlessness- Pain control as above.  Haldol and ativan (reduced dose in hepatic/renal failure ordered PRN.  Goal of Care:DNR/DNI, comfort care, planning for DC with hospice support.      Disease Process/es & Symptoms:  Jon Toribio is a 62 year old patient admitted with symptoms of unresponsiveness and hypoglycemia from home in setting of alcoholism and binge.  He initially was given CPR by son at instruction of , but was breathing and had a pulse when EMS arrived. He was initially treated for shock thought related to UGI bleed and cirrhosis, he is now weaned off pressors and has no further evidence of bleed which is thought related to severe esophagitis and superficial esophageal tear.  He was obtunded on admission and has cleared significantly but remains slow to respond and unable to answer more than simple yes/no questions currently.  His most significant concern is acute on chronic alcoholic hepatitis which has worsened despite course of steroids leaving him an overall poor prognosis.  Noted discussion with " "patient son Marlen and friend on 10/1.  This is in the setting of depression and alcoholism.     The following symptoms are noted by patient as concerning to his quality of life.  Pain - noted epigastric, reported B LE pain to nurse.  Anxiety/ restlessness- I want to go home.     Psychosocial/Spiritual Needs:   is following, appreciate support from Theo Gonzalez MDiv.    Eugenie Ramirez involved in planning for comfort care transition home.  Appreciate support.     Decision-Making & Goals of Care:  Discussion/counseling today about goals of care/decisions:   Patient alert enough to communicate better today. He tells me his biggest concern is getting home and that his brother () will come and pick him up.  He tells me he remembers the conversations about his prognosis over the past few days but is not able to verbalize details.  I asked him if he was to get worse here does he want us to bring him back to the ICU and put him back on life support to try and save his life.  He states, \"no, I just want to go home.  I spoke with sherice via the phone who tells me he has spoken with his brother and they have agreed to just take patient home.  Kelsea will care for him while they work.  HE agrees that patient should be DNR/DNI and focus all treatment of comfort.  They will be in shortly for further conversation and planning.  10/2/2017Spoke with son Sherice via phone.  Conversation was brief as he had to return to work.  He noted hearing bad news yesterday and knowledge that his father had to \"complete his last will and testament.\"  He states patient's ex-girlfriend should be allowed to have information and patient's sister will be arriving from [place] later today.  As patient is having difficulty communicating well and has unclear understanding of current situation, requested family consider when they would be available for further planning of care.  Will also discuss with sister if able when she " "arrives.  1630 Addendum:  Met with Son Sherice in presence of patient who is more alert but still does not participate and Sister Kelsea.  Reviewed below information about his understanding of prognosis which he appears to understand well, concept of comfort care, nutrition and code status in setting of comfort care with Sherice.  He appears distressed about making any decisions about patient but then states, \" This is not what I want, but I know what my dad would want.\"  He states he needs to review things with his brother.  We plan to revisit via phone tomorrow.  He tells me he has support also in his mother who is a teacher (patient's ex-wife) and we discussed involvement of  for assistance setting up care outside hospital when appropriate and as help to address and financial concerns.    1200 Addendum: Met with patient's sister Kelsea with patient who was unable to stay awake to participate well.  Reviewed course of illness with Kelsea and notes from yesterday from Dr. Jefferson about prognosis.  Kelsea feels strongly that patient would not want \"heroic measures\" and would likely drink again if he could.  She feels he would be miserable in any type of rehab program or care center. We discussed option of comfort care focus and hospice care.  We also discussed option of artificial feeding but unclear benefit.  Kelsea feels it would be a risk with known esophageal bleed and patient would pull it out anyway.  She does not think patient would want to be coded.  She would like to discuss the above with patient's son Sherice when he arrives this afternoon.  She would be open to caring for patient at home with hospice care.  We also spent some time reflecting on their life together as siblings growing up in [facility]. She is greiving the loss of her brother but rightly attempting to consider planning for his care with what she thinks he would want in mind.       Patient has decision-making capacity Questionable  Patient has " "no known legal document designating a decision maker. Per policy next of kin is the designated decision maker. See System Informed Consent policy for guidance.  **Patient's son refers to a possible legal document from when patient was  but assuming it would no longer be valid after divorce.  I have asked him to bring it in for review, he has not brought this in and I wonder if he meant the \"marriage\"  Was the legal document.  1st- Adult Children- Sherice and Brian Toribio  2nd - Parents   3rd- Adult siblings- Kelsea is older sibling and involved in hospitalization, Brother Tres is , another half-sister is reported uninvolved.     Patient has a completed health care directive available in the chart (Y/N): N  There is no POLST (Physician orders for life-sustaining treatment) form on file for this patient  Code Status:                    DNR/DNI- see discussion above.      Findings & plan of care discussed with: MD, Nursing and SW  Follow-up plan from palliative team: Will attempt to meet with Sister Kelsea when she arrives.  Thank you for involving us in the patient's care.[AK1.1]     Abeba Gilbert[AK1.3] APRN, CNS  Pain Management and Palliative Care  [facility]  Pgr: [phone][AK1.1]    Time Spent on this Encounter[AK1.3]   I spent  20 minutes in assessment of the patient and discussion with the patient and family. Another 20 minutes in review of chart, documentation and discussion with the health care team.[AK1.1]    Interval History[AK1.3]   Patient is more alert today and restless.    Continues to c/o abdominal pain.  Discussion as above to focus on comfort care.[AK1.1]    Review of Systems[AK1.3]    C: NEGATIVE for fever, chills  E/M: NEGATIVE for ear, mouth and throat problems  R: NEGATIVE for significant cough or SOB    Palliative Symptom Review (0=no symptom/no concern, 1=mild, 2=moderate, 3=severe):      Pain: \"7\"      Fatigue: YES      Nausea: 0-none      Constipation: " 0-none      Diarrhea: 0-none      Depressive Symptoms: 0-none      Anxiety: 2-moderate,I just want to get home      Drowsiness: 0-none      Poor Appetite: 2-moderate      Shortness of Breath: 0-none      Insomnia: 1-mild[AK1.1]    Physical Exam[AK1.3]   Temp:  [96.4  F (35.8  C)-96.8  F (36  C)] 96.4  F (35.8  C)  Heart Rate:  [] 110  Resp:  [18-22] 22  BP: (93-94)/(51-57) 94/57  SpO2:  [89 %-91 %] 91 %[AK1.4]  139 lbs 9.6 oz[AK1.3]  GEN:  Alert, unclear orientation, thinks his  brother can come pick him up, appears restless.  HEENT:  Normocephalic/atraumatic,scleral icterus, no nasal discharge, mouth moist.  CV:  RRR, S1, S2; no murmurs or other irregularities noted.  +3 DP/PT pulses bilatererally;edema BLE up to chest  RESP:  Clear to auscultation bilaterally without rales/rhonchi/wheezing/retractions.  Symmetric chest rise on inhalation noted.  Normal respiratory effort.  ABD:  Rounded, soft, non-tender/non-distended.  +BS  EXT:  Edema & pulses as noted above.  CMS intact x 4.      SKIN:  Dry to touch, no exanthems noted in the visualized areas.  Jaundiced  NEURO: generalized weakness, no focal deficits noted.  PAIN BEHAVIOR: Cooperative  Psych:  restless, cooperative, slow to respond.  Confused conversation at times.[AK1.1]    Medications[AK1.3]     - MEDICATION INSTRUCTIONS -       - MEDICATION INSTRUCTIONS -       - MEDICATION INSTRUCTIONS -[AK1.5]         LORazepam  0.25-0.5 mg Intravenous Q6H    Or     LORazepam  0.25-0.5 mg Oral Q6H    Or     LORazepam  0.25-0.5 mg Sublingual Q6H     senna-docusate  1 tablet Oral BID     cefTRIAXone  1 g Intravenous Q24H     pantoprazole  40 mg Oral BID AC     sodium chloride (PF)  3 mL Intravenous Q8H       Data[AK1.3]   Results for orders placed or performed during the hospital encounter of 17 (from the past 24 hour(s))   Comprehensive metabolic panel   Result Value Ref Range    Sodium 131 (L) 133 - 144 mmol/L    Potassium 4.8 3.4 - 5.3 mmol/L     Chloride 99 94 - 109 mmol/L    Carbon Dioxide 22 20 - 32 mmol/L    Anion Gap 10 3 - 14 mmol/L    Glucose 67 (L) 70 - 99 mg/dL    Urea Nitrogen 52 (H) 7 - 30 mg/dL    Creatinine 2.03 (H) 0.66 - 1.25 mg/dL    GFR Estimate 33 (L) >60 mL/min/1.7m2    GFR Estimate If Black 41 (L) >60 mL/min/1.7m2    Calcium 7.3 (L) 8.5 - 10.1 mg/dL    Bilirubin Total 23.3 (HH) 0.2 - 1.3 mg/dL    Albumin 1.5 (L) 3.4 - 5.0 g/dL    Protein Total 4.7 (L) 6.8 - 8.8 g/dL    Alkaline Phosphatase 454 (H) 40 - 150 U/L    ALT 84 (H) 0 - 70 U/L     (H) 0 - 45 U/L   CBC with platelets   Result Value Ref Range    WBC 28.1 (H) 4.0 - 11.0 10e9/L    RBC Count 2.61 (L) 4.4 - 5.9 10e12/L    Hemoglobin 8.4 (L) 13.3 - 17.7 g/dL    Hematocrit 25.1 (L) 40.0 - 53.0 %    MCV 96 78 - 100 fl    MCH 32.2 26.5 - 33.0 pg    MCHC 33.5 31.5 - 36.5 g/dL    RDW 21.4 (H) 10.0 - 15.0 %    Platelet Count 168 150 - 450 10e9/L   Magnesium   Result Value Ref Range    Magnesium 1.9 1.6 - 2.3 mg/dL[AK1.6]          Revision History        User Key Date/Time User Provider Type Action    > AK1.2 10/3/2017  2:56 PM Abeba Gilbert APRN CNS Clinical Nurse Specialist Addend     AK1.6 10/3/2017 10:05 AM Abeba Gilbert APRN CNS Clinical Nurse Specialist Sign     AK1.5 10/3/2017 10:04 AM Abeba Gilbert APRN CNS Clinical Nurse Specialist      AK1.4 10/3/2017 10:03 AM Abeba Gilbert APRN CNS Clinical Nurse Specialist      AK1.3 10/3/2017  9:49 AM Abeba Gilbert APRN CNS Clinical Nurse Specialist      AK1.1 10/3/2017  9:48 AM Abeba Gilbert APRN CNS Clinical Nurse Specialist             Progress Notes by Theo Gonzalez at 10/3/2017  1:18 PM     Author:  Theo Gonzalez Service:  Spiritual Health Author Type:      Filed:  10/3/2017  1:20 PM Date of Service:  10/3/2017  1:18 PM Creation Time:  10/3/2017  1:18 PM    Status:  Signed :  Theo Gonzalez ()         SPIRITUAL HEALTH SERVICES Progress Note  [facility] Med/Surg 5th floor    SH  visit per plan of care. No family present at this time and pt is sleeping. Will continue to follow. If urgent SH needs arise, please page on-call .     KARTIK Strickland.  Staff    Pager #[phone][DM1.1]        Revision History        User Key Date/Time User Provider Type Action    > DM1.1 10/3/2017  1:20 PM Theo Gonzalez Sign            Progress Notes by Giovanna Quintero MD at 10/2/2017  2:19 PM     Author:  Giovanna Quintero MD Service:  Hospitalist Author Type:  Physician    Filed:  10/2/2017  2:37 PM Date of Service:  10/2/2017  2:19 PM Creation Time:  10/2/2017  2:19 PM    Status:  Signed :  Giovanna Quintero MD (Physician)                        [facility]  Hospitalist Progress Note  Name: Jon Toribio    MRN: [id number]  YOB: 1955    Age: 62 year old  Date of admission: 9/24/2017  Primary care provider: No primary care provider on file.      Reason for Stay (Diagnosis): Acute encephalopathy/alcohol dependence and withdrawal/acute blood loss anemia         Assessment and Plan:      Summary of Stay:  Jon Toribio is a 62 year old male with hx of etoh abuse with dependence and hx of withdrawal who has been drinking more lately and was found confused by a family member.  He then went unresponsive so the son started CPR and called EMS.  BG 39 for EMS.  He was in shock with lactic acidosis of 16 in the ED.  He was intubated for airway protection as he was obtunded and admitted to the ICU on 9/24/2017.  He had brinda melena and was put on octreotide gtt, protonix gtt, and ceftriaxone for presumed UGIB an possibility of varices.  GI consulted and he had EGD 9/5 showing severe esophagitis with a superficial esophageal tear.  No NG/OG per GI.  Initial labs show acute hepatitis presumably from etoh so he was started on methylprednisolone for a high DFscore.  Ammonia level was up that resolved with lactulose enemas.  His sedation was weaned and passed PST so extubated  morning of 9/28.  Has evidence for severe malnutrition and multiple electrolyte deficiencies that are being replaced.  Diet was advanced to DD2 per SLP but minimal to no p.o intake.  So far no recurrent bleeding.   patient was transferred to the medical floor on 10/1/17.  GI input on that day also appreciated.  Given minimal response to steroids, steroids were discontinued also on 10/1/17.  Overall, poor prognosis.  Patient is not able to participate effectively in palliative care decisions.  Palliative care consult appreciated and family leaning towards comfort measures.    Problem List/Assessment and Plan:   Acute encephalopathy: obtunded on arrival, possibly from alcohol withdrawal and hepatic encephalopathy, but cannot say for sure as he just received CPR.  Etoh level negative.  Suppose seizure also in differential given heavy etoh use.  Initially did not require sedation despite intubation/mechanical ventilation.  Head CT without acute findings.  Patient was successfully extubated and mentally is improving, but suspect still not at baseline.     Shock due to UGI Bleed and underlying cirrhosis:   now weaned off norepinephrine and vasopressin. Suspect a low BP at baseline from cirrhosis.     Acute blood loss anemia:  Rec'd 2 U PRBCs here.  Hg now stable without evidence for ongoing bleeding.     UGI Bleed:  Melena on admit.  Secondary to severe esophagitis and superficial esophageal tear.  -bid ppi     Acute on chronic alcoholic hepatitis:  Bilirubin 11-12, direct fraction is 9.6 so all likely liver related.  ALT wnl, and AST rending down from 315.  Alk phos only minimally elevated. Liver shows fatty infiltration on CT but ascites is present.  I suspect some degree of cirrhosis.  INR elevated 2.0 range initially, some of which may be nutritional.  DF is > 100 so started methylprednisolone on 9/25/17.  -[facility] consulted, appreciate recs  -repeat Lille score 10/1 demonstrates that the patient is steroid refractory so  this was discontinued.     PENNY:  Looks all prerenal. Creatinine normalized.    Alcohol abuse and dependence:  It is suspected he was drinking 1L alcohol per day.    -CIWA with prn ativan, has not needed much now and likely is over withdrawal  -May discontinue CIWA protocol    Ascites: On ceftriaxone for SBP prophylaxis.      Pulmonary secretions:  Small opacity LLL on chest xray 9/27.  At risk for aspiration, still without fevers, WBC rising which is probably steroid effect.  Procal 0.5.  No abx for now.     Low K, mag, PO4, Ca: replacement protocols     Hyponatremia, resolved: 120 on admit now Na 130s and stable.  Will monitor as his diet is advanced.  Suspect some component of his liver disease.  Has 1-2+ LE edema and some ascites.  Currently off IV fluids  -daily bmp unless patient is transitioned to comfort measures    Coagulopathy:  INR 2.64 on admit.  Likely combination of liver disease and nutritional deficiency.      Severe protein calorie malnutrition:   -dietician input appreciated  -Limited p.o. intake; given poor prognosis, would not recommend tube feedings at this time    Leukocytosis: Likely secondary to steroids.  No signs of infection     DVT Prophylaxis: Pneumatic Compression Devices, no heparin for GI bleed  Code Status: Full Code for now but patient's family is leaning towards comfort measures.  I did discuss the case with the palliative care team.  POA is not well defined but will likely default the son.  Also has a sister as well as an ex-girlfriend whom in the past apparently was his designated POA but cannot be legally now unless there is some clear documentation.  We'll try to continue to work with family to come to a consensus on goal of cares.  FEN: Diet per speech  Discharge Dispo: tbd, PT and OT consulted.  Probable hospice at discharge  Estimated Disch Date / # of Days until Disch: tbd depending on family's decision on goal of cares    Time spent: Greater than 40 minutes      Interval  "History (Subjective):      Limited historian secondary to encephalopathy.  Is able to answer no to pain however.         Physical Exam:      Vital signs:  Temp: 96.1  F (35.6  C) Temp src: Oral BP: 96/62   Heart Rate: 103 Resp: 20 SpO2: 90 % O2 Device: None (Room air) Oxygen Delivery: 2 LPM   Weight: 64.5 kg (142 lb 3.2 oz)  Estimated body mass index is 20.65 kg/(m^2) as calculated from the following:    Height as of 9/18/06: 1.791 m (5' 10.5\").    Weight as of 10/3/06: 66.2 kg (146 lb).[DH1.1]      I/O last 3 completed shifts:  In: 361 [P.O.:25; I.V.:336]  Out: -   Vitals:    09/27/17 0000 09/28/17 0000 09/29/17 0000 10/01/17 0603   Weight: 62.2 kg (137 lb 2 oz) 65.6 kg (144 lb 10 oz) 62.2 kg (137 lb 2 oz) 63 kg (139 lb)    10/02/17 0519   Weight: 64.5 kg (142 lb 3.2 oz)[DH1.2]       Constitutional: Awake, alert, appears frail and malnourished.  Otherwise, no apparent distress   Respiratory: Nl work of breathing. Clear to auscultation bilaterally, no crackles or wheezing   Cardiovascular: Regular rate and rhythm, normal S1 and S2, and no murmur noted   Abdomen: Normal bowel sounds, soft, moderately distended abdomen with fluid wave shift.  non-tender   Skin:  jaundiced.  No rashes, no cyanosis, dry to touch   Neuro: CN 2-12 intact, no localizing weakness   Extremities:  2+ edema, normal range of motion   HEENT Normocephalic, atraumatic, normal nasal turbinates; positive scleral icterus    Neck Supple; nl inspection; trachea midline; no thryomegaly   Psychiatric:  awake and alert.  Flat affect          Medications:      All current medications were reviewed with changes reflected in problem list.         Data:      All new lab and imaging data was reviewed.   Labs:[DH1.1]    Recent Labs  Lab 10/01/17  1400 10/01/17  0903 10/01/17  0855   CULT Culture in progress No growth after 17 hours No growth after 17 hours       Recent Labs  Lab 10/02/17  0641 10/01/17  0720 09/30/17  1513   WBC 34.2* 23.7* 24.4*   HGB 8.8* " "9.2* 8.4*   HCT 26.4* 28.1* 25.1*   MCV 98 100 99    154 150       Recent Labs  Lab 10/02/17  0641 10/01/17  0720 09/30/17  1402 09/29/17  0545   * 133 134 135   POTASSIUM 4.8 4.6 4.2 4.1   CHLORIDE 99 102 103 103   CO2 23 22 22 25   ANIONGAP 10 9 9 7   GLC 77 72 122* 112*   BUN 42* 29 20 18   CR 1.59* 1.11 0.67 0.44*   GFRESTIMATED 44* 67 >90 >90   GFRESTBLACK 54* 81 >90 >90   HILTON 7.9* 7.9* 7.6* 7.1*   MAG 2.0 1.8 1.8  --    PHOS  --  3.0 2.5 2.3*   PROTTOTAL 4.9* 4.7* 4.5*  --    ALBUMIN 1.6* 1.6* 1.5*  --    BILITOTAL 22.8* 20.9* 19.5*  --    ALKPHOS 421* 386* 382*  --    * 148* 169*  --    ALT 88* 100* 100*  --        Recent Labs  Lab 10/02/17  0641 10/01/17  0720 09/26/17  0530   INR 1.58* 2.01* 1.85*[DH1.2]      Imaging:[DH1.1]   No results found for this or any previous visit (from the past 24 hour(s)).[DH1.2]    Giovanna Quintero -[phone][DH1.1]         Revision History        User Key Date/Time User Provider Type Action    > DH1.2 10/2/2017  2:37 PM Giovanna Quintero MD Physician Sign     DH1.1 10/2/2017  2:19 PM Giovanna Quintero MD Physician             Progress Notes by Theo Gonzalez at 10/2/2017  2:09 PM     Author:  Theo Gonzalez Service:  Spiritual Health Author Type:      Filed:  10/2/2017  2:15 PM Date of Service:  10/2/2017  2:09 PM Creation Time:  10/2/2017  2:09 PM    Status:  Signed :  Theo Gonzalez ()         SPIRITUAL HEALTH SERVICES Progress Note  [facility] Med/Surg 5th floor    SH f/u visit with pt and his sister, Kelsea. Pt initially asleep at beginning of visit. Kelsea shared that \"we've got a long road ahead.\" Kelsea notes that she plans on living with pt in his home upon discharge, anticipating hospice care. Kelsea notes that she had a career at a nurse so \"I now some of what to expect.\" Kelsea notes that she is the closest of all the siblings to pt (Robin). Robin then became a bit restless and Kelsea lovingly shared with him that she was going to take care of him, that his liver was \"in bad " "shape and not going to get better\" and that she would stay and take care of him until she isn't needed. Robin was then very quiet and I asked him what he was thinking about and he said, \"Life.\" Then a bit later, \"I knew he would take me at some time.\" Kelsea then provided a moist washcloth to his lips and Robin fell back asleep. Kelsea appears accepting, grieving appropriately, and is also looking at things pragmatically. She expresses worry for Robin's son, Sherice. Will continue to follow up and provide support. I and the other chaplains remain available per pt/family/staff request.       KARTIK Strickland  Staff    Pager #[phone][DM1.1]        Revision History        User Key Date/Time User Provider Type Action    > DM1.1 10/2/2017  2:15 PM Theo Gonzalez Sign            Progress Notes by Monica Perez RD, LD at 10/2/2017  8:26 AM     Author:  Monica Perez RD, LD Service:  Nutrition Author Type:  Registered Dietitian    Filed:  10/2/2017 10:36 AM Date of Service:  10/2/2017  8:26 AM Creation Time:  10/2/2017  8:26 AM    Status:  Addendum :  Monica Perez RD, LD (Registered Dietitian)         CLINICAL NUTRITION SERVICES - REASSESSMENT NOTE      Malnutrition:[MS1.1] Severe[MS1.2]       EVALUATION OF PROGRESS TOWARD GOALS   Diet:  DD2 + thins  Supplements: Ensure with meals  With assist    Intake:  Barriers to adequate oral intakes previously including intubation with restricted diet post extubation (9/28/2017) and with severe esophagitis/superficial esophageal tear per EGD 9/5.  Diet advanced to clears 9/28 with further advanced to DD2 + thins 9/29.  Refusing meals, consuming bites to up to 75% of single food items.  Meeting <50% needs x 8 days since admit.[MS1.1]  This AM patient had breakfast tray in front of him, slowly taking extremely small bites.  Reports having no appetite and also identifies early satiety as a barrier.  Do question if patient fully comprehends " "conversation as extremely slow to respond to questions and often repeats questions.  Not able to verbalize parts of conversation at times (requested a \"honeycrisp apple\" multiple times during conversation though was not able to verbalize we do not carry this specific type of food after multiple reiterations).[MS1.3]      ASSESSED NUTRITION NEEDS (PER APPROVED PRACTICE GUIDELINES, Dosing weight: 60 Kg)  Estimated Energy Needs: 8345-5937+ kcals (30-35+ Kcal/Kg)  Justification: maintenance  Estimated Protein Needs:   grams protein (1.5-2 g pro/Kg)  Justification: maintenance  Estimated Fluid Needs: >1 mL/Kcal  Justification: maintenance      NEW FINDINGS:   - Palliative consulted, poor prognosis.  Not a liver transplant candidate.    - Meds[MS1.1] reviewed.[MS1.2]  - Labs[MS1.1] reviewed.[MS1.2]  - Wt[MS1.1] trending up throughout admit, likely r/t fluid status:[MS1.2]  Vitals:    09/27/17 0000 09/28/17 0000 09/29/17 0000 10/01/17 0603   Weight: 62.2 kg (137 lb 2 oz) 65.6 kg (144 lb 10 oz) 62.2 kg (137 lb 2 oz) 63 kg (139 lb)    10/02/17 0519   Weight: 64.5 kg (142 lb 3.2 oz)[MS1.4]   - BM[MS1.1] daily over the past at least 2 days.[MS1.2]    Previous Goals:   Diet >/=FL within 24-48 hours  Evaluation:[MS1.1] Met[MS1.2]    Previous Nutrition Diagnosis:   Malnutrition related to ETOH abuse and inadequate intake 2/2 upper GI bleed as evidenced by fat and muscle wasting, meeting <50% of estimated needs for > 5 days  Evaluation:[MS1.1] No change, continued below[MS1.2]      MALNUTRITION:[MS1.1] (10/2/2017)[MS1.2]  % Weight Loss: Unable to determine[MS1.1] though none throughout admit, could be masked by fluid retention[MS1.2]  % Intake:</= 50% for >/= 5 days (severe malnutrition) and suspect <75% for >1 month with ETOH abuse hx  Subcutaneous Fat Loss: Moderate to severe, as noted above  Muscle Loss: Moderate to severe, as noted above  Fluid Retention:Mild      Malnutrition Diagnosis: Severe malnutrition  In Context " of:  Acute illness or injury and Environmental or social circumstances    CURRENT NUTRITION DIAGNOSIS  Malnutrition related to[MS1.1] h/o etoh abuse with decreased appetiteand early satiety, subsequent reduced oral intakes[MS1.2] as evidenced by fat and muscle wasting, meeting <50% of estimated needs for[MS1.1] at least 8 days during admit.[MS1.2]    INTERVENTIONS  Recommendations / Nutrition Prescription[MS1.1]  Change to high calorie/high protein diet order.    Continue room service with assist to promote ordering of meals TID and to receive Ensure with meals TID.  Encouraged patient to consume Ensure/sip on Ensure between meals to enforce small, frequent meals with reports of poor appetite and early satiety.3    Add daily MVI/M.     Palliative/MD to outline goals of care relating to enteral nutrition support given based on trending, patient likely will not meet[MS1.2] repletion needs orally, chronically underweight, meeting criteria for severe malnutrition[MS1.5].  Recommend nasal tube if goals remain restorative.[MS1.2]       Implementation[MS1.1]  Multivitamin/Mineral: As above.    Collaboration and Referral of Nutrition care: Discussed POC with team during rounds[MS1.2] and with Abeba from Palliative.[MS1.6]    Goals[MS1.1]  Decisions regarding nutrition-related POC w/in 48 hrs.[MS1.2]      MONITORING AND EVALUATION:[MS1.1]  Progress towards goals will be monitored and evaluated per protocol and Practice Guidelines[MS1.2].[MS1.5]       Monica Perez RD, SHIRA  Clinical Dietitian  3rd floor/ICU: [phone]  All other floors: [phone]  Weekend/holiday: [phone][MS1.1]     Revision History        User Key Date/Time User Provider Type Action    > MS1.6 10/2/2017 10:36 AM Monica Perez RD, SHIRA Registered Dietitian Addend     [N/A] 10/2/2017 10:03 AM Monica Perez RD, LD Registered Dietitian Addend     MS1.5 10/2/2017 10:01 AM Monica Perez RD, SHIRA Registered Dietitian Sign     MS1.4  10/2/2017  9:50 AM Monica Perez RD, LD Registered Dietitian      MS1.2 10/2/2017  9:49 AM Monica Perez RD, LD Registered Dietitian      MS1.3 10/2/2017  9:44 AM Monica Perez, JOSE MIGUEL, LD Registered Dietitian      MS1.1 10/2/2017  8:26 AM Monica Perez RD, LD Registered Dietitian             Progress Notes by Giovanna Quintero MD at 10/2/2017 10:18 AM     Author:  Giovanna Quintero MD Service:  Hospitalist Author Type:  Physician    Filed:  10/2/2017 10:19 AM Date of Service:  10/2/2017 10:18 AM Creation Time:  10/2/2017 10:18 AM    Status:  Signed :  Giovanna Quintero MD (Physician)         [facility]    Sepsis Evaluation Progress Note    Date of Service: 10/02/2017    I was called to see Jon Toribio due to abnormal vital signs triggering the Sepsis SIRS screening alert. He is not known to have an infection.     Physical Exam    Vital Signs:  Temp: 96.1  F (35.6  C) Temp src: Oral BP: 96/62   Heart Rate: 103 Resp: 20 SpO2: 90 % O2 Device: None (Room air) Oxygen Delivery: 2 LPM    Lab:  Lactic Acid   Date Value Ref Range Status   10/02/2017 2.2 (H) 0.7 - 2.0 mmol/L Final       The patient is at baseline mental status.    The rest of their physical exam is significant for distended abdomen/lethargy.    Assessment and Plan    The SIRS and exam findings are likely due to progressive esld, there is no sign of sepsis at this time.    Disposition: The patient will remain on the current unit. We will continue to monitor this patient closely.    Giovanna Quintero MD, MD[DH1.1]       Revision History        User Key Date/Time User Provider Type Action    > DH1.1 10/2/2017 10:19 AM Giovanna Quintero MD Physician Sign            Progress Notes by Keith Jefferson MD at 10/1/2017 11:01 AM     Author:  Keith Jefferson MD Service:  Hospitalist Author Type:  Physician    Filed:  10/1/2017  2:36 PM Date of Service:  10/1/2017 11:01 AM Creation Time:  10/1/2017 11:01 AM    Status:  Addendum :  Ronny  Keith Torres MD (Physician)         [facility]  Hospitalist Progress Note[DE1.1]  Keith Jefferson MD[DE1.2] 10/1/17    Reason for Stay (Diagnosis): respiratory failure, etoh withdrawal         Assessment and Plan:      Summary of Stay: Jon Toribio is a 62 year old male with hx of etoh abuse with dependence and hx of withdrawal who has been drinking more lately and was found confused by a family member.  He then went unresponsive so the son started CPR and called EMS.  BG 39 for EMS.  He was in shock with lactic acidosis of 16 in the ED.  He was intubated for airway protection as he was obtunded and admitted to the ICU on 9/24/2017.  He had brinda melena and was put on octreotide gtt, protonix gtt, and ceftriaxone for presumed UGIB an possibility of varices.  GI consulted and he had EGD 9/5 showing severe esophagitis with a superficial esophageal tear. Initial labs show acute hepatitis presumably from etoh so he was started on methylprednisolone for a high DFscore.  Ammonia level was up that resolved with lactulose enemas.  His sedation was weaned and passed PST so extubated morning of 9/28.  Has evidence for malnutrition and multiple electrolyte deficiencies that are being replaced.  Advancing diet to DD2 per SLP.  So far no recurrent bleeding.  Transferred to medical floor for ongoing PT/OT and follow up of hepatitis.  Unfortunately significantly rising bilirubin indicative of progressive liver failure.  This is likely secondary to alcoholic hepatitis and is not responding to steroids that will likely be stopped by GI.  Infectious workup done to see if anything else could be contributing.  Will start ceftriaxone empirically to treat SBP as there is not much more we can offer.  Does not have a substantial amount of ascites.    Problem List/Assessment and Plan:   Acute on chronic alcoholic hepatitis:  Bilirubin 11-12, direct fraction is 9.6 so all likely liver related.  ALT wnl, and AST rending  down from 315.  Alk phos only minimally elevated. Liver shows fatty infiltration on CT but ascites is present.  I suspect some degree of cirrhosis.  INR elevated 2.0 range initially, some of which may be nutritional.  DF was > 100 so started methylprednisolone on 9/25/17.  -[facility] consulted, appreciate recs  -[DE1.1]was on[DE1.3] prednisolone 40mg daily, now day 7 and bilirubin up to 20 today indicating no improvement with steroids[DE1.1].  Per GI will stop steroids[DE1.3]  -suspect this is evolution of his alcoholic hepatitis and represents a very poor prognosis moving forward.  He does have some ascites so cannot say for certain no SBP with his leukocytosis so will start empiric cefotaxime.  Did bedside US and there is some ascites, however not a significant amount that patient would benefit from a therapeutic tap.  As I plan to fully treat SBP empirically due to his progressive liver failure will not risk bedside diagnostic paracentesis with small fluid and elevated INR.  IR consultation for tomorrow could be considered, but again the way his liver function is headed treatment regardless makes the most sense  -start empiric ceftriaxone 1g q 24 hrs (do not have cefotaxime here)  -not a liver transplant candidate due to daily etoh use before admission and would not benefit from transfer at this time as supportive care is the current treatment for this condition  -Discussed with [facility] today who will also discuss with patient.  Patient's son also uses alcohol per patient and he did appear impaired when visiting when patient was in the ICU.[DE1.1]  -repeat cmp and INR tomorrow[DE1.4]  -palliative care consult placed for tomorrow[DE1.3]    Elevated lactate:   Lactate 2.8, checked for meeting sirs protocol.  This is secondary to his liver failure due to alcoholic hepatitis.  His WBC is elevated in setting of high dose steroids most likely.  No fever and is not hypotensive.  His HR has remained around 100-110 throughout  admission.  No fluids given his anasarca and this more likely secondary to his liver disease.  Due to progress liver failure did initiated an infectious workup with chest xray negative for infiltrate.  Did obtain urine and blood cultures.  Will start empiric cefotaxime in case of SBP, however do not feel lactate is really secondary to sepsis and would not provide further fluid boluses for this reason.    Leukocytosis:  WBC up to 23.7 today.  WBC started rising with initiation of steroids for alcoholic hepatitis so this may be the sole etiology.  However with worsening liver failure did do infectious w/u.  -started ceftriaxone as above  -follow blood cultures  -check UA/UC    Acute encephalopathy, much improved: obtunded on arrival, possibly from alcohol withdrawal and hepatic encephalopathy, but cannot say for sure as he just received CPR.  Etoh level negative.  Suppose seizure also in differential given heavy etoh use.  Initially did not require sedation despite intubation/mechanical ventilation.  Head CT without acute findings.  Improving, but suspect still not at baseline and unclear how he would manage at home.    Shock due to UGI Bleed and underlying cirrhosis:  Weanedoff norepinephrine and vasopressin. Suspect a low BP at baseline from cirrhosis.    Acute blood loss anemia:  Rec'd 2 U PRBCs here.  Hg now stable without evidence for ongoing bleeding.    UGI Bleed:  Melena on admit.  Secondary to severe esophagitis and superficial esophageal tear.  -bid ppi now and on discharge    PENNY:  Looked all prerenal with creatinine 2.6 on admit.  Continue low rate MIVF-but will likely 3rd space in setting of hypoalbuminemia, cirrhosis.  Creatinine was down to 0.6, but now rising again to 1.1.    Alcohol abuse and dependence:  It is suspected he was drinking 1L alcohol per day.  Possible some withdrawal earlier in admission.  -d/c CIWA as no sign of withdrawal now and no recent ativan    Low K, mag, PO4, Ca: replacement  protocols    Hyponatremia, resolved: 120 on admit now Na 15and stable.  Will monitor as his diet is advanced.  Suspect some component of his liver disease.  Has 1-2+ LE edema and some ascites.     Coagulopathy:  INR 2.64 on admit.  Likely combination of liver disease and nutritional deficiency.  Down to 1.85 most recently.  -PT/INR tomorrow for Lille calculation    Dysphagia:  SLP on board    Severe malnutrition: suspect protein calore malnutrition.  -dietician consulted    DVT Prophylaxis: Pneumatic Compression Devices, no heparin for GI bleed  Code Status: Full Code.  This will need to be addressed moving forward.  Patient appears to be struggling with finding out the liver is much worse today and that if it continues in this trend he will likely die from this.  Will give him some time to consider this.[DE1.1]  Palliative care consulted for tomorrow.  FEN[DE1.3]: DD2 with thin liquids. SLP  Discharge Dispo: PT and OT on board.  TBD.  If liver failure worsening in upcoming days may need to consider hospice  Estimated Disch Date / # of Days until Disch:  TBD.  Progress liver failure, poor prognosis moving forward.         Interval History (Subjective):      Labs worsening this morning.  Patient feels the same, overall ok, tired.  Eating a little bit more.  Has a chronic cough, but nothing new. denies sob.  No abdominal pain.  No dysuria or diarrhea.  Did have a BM that was formed last night.      Discussed with patient his worsening liver tests and that overall if this continues his prognosis is unfortunately very poor.  He did not have much to say after this.  He said he understands and that it is what it is.  He understands it is most likely due to alcohol, but we will pursue infectious workup and start empiric abx in case there is SBP although this seems less likely.[DE1.1]  I did discuss this with his son and patient's friend in the room.  Everyone understandably saddened by the news.[DE1.4]                   Physical Exam:      Last Vital Signs:[DE1.1]  BP (!) 84/57 (BP Location: Right arm)  Pulse 95  Temp 98.3  F (36.8  C) (Oral)  Resp 24  Wt 63 kg (139 lb)  SpO2 95%[DE1.2]    Intake/Output Summary (Last 24 hours) at 10/01/17 1103  Last data filed at 10/01/17 1054   Gross per 24 hour   Intake              486 ml   Output               50 ml   Net              436 ml[DE1.5]     Constitutional: Awake, NAD  Eyes: sclera yellow  HEENT:  MMM  Respiratory: lungs clear without crackles or wheeze  Cardiovascular: tachycardic, no murmur    GI: moderately distended, but soft. Non-tender.  BS present  Skin: bruising to forearm.  jaundiced  Musculoskeletal/extremities: 2+ anasarca  Neurologic: alert, oriented    Psychiatric: calm, flat affect         Medications:      All current medications were reviewed with changes reflected in problem list.         Data:      All new lab and imaging data was reviewed.   Labs:[DE1.1]  Lab Results   Component Value Date    WBC 23.7 (H) 10/01/2017    WBC 24.4 (H) 09/30/2017    WBC 17.2 (H) 09/29/2017    HGB 9.2 (L) 10/01/2017    HGB 8.4 (L) 09/30/2017    HGB 8.0 (L) 09/29/2017    HCT 28.1 (L) 10/01/2017    HCT 25.1 (L) 09/30/2017    HCT 23.9 (L) 09/29/2017     10/01/2017     09/30/2017     (L) 09/29/2017     10/01/2017     09/30/2017     09/29/2017    POTASSIUM 4.6 10/01/2017    POTASSIUM 4.2 09/30/2017    POTASSIUM 4.1 09/29/2017    CHLORIDE 102 10/01/2017    CHLORIDE 103 09/30/2017    CHLORIDE 103 09/29/2017    CO2 22 10/01/2017    CO2 22 09/30/2017    CO2 25 09/29/2017    BUN 29 10/01/2017    BUN 20 09/30/2017    BUN 18 09/29/2017    CR 1.11 10/01/2017    CR 0.67 09/30/2017    CR 0.44 (L) 09/29/2017    GLC 72 10/01/2017     (H) 09/30/2017     (H) 09/29/2017     (H) 10/01/2017     (H) 09/30/2017     (H) 09/28/2017     (H) 10/01/2017     (H) 09/30/2017    ALT 65 09/28/2017    ALKPHOS 386 (H) 10/01/2017     ALKPHOS 382 (H) 09/30/2017    ALKPHOS 212 (H) 09/28/2017    BILITOTAL 20.9 (HH) 10/01/2017    BILITOTAL 19.5 (HH) 09/30/2017    BILITOTAL 12.6 (H) 09/28/2017    TEJA 24 09/28/2017    TEJA 36 09/27/2017    TEJA 28 09/26/2017    INR 2.01 (H) 10/01/2017    INR 1.85 (H) 09/26/2017    INR 2.00 (H) 09/25/2017[DE1.2]     Lactate 2.8    Imaging:   Chest xray:  No infiltrate, some dilated loops small bowel[DE1.1]      Keith Jefferson MD[DE1.2]           Revision History        User Key Date/Time User Provider Type Action    > DE1.4 10/1/2017  2:36 PM Keith Jefferson MD Physician Addend     DE1.3 10/1/2017 11:32 AM Keith Jefferson MD Physician Addend     DE1.5 10/1/2017 11:29 AM Keith Jefferson MD Physician Sign     DE1.2 10/1/2017 11:02 AM Keith Jefferson MD Physician      DE1.1 10/1/2017 11:01 AM Keith Jefferson MD Physician             Progress Notes by Prosper Schulz MD at 10/1/2017 11:06 AM     Author:  Prosper Schulz MD Service:  Gastroenterology Author Type:  Physician    Filed:  10/1/2017 11:18 AM Date of Service:  10/1/2017 11:06 AM Creation Time:  10/1/2017 11:06 AM    Status:  Signed :  Prosper Schulz MD (Physician)         GI Attending  Case discussed with hospitalist via telephone and patient's nurse on the floor.  Patient is a non-responder to steroids for his alcoholic hepatitis given calculated Lille score.  He denies any abdominal pain or nausea; lethargic.    Tmax 98.3  BP 84/57    RR 18  Patient awakens to stimuli; denies pain  Obvious scleral icterus and jaundice  Tachycardic, but regular. No murmur  Decreased breath sounds bilaterally; no rhonchi  Distended; positive bowel sounds appreciated.  No brinda rebound or guarding  +Edema  Patient did not cooperate for asterixis testing    Labs.  Lille Score > 0.45   T. Migue 20.9  AlkP 386     WBC 23.7  Hgb 9.2  I NR 2.01  Creatinine 1.11    Impression:  Severe alcoholic  hepatitis.  Non- responder to steroids with today's Lille score; steroids stopped.  Prognosis is very poor at this time.  He is not a liver transplant candidate given active ethanol consumption; no benefit in  transferring [facility] to [facility].  At this time, I would recommend hospice/palliative care consultation Monday AM.  Please call anytime with further questions.    Prosper Schulz MD  [facility]  [phone] Cell  [phone]  Office[JL1.1]     Revision History        User Key Date/Time User Provider Type Action    > JL1.1 10/1/2017 11:18 AM Prosper Schulz MD Physician Sign            Progress Notes by Keith Jefferson MD at 10/1/2017  8:44 AM     Author:  Keith Jefferson MD Service:  Hospitalist Author Type:  Physician    Filed:  10/1/2017  8:45 AM Date of Service:  10/1/2017  8:44 AM Creation Time:  10/1/2017  8:44 AM    Status:  Signed :  Keith Jefferson MD (Physician)         [facility]    Sepsis Evaluation Progress Note    Date of Service: 10/01/2017    I was called to see Jon Toribio due to abnormal vital signs triggering the Sepsis SIRS screening alert. He is not known to have an infection.     Physical Exam    Vital Signs:  Temp: 98.3  F (36.8  C) Temp src: Oral BP: (!) 84/57   Heart Rate: 109 Resp: 24 SpO2: 95 % O2 Device: Nasal cannula Oxygen Delivery: 2 LPM    Lab:  Lactic Acid   Date Value Ref Range Status   10/01/2017 2.8 (H) 0.7 - 2.0 mmol/L Final       The patient is at baseline mental status.    The rest of their physical exam is significant for distended abdomen that is non-tender, lungs cta bilat without crackles, 2-3+ LE edema, mild tachycardia, jaundice.    Assessment and Plan    The SIRS and exam findings are likely due to acute severe liver failure, there is no sign of sepsis at this time.  Will be performing infectious w/u, however no empiric abx right now.  No fluids either as he is significantly hypervolemia and the  elevated lactate is almost certainly due to his liver failure.    Disposition: The patient will remain on the current unit. We will continue to monitor this patient closely.    Keith Jefferson MD[DE1.1]       Revision History        User Key Date/Time User Provider Type Action    > DE1.1 10/1/2017  8:45 AM Keith Jefferson MD Physician Sign                  Procedure Notes     No notes of this type exist for this encounter.      Progress Notes - Therapies (Notes from 10/01/17 through 10/04/17)     No notes of this type exist for this encounter.

## 2017-09-25 NOTE — PROGRESS NOTES
RT-Received pt from ER on CMV 14/400/50%/+5. Decreased fio2 to 40%. Bs diminished. Spo2 100%. RT will continue to follow.

## 2017-09-25 NOTE — PROGRESS NOTES
Respiratory Therapy Note         Pt remains on full vent support and is not ready for a PS attempt at this time due to need for pressures.  Good synchrony with the vent.  Breath sounds are diminished.  Pt has a strong cough with moderate amounts of clear thin sputum suctioned back.      September 25, 2017.3:33 PM  Teddy Hendricks

## 2017-09-25 NOTE — PROGRESS NOTES
RT Note:    Patient transferred from ED to ICU without complications. Current ventilator settings are RR:14, Vt:400, PEEP: +5, and FiO2: 50%. RT will continue to monitor.    Leisa Martinez  September 24, 2017.9:55 PM

## 2017-09-25 NOTE — ED PROVIDER NOTES
History     Chief Complaint:  unresponsive      HPI   Jon Toribio is a 62 year old male who presents via EMS with unresponsive episode. History is quite limited as the patient is obtunded and unable to provide any historical information. Per EMS the son found the patient unresponsive on the floor. It was uncertain how long the patient was on the floor and unresponsive. He immediately called 911 and when EMS arrived they noted that his blood sugar was below 40. He was given ample D50 with blood sugar returning greater than 200 on recheck with no improvement of mental status. The patient was ventilating on his own but was unresponsive. Peripheral IV was placed and brought to the emergency department for further evaluation. EMS noted melanotic stool present. Further history is unknown other than reported history of chronic alcohol use per the son at the scene.    Allergies:  Unknown    Medications:    Unknown    No current outpatient prescriptions on file.    Problem List:    Chronic alcohol abuse    Past Medical History:    Alcohol abuse    Past Surgical History:    Past Surgical History:   Procedure Laterality Date     C NONSPECIFIC PROCEDURE      Vasectomy       Family History:      Family History   Problem Relation Age of Onset     HEART DISEASE Father      Neurologic Disorder Mother      dementia     Dementia Mother      CANCER Brother      throat       Social History:  Marital Status:   [4]  Social History   Substance Use Topics     Smoking status: Current Every Day Smoker     Packs/day: 1.00     Types: Cigarettes     Smokeless tobacco: Not on file      Comment: 1/2 pack daily     Alcohol use No      Comment: binge drinking for past 2 weeks        Review of Systems   Unable to perform ROS: Patient unresponsive       Physical Exam   First Vitals:  BP: (!) 78/50  Pulse: 118  Heart Rate: 118  Temp: 93  F (33.9  C)  Resp: 20  Weight: 63.6 kg (140 lb 3.4 oz)  SpO2: 99 %    Physical Exam     General:    Toxic appearing, disheveled  male who appears older than stated age.  HEENT:    Oropharynx is dry without lesions or trismus.     No scalp hematoma or defect to bony calvarium  Eyes:    Conjunctiva normal     PERRL  Neck:    Trachea midline     C collar in place    CV:     Tachycardic, regular rhythm.      No murmurs, rubs or gallops.       No JVD or unilateral leg swelling.       Faint radial pulses bilateral.       No lower extremity edema.  PULM:    Clear to auscultation bilateral.       Spontaneous respirations     Good air exchange.     No rales or wheezing.     No stridor.  ABD:    Firm, mild distension     No overt tenderness     No pulsatile masses.       No rebound, guarding or rigidity.  :   Moderate amount of melanotic stool  MSK:     No gross deformity to all four extremities.   LYMPH:   No cervical lymphadenopathy.  NEURO:   GCS: E2 V2 M1 = 5     No tremor or seizure activity     No clonus      Good muscle tone, no atrophy.  Skin:    Warm, dry        Emergency Department Course   ECG:  Normal sinus rhythm - sinus tachycardia  Jonn Smith MD    Imaging:  XR Chest Port 1 View  ET tube and nasogastric tube in good position. Heart size  and pulmonary vascular normal. Lungs are clear.  Report per radiology.     CT Head w/o Contrast  Chronic changes. No evidence for intracranial hemorrhage  or any acute process.  Report per radiology.     CT Cervical Spine w/o Contrast  1. Grade 1 degenerative spondylolisthesis at C4-C5.  2. Multilevel degenerative disc and facet disease most advanced at  C5-C6 where there is moderate central canal stenosis and moderate to  severe bilateral neural foraminal stenosis.  3. No evidence for fracture.  Report per radiology.     CT Chest Abdomen Pelvis w/o Contrast  1. Nonspecific peritoneal fluid within the abdomen and pelvis.  2. Evaluation of the bowel is suboptimal due to lack of contrast  enhancement. Diffuse bowel wall thickening may be present. There is  no  definitive evidence of bowel obstruction. No free peritoneal air.  3. Severe hepatic fatty infiltration.  4. Bilateral posterior dependent pulmonary mild atelectasis.  Report per radiology.     Radiographic findings were communicated with the Admitting MD who voiced understanding of the findings.    Laboratory:  Results for orders placed or performed during the hospital encounter of 09/24/17 (from the past 24 hour(s))   UA with Microscopic   Result Value Ref Range    Color Urine Tonya     Appearance Urine Cloudy     Glucose Urine 50 (A) NEG^Negative mg/dL    Bilirubin Urine Moderate (A) NEG^Negative    Ketones Urine Negative NEG^Negative mg/dL    Specific Gravity Urine 1.018 1.003 - 1.035    Blood Urine Small (A) NEG^Negative    pH Urine 5.0 5.0 - 7.0 pH    Protein Albumin Urine 30 (A) NEG^Negative mg/dL    Urobilinogen mg/dL 4.0 (H) 0.0 - 2.0 mg/dL    Nitrite Urine Negative NEG^Negative    Leukocyte Esterase Urine Negative NEG^Negative    Source Catheterized Urine     WBC Urine 11 (H) 0 - 2 /HPF    RBC Urine 6 (H) 0 - 2 /HPF    Squamous Epithelial /HPF Urine 1 0 - 1 /HPF    Mucous Urine Present (A) NEG^Negative /LPF    Hyaline Casts 64 (H) 0 - 2 /LPF    Amorphous Crystals Many (A) NEG^Negative /HPF   Methicillin Resistant Staph Aureus PCR   Result Value Ref Range    Specimen Description Nares     S Aur Meth Resis PCR Negative NEG^Negative   Colorectal Surgery IP Consult: Patient to be seen: Routine - within 24 hours; critically ill patient w/ possible colitis; Consultant may enter orders: Yes    Narrative    Palmira Saldaña PA-C     9/25/2017  9:41 AM  [facility]  Colon and Rectal Surgery Consult Note  Name: Jon Toribio    MRN: [id number]  YOB: 1955    Age: 62 year old  Date of admission: 9/24/2017  Primary care provider: No primary care provider on file.     Requesting Physician:  Dr. Choi  Reason for consult:  Critically ill patient with possible colitis            History of Present Illness:   Jon Troibio is a 62 year old male, seen at the request of   Dr. Choi, presents with severe hypoglycemia and shock   state as he was found unresponsive by his family after 5 days of   heavy alcohol intake. PMHx substance abuse, depression, alcohol   withdrawal. Per chart review, pt had been binging, greater than   baseline, for the past 5 days. He was found unresponsive and EMS   was called.      His blood glucose initially on the field was 39. He was also   found to be hypotensive. Pt had elevated lactic acid to 16, now   improved to 3.4. He is s/p 2 units RBC, 2 units of FFP. Hgb   stable at 7.5. No leukocytosis. He had arterial line placed and   is responding to pressors. INR remains elevated at 2.0 s/p   reversal.   CAP CT scan with nonspecific peritoneal fluid within the abdomen   and pelvis and diffuse bowel wall thickening. No definitive   evidence of bowel obstruction or free peritoneal air. Severe   hepatic fatty infiltration. Bilateral posterior dependent   pulmonary mild atelectasis.     Colonoscopy History:  10/12/2006 with single transverse and two   rectal polyps removed.     Surgical History: None per chart review            Past Medical History:     Past Medical History:   Diagnosis Date     Other and unspecified hyperlipidemia      Substance abuse              Past Surgical History:     Past Surgical History:   Procedure Laterality Date     C NONSPECIFIC PROCEDURE      Vasectomy               Social History:     Social History   Substance Use Topics     Smoking status: Current Every Day Smoker     Packs/day: 1.00     Types: Cigarettes     Smokeless tobacco: Not on file      Comment: 1/2 pack daily     Alcohol use No      Comment: binge drinking for past 2 weeks             Family History:     Family History   Problem Relation Age of Onset     HEART DISEASE Father      Neurologic Disorder Mother      dementia     Dementia Mother      CANCER Brother       throat             Allergies:   No Known Allergies          Medications:       cefTRIAXone  2 g Intravenous Q24H     IV infusion builder WITH LARGE additive list   Intravenous Q24H       insulin aspart  1-4 Units Subcutaneous Q4H             Review of Systems:   A comprehensive greater than 10 system review of systems was   carried out.  Pertinent positives and negatives are noted above.    Otherwise negative for contributory info.            Physical Exam:     Blood pressure (!) 76/50, pulse 118, temperature 100.6  F (38.1    C), resp. rate 14, weight 58 kg (127 lb 12.8 oz), SpO2 100 %.    Intake/Output Summary (Last 24 hours) at 09/25/17 0816  Last data filed at 09/25/17 0600   Gross per 24 hour   Intake          2736.79 ml   Output              175 ml   Net          2561.79 ml     EXAM:  GEN:  Intubated, obtunded, does not respond to questions or   commands.  appears older than stated age  PULM: Intubated  ABD: Soft, non tender, mildly distended. no rebound or guarding   RECTAL: Rectal exam was deferred  NEURO: CN II-XII grossly intact  MSK: extremeties with no clubbing, cyanosis or edema;   EXT/SKIN: inspection reveals no rashes, lesions or ulcers, normal   coloring         Data Reviewed:     Results for orders placed or performed during the hospital   encounter of 09/24/17   CT Head w/o Contrast    Narrative    CT SCAN OF THE HEAD WITHOUT CONTRAST   9/24/2017 6:44 PM     HISTORY: Unresponsive.    TECHNIQUE:  Axial images of the head and coronal reformations   without  IV contrast material.  Radiation dose for this scan was reduced   using  automated exposure control, adjustment of the mA and/or kV   according  to patient size, or iterative reconstruction technique.    COMPARISON: None.    FINDINGS: There is some mild cerebral atrophy. Minimal patchy   white  matter changes are present in both hemispheres without mass   effect.  There is no evidence for intracranial hemorrhage, mass effect,   acute  infarct, or  skull fracture. Visualized paranasal sinuses and   mastoid  air cells are clear. Vascular calcifications noted.      Impression    IMPRESSION: Chronic changes. No evidence for intracranial   hemorrhage  or any acute process.    CHERRY MADDOX MD   XR Chest Port 1 View    Narrative    CHEST PORTABLE ONE VIEW   9/24/2017 6:01 PM     HISTORY: Unresponsive.    COMPARISON: None.      Impression    IMPRESSION: ET tube and nasogastric tube in good position. Heart   size  and pulmonary vascular normal. Lungs are clear.    CHERRY MADDOX MD   Cervical spine CT w/o contrast    Narrative    CT CERVICAL SPINE WITHOUT CONTRAST   9/24/2017 6:44 PM     HISTORY: Unresponsive.     TECHNIQUE: Axial images of the cervical spine were obtained   without  intravenous contrast. Multiplanar reformations were performed.  Radiation dose for this scan was reduced using automated exposure  control, adjustment of the mA and/or kV according to patient   size, or  iterative reconstruction technique.     COMPARISON: None.    FINDINGS: Sagittal reconstructions demonstrate minimal   degenerative  spondylolisthesis of C4 on C5 measuring approximately 2 to 3 mm.   Disc  space narrowing is present at C5-C6. There is no evidence for any  acute fracture.    C1-C2: Degenerative changes are present. There is no stenosis.    C2-C3: Left-sided uncinate spurring and minimal facet hypertrophy   is  present. There is no stenosis.    C3-C4: Moderate left-sided facet hypertrophy and posterior   osteophyte  formation is present. There is also some left-sided uncinate   spurring.  There is moderate left-sided foraminal stenosis and mild central   canal  stenosis.    C4-C5: Uncinate spurring and facet hypertrophy is present on the   left  greater than right causing some moderate left-sided foraminal  stenosis. There is also mild central canal stenosis due to   broad-based  disc bulging and some minimal spondylolisthesis.    C5-6: Broad-based posterior osteophyte  formation and facet   hypertrophy  is present causing moderate central canal stenosis and moderate   to  severe bilateral neural foraminal stenosis.    C6-7: Posterior osteophyte formation, disc bulge and facet   hypertrophy  is present causing mild to moderate bilateral neural foraminal  stenosis and mild central canal stenosis.    C7-T1: Normal.      Impression    IMPRESSION:  1. Grade 1 degenerative spondylolisthesis at C4-C5.  2. Multilevel degenerative disc and facet disease most advanced   at  C5-C6 where there is moderate central canal stenosis and moderate   to  severe bilateral neural foraminal stenosis.  3. No evidence for fracture.    CHERRY MADDOX MD   CT Chest Abdomen Pelvis w/o Contrast    Narrative    CT CHEST, ABDOMEN, PELVIS WITHOUT CONTRAST 9/24/2017 6:45 PM     HISTORY: Unresponsive.    Radiation dose for this scan was reduced using automated exposure  control, adjustment of the mA and/or kV according to patient   size, or  iterative reconstruction technique.    FINDINGS:   Chest: Endotracheal tube tip is approximately 4 cm from the   ailyn  midway between the clavicles and ailyn. Nasogastric tube extends   into  the stomach. The lungs are grossly clear with only mild posterior  dependent atelectasis at the lung bases. No pleural effusion or  pneumothorax is demonstrated. The mediastinum and josselyn are   grossly  unremarkable for the unenhanced technique.    Abdomen/pelvis: There is marked hepatic fatty infiltration.   Contents  of the gallbladder are relatively radiodense measuring   approximately  30 HU. Mild to moderate peritoneal fluid is noted within the   pelvis,  paracolic gutters, and adjacent to the spleen. No renal stones   are  demonstrated. There is no hydronephrosis. The bowel is not well  evaluated without contrast enhancement. Diffuse small bowel wall  thickening and colonic wall thickening may be present. Sigmoid  diverticulosis is also noted. A Street catheter is present within    the  urinary bladder which is relatively collapsed.      Impression    IMPRESSION:  1. Nonspecific peritoneal fluid within the abdomen and pelvis.  2. Evaluation of the bowel is suboptimal due to lack of contrast  enhancement. Diffuse bowel wall thickening may be present. There   is no  definitive evidence of bowel obstruction. No free peritoneal air.  3. Severe hepatic fatty infiltration.  4. Bilateral posterior dependent pulmonary mild atelectasis.    RAMIN GUILLORY MD         Recent Labs  Lab 09/25/17 0600 09/24/17 2330 09/24/17 2230 09/24/17  1716   WBC 8.9  --  10.6  --  15.6*   HGB 7.5* 7.7* 8.0*  < > 6.0*   HCT 22.4*  --  23.5*  --  18.4*   MCV 96  --  97  --  105*   PLT 91*  --  85*  --  144*   < > = values in this interval not displayed.    Recent Labs  Lab 09/25/17 0600 09/24/17 2230 09/24/17  1906  09/24/17  1716   * 124* 123*  < > 120*   POTASSIUM 4.3 4.9 4.9  < > 5.5*   CHLORIDE 95 89* 88*  < > 81*   CO2 22 18* 17*  --  16*   ANIONGAP 10 17* 18*  < > 23*   * 89 108*  < > 122*   BUN 42* 40* 42*  < > 44*   CR 1.68* 2.15* 2.34*  --  2.63*   GFRESTIMATED 42* 31* 28*  < > 25*   GFRESTBLACK 50* 38* 34*  < > 30*   HILTON 5.7* 5.9* 5.7*  --  6.0*   MAG  --  1.7  --   --  2.1   PHOS  --  3.0  --   --   --    PROTTOTAL 4.5*  --   --   --  4.7*   ALBUMIN 1.8*  --   --   --  1.8*   BILITOTAL 8.3*  --   --   --  8.7*   ALKPHOS 159*  --   --   --  195*   *  --   --   --  316*   ALT 42  --   --   --  46   < > = values in this interval not displayed.    Recent Labs  Lab 09/25/17 0625 09/24/17 2230 09/24/17  1716   INR 2.00* 2.10* 2.64*       Recent Labs  Lab 09/25/17  0600 09/24/17 2230 09/24/17  1800   LACT 3.4* 8.7* 12.8*       Recent Labs  Lab 09/24/17  2141   COLOR Tonya   APPEARANCE Cloudy   URINEGLC 50*   URINEBILI Moderate*   URINEKETONE Negative   SG 1.018   UBLD Small*   URINEPH 5.0   PROTEIN 30*   NITRITE Negative   LEUKEST Negative   RBCU 6*   WBCU 11*         Assessment and Plan:    Jon Toribio is a 62 year old male presents after he was   found unresponsive by family and EMS called, found to have severe   hypoglycemia and in shock state after 5 days of heavy alcohol   intake. PMHx substance abuse, depression, alcohol withdrawal.     His blood glucose initially on the field was 39. He was also   found to be hypotensive. Pt had elevated lactic acid to 16, now   improved to 3.4. He is s/p 2 units RBC, 2 units of FFP. Hgb   stable at 7.5. No leukocytosis. He had arterial line placed and   is responding to pressors. INR remains elevated at 2.0 s/p   reversal. CAP CT scan with nonspecific peritoneal fluid within   the abdomen and pelvis and diffuse bowel wall thickening. No   definitive evidence of bowel obstruction or free peritoneal air.   Severe hepatic fatty infiltration. Bilateral posterior dependent   pulmonary mild atelectasis. He continues with low grade fever and   tachycardia to 100-110s.    Plan:  1. Admit to ICU  2. Surgery: No emergent surgery indicated. Diffuse small bowel   thickening on imaging most likely related to global   hypoproteinemia and general anasarca. Appreciate intensivist   management of medical comorbidities. Pt poor surgical candidate   in setting of hypercoagulable state, electrolyte disturbance,   multiple comorbidities. Will continue to follow peripherally and   be available for questions.   3. Diet: NPO  4. IV Fluids: As ordered  5. Antibiotics:  Not indicated from CRS standpoint  6. Medications: Per intensivist  7. I&O s:  strict I&O s  8. Labs:   - Reviewed: by myself  9. Imaging:   - Dr. Looney and myself have personally viewed: CT abd/pelvis  10. DVT prophylaxis: SCD s  11. This plan has been discussed with Dr. Looney    Patient specific identified risk factors considered as part of   today s evaluation include: substance abuse, global   hypoproteinemia, depression, electrolyte disturbance.     Additional history obtained from chart review, nurse.  Time  spent   on consultation: 30minutes, greater than 50 percent of the total   encounter time is spent in counseling and/or coordination of care          Palmira Saldaña PA-C  [facility]  Phone:  [phone]      Glucose by meter   Result Value Ref Range    Glucose 105 (H) 70 - 99 mg/dL   Procalcitonin   Result Value Ref Range    Procalcitonin 2.95 ng/ml   Basic metabolic panel   Result Value Ref Range    Sodium 124 (L) 133 - 144 mmol/L    Potassium 4.9 3.4 - 5.3 mmol/L    Chloride 89 (L) 94 - 109 mmol/L    Carbon Dioxide 18 (L) 20 - 32 mmol/L    Anion Gap 17 (H) 3 - 14 mmol/L    Glucose 89 70 - 99 mg/dL    Urea Nitrogen 40 (H) 7 - 30 mg/dL    Creatinine 2.15 (H) 0.66 - 1.25 mg/dL    GFR Estimate 31 (L) >60 mL/min/1.7m2    GFR Estimate If Black 38 (L) >60 mL/min/1.7m2    Calcium 5.9 (LL) 8.5 - 10.1 mg/dL   Calcium ionized whole blood   Result Value Ref Range    Calcium Ionized Whole Blood 3.2 (L) 4.4 - 5.2 mg/dL   CBC with platelets   Result Value Ref Range    WBC 10.6 4.0 - 11.0 10e9/L    RBC Count 2.43 (L) 4.4 - 5.9 10e12/L    Hemoglobin 8.0 (L) 13.3 - 17.7 g/dL    Hematocrit 23.5 (L) 40.0 - 53.0 %    MCV 97 78 - 100 fl    MCH 32.9 26.5 - 33.0 pg    MCHC 34.0 31.5 - 36.5 g/dL    RDW 16.5 (H) 10.0 - 15.0 %    Platelet Count 85 (L) 150 - 450 10e9/L   INR   Result Value Ref Range    INR 2.10 (H) 0.86 - 1.14   Lactic acid whole blood   Result Value Ref Range    Lactic Acid 8.7 (HH) 0.7 - 2.0 mmol/L   CK total   Result Value Ref Range    CK Total 212 30 - 300 U/L   Magnesium   Result Value Ref Range    Magnesium 1.7 1.6 - 2.3 mg/dL   Phosphorus   Result Value Ref Range    Phosphorus 3.0 2.5 - 4.5 mg/dL   Blood gas arterial with oxyhemoglobin (1200)   Result Value Ref Range    pH Arterial 7.41 7.35 - 7.45 pH    pCO2 Arterial 28 (L) 35 - 45 mm Hg    pO2 Arterial 182 (H) 80 - 105 mm Hg    Bicarbonate Arterial 18 (L) 21 - 28 mmol/L    FIO2 50%     Oxyhemoglobin Arterial 99 92 - 100 %    Base Deficit Art 6.2  mmol/L   Gastroenterology IP Consult: suspected upper gi bleed; Consultant may enter orders: Yes; Patient to be seen: Routine - within 24 hours; Requested Clinic/Group: [facility]    Narrative    Kika Bullard PA-C     9/25/2017 10:18 AM  GASTROENTEROLOGY CONSULTATION      Jon Toribio  [id number] [facility] [id number]  62 year old male     Admission Date/Time: 9/24/2017  Primary Care Provider: No primary care provider on file.     We were asked to see the patient in consultation by Dr. Choi for evaluation of melena and hematemesis.    CC: severe hypoglycemia/shock     HPI:  Jon Toribio is a 62 year old male with past medical   history significant for substance abuse, depression, alcohol   withdrawal presenting to the hospital with severe hypoglycemia   and shock state. Patient not able to provide history, obtained   from EHR. Patient was found unresponsive by family 9/24 after   binge drinking on vodka over the past 5 days approximately. On   EMS arrival patient had blood glucose 39 and in shock with   profound lactic acidosis. He reportedly had brinda melena and   coffee ground material suctioned from his stomach in the ER. He   was placed on octreotide, Protonix drips and ceftriaxone. On   review of prior imaging, CT noted severe fatty infiltration of   the liver but no clear evidence of liver cirrhosis or liver   lesions, or varices. On admission, HGB noted to be 6 with   platelets low 144. INR 2.64. Total bilirubin 8.7. Alk phos 195,   normal ALT 46, elevated , and elevated creatinine 2.63.   Lactic acid 16. He has low grade fever since admission. I do not   see a prior history of EGD. No NSAIDs, diuretics, or PPIs listed   on PTA meds. He is currently requiring pressor support and is   intubated for airway protection. He has received ~4 units of   blood. HGB improved to 8.8, now trending down to 7.5. No signs of   ongoing bleeding since admission to  ICU.     CT chest/abd/pelvis (noncontrast) noted possible diffuse bowel   thickening although difficult to assess with no contrast.        PAST MEDICAL HISTORY:  Patient Active Problem List    Diagnosis Date Noted     GI hemorrhage 09/24/2017     Priority: Medium     Hyperlipidemia      Priority: Medium     Problem list name updated by automated process. Provider to   review            ROS: A comprehensive ten point review of systems was negative   aside from those in mentioned in the HPI.       MEDICATIONS:   No current outpatient prescriptions on file.        ALLERGIES: No Known Allergies     SOCIAL HISTORY:  Social History   Substance Use Topics     Smoking status: Current Every Day Smoker     Packs/day: 1.00     Types: Cigarettes     Smokeless tobacco: Not on file      Comment: 1/2 pack daily     Alcohol use No      Comment: binge drinking for past 2 weeks        FAMILY HISTORY:  Family History   Problem Relation Age of Onset     HEART DISEASE Father      Neurologic Disorder Mother      dementia     Dementia Mother      CANCER Brother      throat        PHYSICAL EXAM:   BP 97/58 (BP Location: Right arm)  Pulse 118  Temp 100.4  F (38    C)  Resp 30  Wt 58 kg (127 lb 12.8 oz)  SpO2 99%     PHYSICAL EXAM:  General: intubated, nonresponsive (not sedated)  SKIN: +jaundice, +spider angiomas  HEAD: Normocephalic. No masses, lesions, tenderness or   abnormalities  NECK: Neck supple. No adenopathy. Thyroid symmetric, normal size.  EYES: No scleral icterus  ENT: ENT exam normal, no neck nodes or sinus tenderness  RESPIRATORY: negative, Good diaphragmatic excursion. Lungs clear  CARDIOVASCULAR: negative, PMI normal. No lifts, heaves, or   thrills. RRR. No murmurs, clicks gallops or rub  GASTROINTESTINAL: +BS, soft, NT, ND, no HSM, no   masses/guarding/rebound  JOINT/EXTREMITIES: extremities normal- no gross deformities   noted, and normal muscle tone  NEURO: minimally responsive  LYMPH: No anterior cervical, posterior  cervical, or   supraclavicular adenopathy     LABS:  I reviewed the patient's new clinical lab test results.   Recent Labs   Lab Test  09/25/17   0625  09/25/17   0600 09/24/17   2330  09/24/17 2230 09/24/17   1716   WBC   --   8.9   --   10.6   --   15.6*   HGB   --   7.5*  7.7*  8.0*   < >  6.0*   MCV   --   96   --   97   --   105*   PLT   --   91*   --   85*   --   144*   INR  2.00*   --    --   2.10*   --   2.64*    < > = values in this interval not displayed.     Recent Labs   Lab Test  09/25/17 0600 09/24/17 2230 09/24/17   1906   NA  127*  124*  123*   POTASSIUM  4.3  4.9  4.9   CHLORIDE  95  89*  88*   CO2  22  18*  17*   BUN  42*  40*  42*   ANIONGAP  10  17*  18*   HILTON  5.7*  5.9*  5.7*     Recent Labs   Lab Test  09/25/17 0600 09/24/17   2141 09/24/17   1716   ALBUMIN  1.8*   --   1.8*   BILITOTAL  8.3*   --   8.7*   ALT  42   --   46   AST  249*   --   316*   ALKPHOS  159*   --   195*   PROTEIN   --   30*   --         IMAGING  I personally reviewed the patient's new imaging results.    CT CHEST, ABDOMEN, PELVIS WITHOUT CONTRAST 9/24/2017 6:45 PM      HISTORY: Unresponsive.     Radiation dose for this scan was reduced using automated exposure  control, adjustment of the mA and/or kV according to patient   size, or  iterative reconstruction technique.     FINDINGS:   Chest: Endotracheal tube tip is approximately 4 cm from the   ailyn  midway between the clavicles and ailyn. Nasogastric tube extends   into  the stomach. The lungs are grossly clear with only mild posterior  dependent atelectasis at the lung bases. No pleural effusion or  pneumothorax is demonstrated. The mediastinum and josselyn are   grossly  unremarkable for the unenhanced technique.     Abdomen/pelvis: There is marked hepatic fatty infiltration.   Contents  of the gallbladder are relatively radiodense measuring   approximately  30 HU. Mild to moderate peritoneal fluid is noted within the   pelvis,  paracolic gutters, and  adjacent to the spleen. No renal stones   are  demonstrated. There is no hydronephrosis. The bowel is not well  evaluated without contrast enhancement. Diffuse small bowel wall  thickening and colonic wall thickening may be present. Sigmoid  diverticulosis is also noted. A Street catheter is present within   the  urinary bladder which is relatively collapsed.         IMPRESSION:  1. Nonspecific peritoneal fluid within the abdomen and pelvis.  2. Evaluation of the bowel is suboptimal due to lack of contrast  enhancement. Diffuse bowel wall thickening may be present. There   is no  definitive evidence of bowel obstruction. No free peritoneal air.  3. Severe hepatic fatty infiltration.  4. Bilateral posterior dependent pulmonary mild atelectasis.     CONSULTATION ASSESSMENT AND PLAN:    62 year old male with history of alcohol abuse presenting to the   hospital in hypoglycemic shock in setting of binge drinking   episode with evidence of brinda melena and concern for hematemesis   in the ER with noted anemia, HGB 6, and acute renal   insufficiency. He has required intubation and pressor support.   Nurse reports he received 6 L of fluid. Currently, appears to   have stabilized without ongoing signs of brinda GIB. I do not see   any prior evidence of liver cirrhosis and CT imaging does not   indicate this although he does have biochemical evidence of   chronic liver disease with elevated INR and thrombocytopenia. He   has noted ETOH hepatitis. Steroids contraindicated in setting of   GIB. Will plan on EGD in ICU to further assess. Continue PPI,   octreotide drips. Serial HGB and transfuse prn. Continue   ceftriaxone for SBP prophylaxis x 7 days. Monitor LFTs,   creatinine, INR. I will review with Dr. Ospina when able.     Thank you for asking us to participate in the care of this   patient.    Kika Bullard PA-C  [facility]   Glucose by meter   Result Value Ref Range    Glucose 88 70 - 99 mg/dL    Hemoglobin   Result Value Ref Range    Hemoglobin 7.7 (L) 13.3 - 17.7 g/dL   Blood culture   Result Value Ref Range    Specimen Description Blood     Culture Micro       Canceled, Test credited  Duplicate request  Charge credited     Glucose by meter   Result Value Ref Range    Glucose 107 (H) 70 - 99 mg/dL   Glucose by meter   Result Value Ref Range    Glucose 101 (H) 70 - 99 mg/dL   Methicillin Resistant Staph Aureus PCR   Result Value Ref Range    Specimen Description Nares     S Aur Meth Resis PCR Negative NEG^Negative   Glucose by meter   Result Value Ref Range    Glucose 109 (H) 70 - 99 mg/dL   Basic metabolic panel   Result Value Ref Range    Sodium 127 (L) 133 - 144 mmol/L    Potassium 4.3 3.4 - 5.3 mmol/L    Chloride 95 94 - 109 mmol/L    Carbon Dioxide 22 20 - 32 mmol/L    Anion Gap 10 3 - 14 mmol/L    Glucose 109 (H) 70 - 99 mg/dL    Urea Nitrogen 42 (H) 7 - 30 mg/dL    Creatinine 1.68 (H) 0.66 - 1.25 mg/dL    GFR Estimate 42 (L) >60 mL/min/1.7m2    GFR Estimate If Black 50 (L) >60 mL/min/1.7m2    Calcium 5.7 (LL) 8.5 - 10.1 mg/dL   Calcium, ionized (1200)   Result Value Ref Range    Calcium Ionized 3.3 (L) 4.4 - 5.2 mg/dL   CBC with platelets differential   Result Value Ref Range    WBC 8.9 4.0 - 11.0 10e9/L    RBC Count 2.33 (L) 4.4 - 5.9 10e12/L    Hemoglobin 7.5 (L) 13.3 - 17.7 g/dL    Hematocrit 22.4 (L) 40.0 - 53.0 %    MCV 96 78 - 100 fl    MCH 32.2 26.5 - 33.0 pg    MCHC 33.5 31.5 - 36.5 g/dL    RDW 16.9 (H) 10.0 - 15.0 %    Platelet Count 91 (L) 150 - 450 10e9/L    Diff Method Automated Method     % Neutrophils 79.4 %    % Lymphocytes 13.0 %    % Monocytes 5.2 %    % Eosinophils 0.1 %    % Basophils 0.0 %    % Immature Granulocytes 1.3 %    Nucleated RBCs 1 (H) 0 /100    Absolute Neutrophil 7.2 1.6 - 8.3 10e9/L    Absolute Lymphocytes 1.2 0.8 - 5.3 10e9/L    Absolute Monocytes 0.5 0.0 - 1.3 10e9/L    Absolute Eosinophils 0.0 0.0 - 0.7 10e9/L    Absolute Basophils 0.0 0.0 - 0.2 10e9/L    Abs  Immature Granulocytes 0.1 0 - 0.4 10e9/L    Absolute Nucleated RBC 0.1    Hepatic panel   Result Value Ref Range    Bilirubin Direct 6.8 (H) 0.0 - 0.2 mg/dL    Bilirubin Total 8.3 (H) 0.2 - 1.3 mg/dL    Albumin 1.8 (L) 3.4 - 5.0 g/dL    Protein Total 4.5 (L) 6.8 - 8.8 g/dL    Alkaline Phosphatase 159 (H) 40 - 150 U/L    ALT 42 0 - 70 U/L     (H) 0 - 45 U/L   Lactic acid   Result Value Ref Range    Lactic Acid 3.4 (H) 0.4 - 2.0 mmol/L   Magnesium   Result Value Ref Range    Magnesium 1.7 1.6 - 2.3 mg/dL   Phosphorus   Result Value Ref Range    Phosphorus 1.7 (L) 2.5 - 4.5 mg/dL   INR (AM Draw)   Result Value Ref Range    INR 2.00 (H) 0.86 - 1.14   Glucose by meter   Result Value Ref Range    Glucose 102 (H) 70 - 99 mg/dL   Ammonia   Result Value Ref Range    Ammonia 90 (H) 10 - 50 umol/L   Glucose by meter   Result Value Ref Range    Glucose 114 (H) 70 - 99 mg/dL   UPPER GI ENDOSCOPY   Result Value Ref Range    Upper GI Endoscopy       [facility]  _______________________________________________________________________________  Patient Name: Jon Toribio       Procedure Date: 9/25/2017 12:21 PM  MRN: [id number]                       Account Number: [id number]  YOB: 1955              Admit Type: Inpatient  Age: 62                               Gender: Male  Attending MD: Kota Ospina MD Total Sedation Time:   Instrument Name: 127                    _______________________________________________________________________________     Procedure:                Upper GI endoscopy  Indications:              Melena  Providers:                Kota Ospina MD (Doctor)  Referring MD:               Medicines:                Midazolam 2 mg IV  Complications:            No immediate complications.  _______________________________________________________________________________  Procedure:                Pre-Anesthesia Assessment:                            - Prior to the  procedure,  a History and Physical                             was performed, and patient medications and                             allergies were reviewed. The patient is unable to                             give consent secondary to the patient's altered                             mental status. The risks and benefits of the                             procedure and the sedation options and risks were                             discussed with the patient's son. All questions                             were answered and informed consent was obtained.                             Patient identification and proposed procedure were                             verified by the physician in the procedure room.                             Mental Status Examination: obtunded. Airway                             Examination: orotracheal intubation. Respiratory                             Examination: clear to auscultation. CV Examination:                             normal. Prophylactic Antibiotics: T he patient does                             not require prophylactic antibiotics. Prior                             Anticoagulants: The patient has taken no previous                             anticoagulant or antiplatelet agents. ASA Grade                             Assessment: III - A patient with severe systemic                             disease. After reviewing the risks and benefits,                             the patient was deemed in satisfactory condition to                             undergo the procedure. The anesthesia plan was to                             use moderate sedation / analgesia (conscious                             sedation). Immediately prior to administration of                             medications, the patient was re-assessed for                             adequacy to receive sedatives. The heart rate,                             respiratory rate, oxygen saturations, blood              "                pressure, adequacy of pulmonary ventilation, an d                             response to care were monitored throughout the                             procedure. The physical status of the patient was                             re-assessed after the procedure.                            After obtaining informed consent, the endoscope was                             passed under direct vision. Throughout the                             procedure, the patient's blood pressure, pulse, and                             oxygen saturations were monitored continuously. The                             Olympus Gastroscope Model# GIF-H190, [place]ora #127,                             SN#[id number] was introduced through the mouth, and                             advanced to the second part of duodenum. The upper                             GI endoscopy was accomplished without difficulty.                             The patient tolerated the procedure well.                                                                                   Findings:        LA Grade D (one or more mucosal breaks involving at least 75% of        esophageal circumference) esophagitis with no active bleeding was found        in the lower half of the esophagus, with normal appearing mucosa more        proximally. There was friability, resulting in slight oozing upon        contact. There were no esophageal varices.       A non-bleeding superficial mucosal tear was found in the upper third of        the esophagus. This measured 8 mm in length. Likely iatrogenic [place]        exact etiology unclear. Clinically irrelevant to current picture.       The stomach was normal. No ulcers.       The examined duodenum was normal, although the tissue subjectively did        appear somewhat edematous, there was no evidence of active \"duodenitis\".                                                                                   Impression:              "  - LA Grade D reflux esophagitis with friability.                            - Superficial mucosal tear in the upp er third of                             the esophagus.                            - Normal stomach.                            - Normal examined duodenum.                            - No specimens collected.  Recommendation:           - Continue PPI therapy.                            - Can discontinue Octreotide.                            - Avoid NG tube placement given superficial tear in                             proximal esophagus.                                                                                   Procedure Code(s):       --- Professional ---       33922, Esophagogastroduodenoscopy, flexible, transoral; diagnostic,        including collection of specimen(s) by brushing or washing, when        performed (separate procedure)    CPT copyright 2016 [employer]. All rights reserved.    The codes documented in this report are preliminary and upon  review may   be revised to meet current compliance requirements.  Attending Participation:       I per sonally performed the entire procedure.    Kota Ospina M.D.  ______________________  Kota Ospina MD  9/25/2017 3:06:01 PM  Number of Addenda: 0    Note Initiated On: 9/25/2017 12:21 PM  MRN:                      [id number]  Procedure Date:           9/25/2017 12:21:39 PM  Total Procedure Duration: 0 hours 8 minutes 7 seconds   Estimated Blood Loss:       Scope In: 1:12:21 PM  Scope Out: 1:20:28 PM     Hemoglobin   Result Value Ref Range    Hemoglobin 7.6 (L) 13.3 - 17.7 g/dL   Glucose by meter   Result Value Ref Range    Glucose 148 (H) 70 - 99 mg/dL         Procedures:      Central Line Placement       PROCEDURE:  Central Line Placement with Ultrasound Guidance.    INDICATIONS: Vascular access; vasopressor administration    CONSENT: Risks (including but not limited to: pneumothorax,  bleeding, infection or  artery puncture), benefits and alternatives were discussed with  unable to obtain due to emergency conditions and consent for procedure was obtained.    TIMEOUT: Universal protocol was followed. TIME OUT conducted just prior to starting procedure confirmed patient identity, site/side, procedure, patient position, and availability of correct equipment, and implants.?  Yes      MEDICATION: Lidocaine    PROCEDURAL NOTE: Right Femoral and the right femoral area was prepped, cleansed and draped in a sterile fashion.  Mask, gown and gloves were used per sterile protocol.  Patient was then placed into Trendelenburg position and lidocaine was used for local anesthesia.  Vascular probe with ultrasound was used in a sterile fashion for guidence.  Introducer needle was then used to gain access to the central venous circulation.  Using Seldinger technique the  Triple lumen catheter was placed.  Catheter port(s) were aspirated and flushed.  Central line was sutured in place and sterile dressing applied.      PATIENT STATUS: Patient tolerated the procedure   well. There were  no complications.        [facility] Procedure Note:     PHYSICIAN: Jonn Smith MD  PROCEDURE:  Left radial arterial line  INDICATIONS:  Vascular access,  Arterial BP monitoring  CONSENT:  Consent as emergency procedure.     TIMEOUT:   Universal protocol was followed. A TIME OUT was conducted just prior to starting procedure confirmed patient identity, site/side, procedure, patient position, and availability of correct equipment.  MEDICATION: Local infiltration of lidocaine  DESCRIPTION OF PROCEDURE: The patient's left wrist is prepped with Chloro-prep.  A sterile field is created.  Needle advanced with arterial blood returned in the syringe.  A wire was placed via Seldinger technique.  The arterial catheter is placed over the wire and the wire is removed.  The transducer line is connected and arterial waveform is confirmed. The line is sewn into  "place.  A dressing is applied. There were no complications to the procedure.         Intubation      INDICATION: Airway protection.    PERFORMED BY: Jonn Smith MD    CONSENT: The procedure was performed in an emergent situation.    TIMEOUT: Immediately prior to procedure a \"time out\" was called to verify the correct patient, procedure, equipment, support staff and site/side marked as required.    INTUBATION METHOD: Glidescope    PATIENT STATUS: RSI    PREOXYGENATION: Mask    PRETREATMENT MEDICATIONS: None    SEDATIVES: Etomidate    PARALYTIC: rocuronium    LARYNGOSCOPE SIZE: Glidescope 4    TUBE SIZE: 7.5 cuffed with cuff inflated after placement  Number of attempts: 1  Cricoid pressure: yes  Cords visualized: yes    POST-PROCEDURE ASSESSMENT: Breath sounds equal bilaterally with chest rise and absent over the epigastrium, Chest x-ray interpreted by me demonstrating endotracheal tube in appropriate position and CO2 detector.    Tube secured with: ETT ospina    Patient tolerated the procedure well with no immediate complications.  COMPLICATIONS:  None     [facility] Procedure Note      Limited Bedside ED Cardiac Ultrasound:     PROCEDURE: PERFORMED BY: Dr. Jonn Smith  INDICATIONS/SYMPTOM:  Hypotension/shock  PROBE: Cardiac phased array probe  BODY LOCATION: Chest  FINDINGS:   The ultrasound was performed utilizing the subcostal views.  Cardiac contractility:  Present  Gross estimation of cardiac kinesis: normal  Pericardial Effusion:  None  RV:LV ratio: LV > RV  INTERPRETATION:    Chamber size and motion were grossly normal with LV > RV, normal cardiac kinesis.  No pericardial effusion was found.   IMAGE DOCUMENTATION: Images were archived to hard drive.          Interventions:  1723: NS 1L IV Bolus  1734: Amidate 20 mg IV  1739: Calcium gluconate 1 g IV  1740: Zemuron 60 mg IV  1748: Ativan 2 mg IV  1806: Protonix 8 mg/hr IV  1808: Protonix 80 mg IV  1842: Sandostatin 50 mcg/hr IV  1843: " Octreotide 50 mcg IV  1855: Rocephin 2 mg IV  1909: NS 1L IV Bolus  : Levophed 0.05 mcg/kg/min  : Aqua-Mephyton 10 mg IV  2013: Calcium gluconate 1 g IV    Emergency Department Course:  patient arrived via EMS and a stabilization room  EMS giving report  repeat blood sugar provided and within normal limits.  Istat VBG and chem8 ordered and returned  IV fluid resuscitation begun  IV fluid resuscitation and no profound acidosis thus plan is for endotracheal intubation  Patient intubated without difficulty  Right femoral central line placed  Left radial art line placed  Blood products with plaque bread blood cells, FFP in vitamin K ordered  Consulted with Dr. Ryan of gastroenterology  Discussed the case with the patient's son  Consulted Dr. Choi of hospital medicine team  Patient admitted to ICU      Impression & Plan      CMS Diagnoses: Lactate is greater than 2 due to upper GI bleed, at this time there is no sign of sepsis.       Medical Decision Makin-year-old male presented to the ED in shock, unresponsive and a readily apparent upper G.I.     1. Upper G.I. Bleed:  patient was found to have mellowed on a stool on examination. It is of a history of underlying liver disease increasing his risk of esophageal varices or or or carbonated gastritis/esophagitis. Patient was provided bolus of Artis X, octreotide followed by infusion of these medications. NG tube was placed in which 900 mL of coffee ground emesis was immediately returned. He was found have associated anemia for which he was provided packed red blood cells and FFP with vitamin K for elevated INR and coagulopathy. I spoke with Dr. Ryan of gastroenterology recommended ongoing resuscitation prior to upper endoscopy. During his ED course his status has improved in regards to hemodynamics. NG tube has been less voluminous in which there is dark blood no greater than 200 mL over the last 90 minutes thus he is safe to await upper endoscopy  while undergoing ongoing resuscitation.    2. Shock:  shock is related to # 1. He clearly has hemorrhagic shock as the primary source of his symptoms. He was given IV fluids, packed red blood cells and agents to reverse the underlying process of the upper G.I. bleed. He has no signs pericardial effusion, RV dilatation to cause increased concern for pulmonary embolus and no signs of cardiac dysfunction on bedside US. No obvious infectious symptoms at this time. It should be noted that ceftriaxone was given for the upper G.I. bleed and not for underlying infection or concerns of sepsis this time. CT scan did not reveal any free fluid other than likely ascitic fluid on abdominal CT scan.    3. Encephalopathy:  encephalopathy is likely related to hypoperfusion from global cerebral hypoperfusion related to the upper G.I. bleed. As he was resuscitated, his mental status was lower than would be expected. He has no signs of hyperammonemia. Sodium is 118 which certainly cound account for some of his sedation but as his sodium improved into the 120s, he had no improvement of his mental state thus unlikely it is related to hyponatremia alone. There is no infectious pathology noted. No evidence of intracranial event such as hemorrhage, mass, stroke on CT scan. It is possible that he does have some degree of Wernicke's or  Korsakoff syndrome but is less likely.    4. Hyponatremia:  this is likely secondary to dehydration coupled with chronic alcohol abuse. I considered the use of 3% saline but after he was given initial resuscitative agents with 2 L of normal saline, sodium increased from 118 to 122 - 123. I felt this was an appropriate rise and that further rapid increase may be unsafe.  There has been no seizure activity in the ED but may have occurred at home.  He has a number of other reasons to cause encephalopathy other than hyponatremia thus 3% saline, again not given.    5. Respiratory insufficiency: patient clearly  required intubation for respiratory insufficiency and inability to protect his own airway. He did not undergo immediate intubation out of concerns of underlying acidosis and that an uncorrected acidosis in the face of decreased ventilation during RSI could lead to precipitous decline in pH and cardiovascular collapse. VBG and istat laboratory studies were obtained the bedside. He was resuscitated with IV fluids and reported Venus pH returned so that we may perform intubating circumstances in an appropriate sending to avoid cardiovascular collapse. Patient was intubated without difficulty.    Critical Care time:  was 120 minutes for this patient excluding procedures.    Diagnosis:    ICD-10-CM    1. Acute upper gastrointestinal hemorrhage K92.2 Plasma prepare order unit     Basic metabolic panel (BMP)     Blood component     Blood component     Blood component     Blood component     Hemoglobin   2. Shock (H) R57.9    3. Renal failure, unspecified chronicity N19    4. Hyperkalemia E87.5    5. Hyponatremia E87.1    6. Lactic acidosis E87.2    7. Encephalopathy G93.40    8. Liver failure without hepatic coma, unspecified chronicity (H) K72.90        Disposition:  Admitted to ICU    Jonn Smith  9/24/2017   [facility] INTENSIVE CARE UNIT       Jonn Smith MD  09/25/17 2147

## 2017-09-25 NOTE — CONSULTS
[facility]  Colon and Rectal Surgery Consult Note  Name: Jon Toribio    MRN: [id number]  YOB: 1955    Age: 62 year old  Date of admission: 9/24/2017  Primary care provider: No primary care provider on file.     Requesting Physician:  Dr. Choi  Reason for consult:  Critically ill patient with possible colitis           History of Present Illness:   Jon Toribio is a 62 year old male, seen at the request of Dr. Choi, presents with severe hypoglycemia and shock state as he was found unresponsive by his family after 5 days of heavy alcohol intake. PMHx substance abuse, depression, alcohol withdrawal. Per chart review, pt had been binging, greater than baseline, for the past 5 days. He was found unresponsive and EMS was called.      His blood glucose initially on the field was 39. He was also found to be hypotensive. Pt had elevated lactic acid to 16, now improved to 3.4. He is s/p 2 units RBC, 2 units of FFP. Hgb stable at 7.5. No leukocytosis. He had arterial line placed and is responding to pressors. INR remains elevated at 2.0 s/p reversal.   CAP CT scan with nonspecific peritoneal fluid within the abdomen and pelvis and diffuse bowel wall thickening. No definitive evidence of bowel obstruction or free peritoneal air. Severe hepatic fatty infiltration. Bilateral posterior dependent pulmonary mild atelectasis.     Colonoscopy History:  10/12/2006 with single transverse and two rectal polyps removed.     Surgical History: None per chart review            Past Medical History:     Past Medical History:   Diagnosis Date     Other and unspecified hyperlipidemia      Substance abuse              Past Surgical History:     Past Surgical History:   Procedure Laterality Date     C NONSPECIFIC PROCEDURE      Vasectomy               Social History:     Social History   Substance Use Topics     Smoking status: Current Every Day Smoker     Packs/day: 1.00     Types:  Cigarettes     Smokeless tobacco: Not on file      Comment: 1/2 pack daily     Alcohol use No      Comment: binge drinking for past 2 weeks             Family History:     Family History   Problem Relation Age of Onset     HEART DISEASE Father      Neurologic Disorder Mother      dementia     Dementia Mother      CANCER Brother      throat             Allergies:   No Known Allergies          Medications:       cefTRIAXone  2 g Intravenous Q24H     IV infusion builder WITH LARGE additive list   Intravenous Q24H     insulin aspart  1-4 Units Subcutaneous Q4H             Review of Systems:   A comprehensive greater than 10 system review of systems was carried out.  Pertinent positives and negatives are noted above.  Otherwise negative for contributory info.            Physical Exam:     Blood pressure (!) 76/50, pulse 118, temperature 100.6  F (38.1  C), resp. rate 14, weight 58 kg (127 lb 12.8 oz), SpO2 100 %.    Intake/Output Summary (Last 24 hours) at 09/25/17 0816  Last data filed at 09/25/17 0600   Gross per 24 hour   Intake          2736.79 ml   Output              175 ml   Net          2561.79 ml     EXAM:  GEN:  Intubated, obtunded, does not respond to questions or commands.  appears older than stated age  PULM: Intubated  ABD: Soft, non tender, mildly distended. no rebound or guarding   RECTAL: Rectal exam was deferred  NEURO: CN II-XII grossly intact  MSK: extremeties with no clubbing, cyanosis or edema;   EXT/SKIN: inspection reveals no rashes, lesions or ulcers, normal coloring         Data Reviewed:     Results for orders placed or performed during the hospital encounter of 09/24/17   CT Head w/o Contrast    Narrative    CT SCAN OF THE HEAD WITHOUT CONTRAST   9/24/2017 6:44 PM     HISTORY: Unresponsive.    TECHNIQUE:  Axial images of the head and coronal reformations without  IV contrast material.  Radiation dose for this scan was reduced using  automated exposure control, adjustment of the mA and/or kV  according  to patient size, or iterative reconstruction technique.    COMPARISON: None.    FINDINGS: There is some mild cerebral atrophy. Minimal patchy white  matter changes are present in both hemispheres without mass effect.  There is no evidence for intracranial hemorrhage, mass effect, acute  infarct, or skull fracture. Visualized paranasal sinuses and mastoid  air cells are clear. Vascular calcifications noted.      Impression    IMPRESSION: Chronic changes. No evidence for intracranial hemorrhage  or any acute process.    CHERRY MADDOX MD   XR Chest Port 1 View    Narrative    CHEST PORTABLE ONE VIEW   9/24/2017 6:01 PM     HISTORY: Unresponsive.    COMPARISON: None.      Impression    IMPRESSION: ET tube and nasogastric tube in good position. Heart size  and pulmonary vascular normal. Lungs are clear.    CHERRY MADDOX MD   Cervical spine CT w/o contrast    Narrative    CT CERVICAL SPINE WITHOUT CONTRAST   9/24/2017 6:44 PM     HISTORY: Unresponsive.     TECHNIQUE: Axial images of the cervical spine were obtained without  intravenous contrast. Multiplanar reformations were performed.  Radiation dose for this scan was reduced using automated exposure  control, adjustment of the mA and/or kV according to patient size, or  iterative reconstruction technique.     COMPARISON: None.    FINDINGS: Sagittal reconstructions demonstrate minimal degenerative  spondylolisthesis of C4 on C5 measuring approximately 2 to 3 mm. Disc  space narrowing is present at C5-C6. There is no evidence for any  acute fracture.    C1-C2: Degenerative changes are present. There is no stenosis.    C2-C3: Left-sided uncinate spurring and minimal facet hypertrophy is  present. There is no stenosis.    C3-C4: Moderate left-sided facet hypertrophy and posterior osteophyte  formation is present. There is also some left-sided uncinate spurring.  There is moderate left-sided foraminal stenosis and mild central canal  stenosis.    C4-C5: Uncinate  spurring and facet hypertrophy is present on the left  greater than right causing some moderate left-sided foraminal  stenosis. There is also mild central canal stenosis due to broad-based  disc bulging and some minimal spondylolisthesis.    C5-6: Broad-based posterior osteophyte formation and facet hypertrophy  is present causing moderate central canal stenosis and moderate to  severe bilateral neural foraminal stenosis.    C6-7: Posterior osteophyte formation, disc bulge and facet hypertrophy  is present causing mild to moderate bilateral neural foraminal  stenosis and mild central canal stenosis.    C7-T1: Normal.      Impression    IMPRESSION:  1. Grade 1 degenerative spondylolisthesis at C4-C5.  2. Multilevel degenerative disc and facet disease most advanced at  C5-C6 where there is moderate central canal stenosis and moderate to  severe bilateral neural foraminal stenosis.  3. No evidence for fracture.    CHERRY MADDOX MD   CT Chest Abdomen Pelvis w/o Contrast    Narrative    CT CHEST, ABDOMEN, PELVIS WITHOUT CONTRAST 9/24/2017 6:45 PM     HISTORY: Unresponsive.    Radiation dose for this scan was reduced using automated exposure  control, adjustment of the mA and/or kV according to patient size, or  iterative reconstruction technique.    FINDINGS:   Chest: Endotracheal tube tip is approximately 4 cm from the ailyn  midway between the clavicles and ailyn. Nasogastric tube extends into  the stomach. The lungs are grossly clear with only mild posterior  dependent atelectasis at the lung bases. No pleural effusion or  pneumothorax is demonstrated. The mediastinum and josselyn are grossly  unremarkable for the unenhanced technique.    Abdomen/pelvis: There is marked hepatic fatty infiltration. Contents  of the gallbladder are relatively radiodense measuring approximately  30 HU. Mild to moderate peritoneal fluid is noted within the pelvis,  paracolic gutters, and adjacent to the spleen. No renal stones  are  demonstrated. There is no hydronephrosis. The bowel is not well  evaluated without contrast enhancement. Diffuse small bowel wall  thickening and colonic wall thickening may be present. Sigmoid  diverticulosis is also noted. A Street catheter is present within the  urinary bladder which is relatively collapsed.      Impression    IMPRESSION:  1. Nonspecific peritoneal fluid within the abdomen and pelvis.  2. Evaluation of the bowel is suboptimal due to lack of contrast  enhancement. Diffuse bowel wall thickening may be present. There is no  definitive evidence of bowel obstruction. No free peritoneal air.  3. Severe hepatic fatty infiltration.  4. Bilateral posterior dependent pulmonary mild atelectasis.    RAMIN GUILLORY MD         Recent Labs  Lab 09/25/17  0600 09/24/17  2330 09/24/17  2230 09/24/17  1716   WBC 8.9  --  10.6  --  15.6*   HGB 7.5* 7.7* 8.0*  < > 6.0*   HCT 22.4*  --  23.5*  --  18.4*   MCV 96  --  97  --  105*   PLT 91*  --  85*  --  144*   < > = values in this interval not displayed.    Recent Labs  Lab 09/25/17  0600 09/24/17  2230 09/24/17  1906  09/24/17  1716   * 124* 123*  < > 120*   POTASSIUM 4.3 4.9 4.9  < > 5.5*   CHLORIDE 95 89* 88*  < > 81*   CO2 22 18* 17*  --  16*   ANIONGAP 10 17* 18*  < > 23*   * 89 108*  < > 122*   BUN 42* 40* 42*  < > 44*   CR 1.68* 2.15* 2.34*  --  2.63*   GFRESTIMATED 42* 31* 28*  < > 25*   GFRESTBLACK 50* 38* 34*  < > 30*   HILTON 5.7* 5.9* 5.7*  --  6.0*   MAG  --  1.7  --   --  2.1   PHOS  --  3.0  --   --   --    PROTTOTAL 4.5*  --   --   --  4.7*   ALBUMIN 1.8*  --   --   --  1.8*   BILITOTAL 8.3*  --   --   --  8.7*   ALKPHOS 159*  --   --   --  195*   *  --   --   --  316*   ALT 42  --   --   --  46   < > = values in this interval not displayed.    Recent Labs  Lab 09/25/17 0625 09/24/17 2230 09/24/17  1716   INR 2.00* 2.10* 2.64*       Recent Labs  Lab 09/25/17  0600 09/24/17 2230 09/24/17  1800   LACT 3.4* 8.7* 12.8*        Recent Labs  Lab 09/24/17  2141   COLOR Tonya   APPEARANCE Cloudy   URINEGLC 50*   URINEBILI Moderate*   URINEKETONE Negative   SG 1.018   UBLD Small*   URINEPH 5.0   PROTEIN 30*   NITRITE Negative   LEUKEST Negative   RBCU 6*   WBCU 11*         Assessment and Plan:   Jon Toribio is a 62 year old male presents after he was found unresponsive by family and EMS called, found to have severe hypoglycemia and in shock state after 5 days of heavy alcohol intake. PMHx substance abuse, depression, alcohol withdrawal.     His blood glucose initially on the field was 39. He was also found to be hypotensive. Pt had elevated lactic acid to 16, now improved to 3.4. He is s/p 2 units RBC, 2 units of FFP. Hgb stable at 7.5. No leukocytosis. He had arterial line placed and is responding to pressors. INR remains elevated at 2.0 s/p reversal. CAP CT scan with nonspecific peritoneal fluid within the abdomen and pelvis and diffuse bowel wall thickening. No definitive evidence of bowel obstruction or free peritoneal air. Severe hepatic fatty infiltration. Bilateral posterior dependent pulmonary mild atelectasis. He continues with low grade fever and tachycardia to 100-110s.    Plan:  1. Admit to ICU  2. Surgery: No emergent surgery indicated. Diffuse small bowel thickening on imaging most likely related to global hypoproteinemia and general anasarca. Appreciate intensivist management of medical comorbidities. Pt poor surgical candidate in setting of hypercoagulable state, electrolyte disturbance, multiple comorbidities. Will continue to follow peripherally and be available for questions.   3. Diet: NPO  4. IV Fluids: As ordered  5. Antibiotics:  Not indicated from CRS standpoint  6. Medications: Per intensivist  7. I&O s:  strict I&O s  8. Labs:   - Reviewed: by myself  9. Imaging:   - Dr. Looney and myself have personally viewed: CT abd/pelvis  10. DVT prophylaxis: SCD s  11. This plan has been discussed with   Aure    Patient specific identified risk factors considered as part of today s evaluation include: substance abuse, global hypoproteinemia, depression, electrolyte disturbance.     Additional history obtained from chart review, nurse.  Time spent on consultation: 30minutes, greater than 50 percent of the total encounter time is spent in counseling and/or coordination of care          Palmira Saldaña PA-C  [facility]  Phone:  [phone]

## 2017-09-25 NOTE — PLAN OF CARE
Problem: Restraint for Non-Violent/Non-Self-Destructive Behavior  Goal: Prevent/Manage Potential Problems  Maintain safety of patient and others during period of restraint.  Promote psychological and physical wellbeing.  Prevent injury to skin and involved body parts.  Promote nutrition, hydration, and elimination.   Outcome: No Change  Skin intact   Pulls at lines occasionally     Problem: Individualization  Goal: Patient Preferences  Outcome: No Change  Neuro: obtunded, no sedation, PERRLA, withdraws to pain   Card: labile bp, titrate NE, 1.5 L bolus, 4 L in ED, pulses wnl in all extremities, st  Resp: vent, vc/ac, tv 400, peep 5, rr 14, fio2 40; no secretions, lungs clear   GI: OG, brown output, minimal  : johnston, dark jamarcus u, 10ml/hr, was leaking minimally, OK now   Endo: bg wnl  Skin: cachectic, fragile, elbows, coccyx, and mid back rash, pre-cautionary coccyx dressing applied turn side to side q 2 hrs   Pain: CHRISTOPHER, appears calm comfortable   IV: 3 lumen r groin, bue piv, l art line

## 2017-09-25 NOTE — PROGRESS NOTES
Patient moving head, bitting on ett. Patient moving both upper extremities. Patient not following verbal commands.  Kely Montana

## 2017-09-25 NOTE — H&P
[facility]  Hospitalist Admission Note  Name: Jon Toribio    MRN: [id number]  YOB: 1955    Age: 62 year old  Date of admission: 9/24/2017  Primary care provider: No primary care provider on file.    Chief Complaint:  shock    Jon Toribio is a 62 year old male with PMH including substance abuse, depression, alcohol withdrawal who presents with severe hypoglycemia and shock state.  He was found unresponsive by family today after binging on vodka (more than usual) the past five days or so.  There is no hx of known abdominal pain or other recent symptoms other than heavy alcohol use and lack of food intake.  EMS was called, blood sugar was 39 in the field.  He was breathing but not responsive found to be in a shock state with profound lactic acidosis and suspected acute upper GI bleeding as he had brinda melena in the ER and coffee ground material suctioned out of his stomach.  He now is on pressors with central line, arterial line, IV protonix/octreotide/ceftriaxone and has had his INR reversed.  He is s/p transfusion 2 U RBCs.  He has PENNY which is slowly improving.      Assessment and Plan:   1. Shock state with acute GI bleeding: As above.  Recent history of heavy alcohol abuse and melena in the ER with hgb ~8 grams below his known baseline suggests acute blood loss is playing a large role here.  There is no known hx of co-ingestion otherwise.  He'll be admitted to the ICU for ongoing cares as noted below.  His lactic acid is slowly improving (from 16 to 12.8) and we'll monitor this closely.  He has some free fluid in his abdomen which I suspect is ascites though non-contrast CT scan of his abdomen suggests some bowel thickening.  In the absence of any recent abdominal pain reported to family gut ischemia seems to be an unlikely cause of his shock but warrants evaluation by colorectal surgery.      --has radial arterial line and central line  --s/p 2 U RBCs, 2 U FFP and 3L IVF  boluses.  --continue D5 0.45 NS at 125 cc/hr, will need to change to NS if sodium trends down at all  --Norepinephrine drip  --admit to the ICU  --serial bmp, hgb, lactic acid  --follow up on blood cultures.  --Dr. Ryan of  was contacted, aware of the patient.  Formal consult ordered.    --continue protonix drip, octreotide drip, empiric ceftriaxone given hx of etoh abuse (though no clear hx of cirrhosis)    2.   Hx of alcohol abuse and withdrawal: His son reported he has been drinking vodka a lot the past five days.  His alcohol level is currently negative but he is at very high risk for withdrawal.  We'll have CIWA protocol ordered as well as IV vitamins.    3.   Severe lactic acidosis: 16.0 with gradual improvement to 12.8.  Most likely this is hypoperfusion from AMS and GI bleeding but await blood cultures results and monitor this closely.  GIven etoh abuse he may have impaired clearance of lactate.  His blood sugar has normalized and I would consider metformin ingestion given how high his lactic acid is though given improvement with fluids and blood products this seems most likely related to hemodynamic compromise/hypoperfusion.    4.   Hypoglycemia: to 39 in the field.  Currently resolved.  May be due to impaired gluconeogenesis from etoh abuse.  No clear hx of other ingestion but if hypoglycemia persists would need to consider other etiologies.  I will check a random cortisol to evaluate for adrenal insufficiency.    5.   Encephalopathy: He was intubated for airway protection.  CT head was negative.  Likely in part due to shock state and also hypoglycemia.  Continue to treat as above and await clearing.  His pupils are responsive and he is coughing on his ET tube when off sedation.  Could have an anoxic brain injury as well given hypotension/time down.    6.  Coagulopathy: INR is 2.64.  Unclear if this is fixed from liver disease or nutritional.  he is s/p 2 U FFP and 10 mg IV vitamin K.  We'll trend his  INR.    7.  Hyponatremia: monitor.  Has risen fairly slowly from 120 on admission to 123 now.  Trend, if able try to avoid overly rapid correction.  --he is s/p 3 L isotonic fluid in the ER (NS), will provide 0.45 NS as maintenance for now to avoid over correction.    8.  Mention of possible diffuse bowel wall thickening on non-contrast abd CT: consider ischemic insult to his gut as a cause of bleeding though this seems less likely given recent very heavy drinking and as blood was aspirated from his stomach suggestive of an upper GI/stomach source primarily.  Will treat supportively, recheck WBC and lactate.  Given elevated WBC, lactate and shock state I will have colorectal surgery review his case as well in consultation.  Renal failure right now precludes CT w/ contrast but it is possible that by morning this may be obtained if he has further improvement in his renal function.    9.  PENNY: suspect pre-renal.  Will check a CK and urine for completeness.  Improving with IVF/blood products.        DVT Prophylaxis: Pneumatic Compression Devices  Code Status: Full Code  Discharge Dispo: admit to the ICU      History of Present Illness:  Jon Toribio is a 62 year old male with PMH including substance abuse, depression, alcohol withdrawal who presents with severe hypoglycemia and shock state.  He was found unresponsive by family today.  EMS was called, blood sugar was 39 in the field.  He was breathing but not responsive.    Here in the ER he was hypotensive to the 70s and tachycardic to the 120s.  Lab workup revealed numerous abnormalities including hyponatremia to 120, K of 5.5, bicarb of 16, PENNY with cr of 2.63 and BUN of 44 with anion gap of 23.  Lactate was 16.0.  LFTs were abnormal.  Hgb ws noted to be 6.0 (baseline in 2007 was 14.5).  WBC was 15.6.  INR was elevated to 2.64 (not on coumadin).  Ethanol level was noted to be negative as was tylenol and ASA level.    He was managed with 3L NS boluses, IV calcium  gluconate, IV vitamin K 10 mg, transfusion of 2 U RBCs, and started on IV protonix.  He was intubated for airway protection with central line placement and started on a levophed drip.  He was also given empiric ceftriaxone and octreotide.       Past Medical History:  Past Medical History:   Diagnosis Date     Other and unspecified hyperlipidemia      Substance abuse      Past Surgical History:  Past Surgical History:   Procedure Laterality Date     C NONSPECIFIC PROCEDURE      Vasectomy     Social History:  Social History   Substance Use Topics     Smoking status: Current Every Day Smoker     Packs/day: 1.00     Types: Cigarettes     Smokeless tobacco: Not on file      Comment: 1/2 pack daily     Alcohol use No      Comment: binge drinking for past 2 weeks     Social History     Social History Narrative     Family History:  Family History   Problem Relation Age of Onset     HEART DISEASE Father      Neurologic Disorder Mother      dementia     Dementia Mother      CANCER Brother      throat     Allergies:  No Known Allergies  Medications:  None         Review of Systems:  A Comprehensive greater than 10 system review of systems was carried out.  Pertinent positives and negatives are noted above.  Otherwise negative for contributory information.     Physical Exam:  Blood pressure 131/76, pulse 118, temperature 95.9  F (35.5  C), resp. rate 27, weight 63.6 kg (140 lb 3.4 oz), SpO2 100 %.  Wt Readings from Last 1 Encounters:   09/24/17 63.6 kg (140 lb 3.4 oz)     Exam:  General: intubated, sedated. Thin man looks cachectic and older than his stated age.  HEENT: ET tube in place, face symmetric.   Cardiac: tachycardic RR, S1, S2.  No murmurs appreciated.  Pulmonary: Normal chest rise, ventilated via mechanical vent.    Abdomen: soft, non-distended.  Bowel Sounds Present  Extremities: thin, scattered bruises.  no deformities.  Warm, well perfused.  Skin:  Warm and Dry.  Neuro: sedated.    Data:  EKG:  Sinus  tachycardia.  Imaging:  Recent Results (from the past 48 hour(s))   XR Chest Port 1 View    Narrative    CHEST PORTABLE ONE VIEW   9/24/2017 6:01 PM     HISTORY: Unresponsive.    COMPARISON: None.      Impression    IMPRESSION: ET tube and nasogastric tube in good position. Heart size  and pulmonary vascular normal. Lungs are clear.    CHERRY MADDOX MD   CT Head w/o Contrast    Narrative    CT SCAN OF THE HEAD WITHOUT CONTRAST   9/24/2017 6:44 PM     HISTORY: Unresponsive.    TECHNIQUE:  Axial images of the head and coronal reformations without  IV contrast material.  Radiation dose for this scan was reduced using  automated exposure control, adjustment of the mA and/or kV according  to patient size, or iterative reconstruction technique.    COMPARISON: None.    FINDINGS: There is some mild cerebral atrophy. Minimal patchy white  matter changes are present in both hemispheres without mass effect.  There is no evidence for intracranial hemorrhage, mass effect, acute  infarct, or skull fracture. Visualized paranasal sinuses and mastoid  air cells are clear. Vascular calcifications noted.      Impression    IMPRESSION: Chronic changes. No evidence for intracranial hemorrhage  or any acute process.    CHERRY MADDOX MD   Cervical spine CT w/o contrast    Narrative    CT CERVICAL SPINE WITHOUT CONTRAST   9/24/2017 6:44 PM     HISTORY: Unresponsive.     TECHNIQUE: Axial images of the cervical spine were obtained without  intravenous contrast. Multiplanar reformations were performed.  Radiation dose for this scan was reduced using automated exposure  control, adjustment of the mA and/or kV according to patient size, or  iterative reconstruction technique.     COMPARISON: None.    FINDINGS: Sagittal reconstructions demonstrate minimal degenerative  spondylolisthesis of C4 on C5 measuring approximately 2 to 3 mm. Disc  space narrowing is present at C5-C6. There is no evidence for any  acute fracture.    C1-C2: Degenerative  changes are present. There is no stenosis.    C2-C3: Left-sided uncinate spurring and minimal facet hypertrophy is  present. There is no stenosis.    C3-C4: Moderate left-sided facet hypertrophy and posterior osteophyte  formation is present. There is also some left-sided uncinate spurring.  There is moderate left-sided foraminal stenosis and mild central canal  stenosis.    C4-C5: Uncinate spurring and facet hypertrophy is present on the left  greater than right causing some moderate left-sided foraminal  stenosis. There is also mild central canal stenosis due to broad-based  disc bulging and some minimal spondylolisthesis.    C5-6: Broad-based posterior osteophyte formation and facet hypertrophy  is present causing moderate central canal stenosis and moderate to  severe bilateral neural foraminal stenosis.    C6-7: Posterior osteophyte formation, disc bulge and facet hypertrophy  is present causing mild to moderate bilateral neural foraminal  stenosis and mild central canal stenosis.    C7-T1: Normal.      Impression    IMPRESSION:  1. Grade 1 degenerative spondylolisthesis at C4-C5.  2. Multilevel degenerative disc and facet disease most advanced at  C5-C6 where there is moderate central canal stenosis and moderate to  severe bilateral neural foraminal stenosis.  3. No evidence for fracture.    CHERRY MADDOX MD   CT Chest Abdomen Pelvis w/o Contrast    Narrative    CT CHEST, ABDOMEN, PELVIS WITHOUT CONTRAST 9/24/2017 6:45 PM     HISTORY: Unresponsive.    Radiation dose for this scan was reduced using automated exposure  control, adjustment of the mA and/or kV according to patient size, or  iterative reconstruction technique.    FINDINGS:   Chest: Endotracheal tube tip is approximately 4 cm from the ailyn  midway between the clavicles and ailyn. Nasogastric tube extends into  the stomach. The lungs are grossly clear with only mild posterior  dependent atelectasis at the lung bases. No pleural effusion  or  pneumothorax is demonstrated. The mediastinum and josselyn are grossly  unremarkable for the unenhanced technique.    Abdomen/pelvis: There is marked hepatic fatty infiltration. Contents  of the gallbladder are relatively radiodense measuring approximately  30 HU. Mild to moderate peritoneal fluid is noted within the pelvis,  paracolic gutters, and adjacent to the spleen. No renal stones are  demonstrated. There is no hydronephrosis. The bowel is not well  evaluated without contrast enhancement. Diffuse small bowel wall  thickening and colonic wall thickening may be present. Sigmoid  diverticulosis is also noted. A Street catheter is present within the  urinary bladder which is relatively collapsed.      Impression    IMPRESSION:  1. Nonspecific peritoneal fluid within the abdomen and pelvis.  2. Evaluation of the bowel is suboptimal due to lack of contrast  enhancement. Diffuse bowel wall thickening may be present. There is no  definitive evidence of bowel obstruction. No free peritoneal air.  3. Severe hepatic fatty infiltration.  4. Bilateral posterior dependent pulmonary mild atelectasis.     Labs:    Recent Labs  Lab 09/24/17  1812 09/24/17  1759 09/24/17  1718 09/24/17  1716   WBC  --   --   --  15.6*   HGB 8.4* 8.5* 8.8* 6.0*   HCT  --   --   --  18.4*   MCV  --   --   --  105*   PLT  --   --   --  144*          Lab Results   Component Value Date     09/24/2017     09/24/2017     09/24/2017    Lab Results   Component Value Date    CHLORIDE 88 09/24/2017    CHLORIDE 86 09/24/2017    CHLORIDE 81 09/24/2017    Lab Results   Component Value Date    BUN 42 09/24/2017    BUN 41 09/24/2017    BUN 43 09/24/2017      Lab Results   Component Value Date    POTASSIUM 4.9 09/24/2017    POTASSIUM 5.0 09/24/2017    POTASSIUM 5.5 09/24/2017    Lab Results   Component Value Date    CO2 17 09/24/2017    CO2 16 09/24/2017    CO2 30 12/09/2014    Lab Results   Component Value Date    CR 2.34 09/24/2017    CR  2.63 09/24/2017    CR 0.68 12/09/2014          Recent Labs  Lab 09/24/17  1716   INR 2.64*           Chris Choi MD  Hospitalist  [facility]

## 2017-09-25 NOTE — CONSULTS
GASTROENTEROLOGY CONSULTATION      Jon Toribio  [id number] [address] [id number]  62 year old male     Admission Date/Time: 9/24/2017  Primary Care Provider: No primary care provider on file.     We were asked to see the patient in consultation by Dr. Choi for evaluation of melena and hematemesis.    CC: severe hypoglycemia/shock     HPI:  Jon Toribio is a 62 year old male with past medical history significant for substance abuse, depression, alcohol withdrawal presenting to the hospital with severe hypoglycemia and shock state. Patient not able to provide history, obtained from EHR. Patient was found unresponsive by family 9/24 after binge drinking on vodka over the past 5 days approximately. On EMS arrival patient had blood glucose 39 and in shock with profound lactic acidosis. He reportedly had brinda melena and coffee ground material suctioned from his stomach in the ER. He was placed on octreotide, Protonix drips and ceftriaxone. On review of prior imaging, CT noted severe fatty infiltration of the liver but no clear evidence of liver cirrhosis or liver lesions, or varices. On admission, HGB noted to be 6 with platelets low 144. INR 2.64. Total bilirubin 8.7. Alk phos 195, normal ALT 46, elevated , and elevated creatinine 2.63. Lactic acid 16. He has low grade fever since admission. I do not see a prior history of EGD. No NSAIDs, diuretics, or PPIs listed on PTA meds. He is currently requiring pressor support and is intubated for airway protection. He has received ~4 units of blood. HGB improved to 8.8, now trending down to 7.5. No signs of ongoing bleeding since admission to ICU.     CT chest/abd/pelvis (noncontrast) noted possible diffuse bowel thickening although difficult to assess with no contrast.        PAST MEDICAL HISTORY:  Patient Active Problem List    Diagnosis Date Noted     GI hemorrhage 09/24/2017     Priority: Medium     Hyperlipidemia      Priority: Medium      Problem list name updated by automated process. Provider to review            ROS: A comprehensive ten point review of systems was negative aside from those in mentioned in the HPI.       MEDICATIONS:   No current outpatient prescriptions on file.        ALLERGIES: No Known Allergies     SOCIAL HISTORY:  Social History   Substance Use Topics     Smoking status: Current Every Day Smoker     Packs/day: 1.00     Types: Cigarettes     Smokeless tobacco: Not on file      Comment: 1/2 pack daily     Alcohol use No      Comment: binge drinking for past 2 weeks        FAMILY HISTORY:  Family History   Problem Relation Age of Onset     HEART DISEASE Father      Neurologic Disorder Mother      dementia     Dementia Mother      CANCER Brother      throat        PHYSICAL EXAM:   BP 97/58 (BP Location: Right arm)  Pulse 118  Temp 100.4  F (38  C)  Resp 30  Wt 58 kg (127 lb 12.8 oz)  SpO2 99%     PHYSICAL EXAM:  General: intubated, nonresponsive (not sedated)  SKIN: +jaundice, +spider angiomas  HEAD: Normocephalic. No masses, lesions, tenderness or abnormalities  NECK: Neck supple. No adenopathy. Thyroid symmetric, normal size.  EYES: No scleral icterus  ENT: ENT exam normal, no neck nodes or sinus tenderness  RESPIRATORY: negative, Good diaphragmatic excursion. Lungs clear  CARDIOVASCULAR: negative, PMI normal. No lifts, heaves, or thrills. RRR. No murmurs, clicks gallops or rub  GASTROINTESTINAL: +BS, soft, NT, ND, no HSM, no masses/guarding/rebound  JOINT/EXTREMITIES: extremities normal- no gross deformities noted, and normal muscle tone  NEURO: minimally responsive  LYMPH: No anterior cervical, posterior cervical, or supraclavicular adenopathy     LABS:  I reviewed the patient's new clinical lab test results.   Recent Labs   Lab Test  09/25/17   0625  09/25/17   0600  09/24/17   2330  09/24/17   2230   09/24/17   1716   WBC   --   8.9   --   10.6   --   15.6*   HGB   --   7.5*  7.7*  8.0*   < >  6.0*   MCV   --   96    --   97   --   105*   PLT   --   91*   --   85*   --   144*   INR  2.00*   --    --   2.10*   --   2.64*    < > = values in this interval not displayed.     Recent Labs   Lab Test  09/25/17   0600  09/24/17   2230  09/24/17   1906   NA  127*  124*  123*   POTASSIUM  4.3  4.9  4.9   CHLORIDE  95  89*  88*   CO2  22  18*  17*   BUN  42*  40*  42*   ANIONGAP  10  17*  18*   HILTON  5.7*  5.9*  5.7*     Recent Labs   Lab Test  09/25/17   0600  09/24/17   2141  09/24/17   1716   ALBUMIN  1.8*   --   1.8*   BILITOTAL  8.3*   --   8.7*   ALT  42   --   46   AST  249*   --   316*   ALKPHOS  159*   --   195*   PROTEIN   --   30*   --         IMAGING  I personally reviewed the patient's new imaging results.    CT CHEST, ABDOMEN, PELVIS WITHOUT CONTRAST 9/24/2017 6:45 PM      HISTORY: Unresponsive.     Radiation dose for this scan was reduced using automated exposure  control, adjustment of the mA and/or kV according to patient size, or  iterative reconstruction technique.     FINDINGS:   Chest: Endotracheal tube tip is approximately 4 cm from the ailyn  midway between the clavicles and ailyn. Nasogastric tube extends into  the stomach. The lungs are grossly clear with only mild posterior  dependent atelectasis at the lung bases. No pleural effusion or  pneumothorax is demonstrated. The mediastinum and josselyn are grossly  unremarkable for the unenhanced technique.     Abdomen/pelvis: There is marked hepatic fatty infiltration. Contents  of the gallbladder are relatively radiodense measuring approximately  30 HU. Mild to moderate peritoneal fluid is noted within the pelvis,  paracolic gutters, and adjacent to the spleen. No renal stones are  demonstrated. There is no hydronephrosis. The bowel is not well  evaluated without contrast enhancement. Diffuse small bowel wall  thickening and colonic wall thickening may be present. Sigmoid  diverticulosis is also noted. A Street catheter is present within the  urinary bladder which is  relatively collapsed.         IMPRESSION:  1. Nonspecific peritoneal fluid within the abdomen and pelvis.  2. Evaluation of the bowel is suboptimal due to lack of contrast  enhancement. Diffuse bowel wall thickening may be present. There is no  definitive evidence of bowel obstruction. No free peritoneal air.  3. Severe hepatic fatty infiltration.  4. Bilateral posterior dependent pulmonary mild atelectasis.     CONSULTATION ASSESSMENT AND PLAN:    62 year old male with history of alcohol abuse presenting to the hospital in hypoglycemic shock in setting of binge drinking episode with evidence of brinda melena and concern for hematemesis in the ER with noted anemia, HGB 6, and acute renal insufficiency. He has required intubation and pressor support. Nurse reports he received 6 L of fluid. Currently, appears to have stabilized without ongoing signs of brinda GIB. I do not see any prior evidence of liver cirrhosis and CT imaging does not indicate this although he does have biochemical evidence of chronic liver disease with elevated INR and thrombocytopenia. He has noted ETOH hepatitis. Steroids contraindicated in setting of GIB. Will plan on EGD in ICU to further assess. Continue PPI, octreotide drips. Serial HGB and transfuse prn. Continue ceftriaxone for SBP prophylaxis x 7 days. Monitor LFTs, creatinine, INR. I will review with Dr. Ospina when able.     Thank you for asking us to participate in the care of this patient.    Kika Bullard PA-C  [facility]

## 2017-09-25 NOTE — PLAN OF CARE
Problem: Restraint for Non-Violent/Non-Self-Destructive Behavior  Goal: Prevent/Manage Potential Problems  Maintain safety of patient and others during period of restraint.  Promote psychological and physical wellbeing.  Prevent injury to skin and involved body parts.  Promote nutrition, hydration, and elimination.   Bilateral soft wrist restraints ordered and placed on pt to maintain safety of pt.

## 2017-09-25 NOTE — PROGRESS NOTES
[facility]  Hospitalist Progress Note  Nafisa Hernandez MD 09/25/2017    Reason for Stay (Diagnosis): Shock         Assessment and Plan:      Summary of Stay: Jon Toribio is a 62 year old male with a history of etoh use/dependence with hx of withdrawal, with increasing drinking-best guess is about 1 L per day who was found at home by his son coherent calling him by his aunt's name.   He then became unresponsive and so son started CPR and called EMS.  When EMS got there he was noted to be hypoglycemic at 39.  He was brought into the ER and found to be in shock with hypotension and lactic acidosis of 16.  He was intubated for airway protection in the setting of ongoing obtundation.   He had brinda melena in the ER and suspected to be having an UGIB.  He was empirically placed on octreotide and ceftriaxone due to concern for varices , PPI gtt for ulcer treatment, and underwent EGD 9/25 and found to have profound esophagitis.      Of grave concern is that he is intubated for airway protection and has not required any sedation.  During EGD though he was noted diaz the tube      Problem List:   1. obtundation:  No need for sedation despite intubation/mechanical ventilation-NCHCT - no acute findings,may need to consider MRI/and or neuro consultation  2. Shock 2/2 UGI Bleed/underlying pvgslhovg-bgdkfhyi-jgts with norepi-vaso added  3. ABL anemia:  Rec'd 2 U PRBCs thus far-cont to monitor-transfuse < 7.0  4. UGI Bleed:  Apparently 2/2 to esophagitis-discussed with KARMEN LOWE-looks due to reflux-no e/o fungal infection.  Cont PPI  5. Acute on chronic hepatitis:  With elevated bili in 8 range.  ALT wnl, and ->249.  Alk phos only minimally elevated. Liver shows fatty infiltration on CT but ascites is present.  I suspect some degree of cirrhosis.  INR elevated 2.0 range.  DF is > 100-so will trial prednisolone 40 mg per day x 28.  Ammonia on admit wnl at 12, and now measured at 90-so will also start  lactulose-unfortunately not able to give via NG as that pulled due to severe esophagitis.  Therefore will require lactulose enemas  6. PENNY:  Looks all prerenal-cont with IVF-but will likely 3rd space in setting of hypoalbuminemia, cirrhosis  DVT Prophylaxis: Pneumatic Compression Devices  Code Status: Full Code  Discharge Dispo: unclear   Estimated Disch Date / # of Days until Disch: unclear        Interval History (Subjective):      Chart thoroughly reviewed-discussed care with admitting MD/intesnivist/RN    Overnight no need for sedation despite intubation/mechanical ventilation.  Tolerated transfusions.                    Physical Exam:      Last Vital Signs:  /59  Pulse 118  Temp 100.6  F (38.1  C)  Resp 22  Wt 58 kg (127 lb 12.8 oz)  SpO2 99%    I/O:  Up 2 L  UO: 175 overnight, 600 thus far today    Intubated NOT sedated, no response to verbal or physical stimuli.  Lungs ctab nl effort RRR no m/r/g no le edema, abd distended no obvious tenderness. Minimal spontaneous movement, skin w/d, visibly jaundiced-urine dark in color           Medications:      All current medications were reviewed with changes reflected in problem list.         Data:      All new lab and imaging data was reviewed.   Labs:    Recent Labs  Lab 09/25/17  0600   *   POTASSIUM 4.3   CHLORIDE 95   CO2 22   ANIONGAP 10   *   BUN 42*   CR 1.68*   GFRESTIMATED 42*   GFRESTBLACK 50*   HILTON 5.7*       Recent Labs  Lab 09/25/17  1405 09/25/17  0600   WBC  --  8.9   HGB 7.6* 7.5*   HCT  --  22.4*   MCV  --  96   PLT  --  91*       Recent Labs  Lab 09/25/17  1150 09/25/17  0600 09/24/17  1716   AST  --  249* 316*   ALT  --  42 46   ALKPHOS  --  159* 195*   BILITOTAL  --  8.3* 8.7*   TEJA 90*  --  12       Recent Labs  Lab 09/25/17  0625 09/24/17  2230 09/24/17  1716   INR 2.00* 2.10* 2.64*      Imaging:

## 2017-09-25 NOTE — PROGRESS NOTES
Pre-Endoscopy History and Physical     Jon Toribio MRN# [id number]   YOB: 1955 Age: 62 year old     Date of Procedure: 9/24/2017  Primary care provider: No primary care provider on file.  Type of Endoscopy: esophagogastroduodenoscopy (upper GI endoscopy)  Reason for Procedure: melena  Type of Anesthesia Anticipated: Moderate (conscious) sedation    HPI:    Jon is a 62 year old male who will be undergoing the above procedure.      A history and physical has been performed. The patient's medications and allergies have been reviewed. The risks and benefits of the procedure and the sedation options and risks were discussed with the patient.  All questions were answered and informed consent was obtained.      He denies a personal or family history of anesthesia complications or bleeding disorders.     No Known Allergies     Current Facility-Administered Medications   Medication     calcium gluconate 1 g in D5W 100 mL infusion     0.9% sodium chloride infusion     0.9% sodium chloride infusion     midazolam (VERSED) injection 1-2 mg    Followed by     midazolam (VERSED) injection 1 mg     fentaNYL (PF) (SUBLIMAZE) injection  mcg    Followed by     fentaNYL (PF) (SUBLIMAZE) injection 25-50 mcg     naloxone (NARCAN) injection 0.1-0.4 mg     flumazenil (ROMAZICON) injection 0.2 mg     vasopressin (PITRESSIN) 40 Units in D5W 40 mL infusion     pantoprazole (PROTONIX) 80 mg in NaCl 0.9 % 100 mL infusion     octreotide (sandoSTATIN) 1,250 mcg in NaCl 0.9 % 250 mL     norepinephrine (LEVOPHED) 16 mg in D5W 250 mL infusion     cefTRIAXone (ROCEPHIN) 2 g vial to attach to  ml bag for ADULTS or NS 50 ml bag for PEDS     naloxone (NARCAN) injection 0.1-0.4 mg     HYDROmorphone (PF) (DILAUDID) injection 0.3-0.5 mg     0.9% sodium chloride BOLUS     ondansetron (ZOFRAN-ODT) ODT tab 4 mg    Or     ondansetron (ZOFRAN) injection 4 mg     NaCl 0.9 % 500 mL with INFUVITE 10 mL, folic acid 1 mg, thiamine  "100 mg infusion     propofol (DIPRIVAN) infusion     dextrose 5% and 0.45% NaCl infusion     glucose 40 % gel 15-30 g    Or     dextrose 50 % injection 25-50 mL    Or     glucagon injection 1 mg     insulin aspart (NovoLOG) inj (RAPID ACTING)     hypromellose-dextran (ARTIFICAL TEARS) ophthalmic solution 2-3 drop       Patient Active Problem List   Diagnosis     Hyperlipidemia     GI hemorrhage        Past Medical History:   Diagnosis Date     Other and unspecified hyperlipidemia      Substance abuse         Past Surgical History:   Procedure Laterality Date     C NONSPECIFIC PROCEDURE      Vasectomy       Social History   Substance Use Topics     Smoking status: Current Every Day Smoker     Packs/day: 1.00     Types: Cigarettes     Smokeless tobacco: Not on file      Comment: 1/2 pack daily     Alcohol use No      Comment: binge drinking for past 2 weeks       Family History   Problem Relation Age of Onset     HEART DISEASE Father      Neurologic Disorder Mother      dementia     Dementia Mother      CANCER Brother      throat       REVIEW OF SYSTEMS:     5 point ROS negative except as noted above in HPI, including Gen., Resp., CV, GI &  system review.    PHYSICAL EXAM:   /62  Pulse 118  Temp 100.6  F (38.1  C)  Resp 25  Wt 58 kg (127 lb 12.8 oz)  SpO2 99% Estimated body mass index is 20.65 kg/(m^2) as calculated from the following:    Height as of 9/18/06: 1.791 m (5' 10.5\").    Weight as of 10/3/06: 66.2 kg (146 lb).   GENERAL APPEARANCE: non-responsive  MENTAL STATUS: unresponsive  AIRWAY EXAM: Mallampatti Class II (visualization of the soft palate, fauces, and uvula)  RESP: lungs clear to auscultation - no rales, rhonchi or wheezes  CV: regular rates and rhythm    DIAGNOSTICS:      Not indicated    IMPRESSION     ASA Class 4 - Severe systemic disease that is a constant threat to life    PLAN:     EGD    The above has been forwarded to the consulting provider.    Signed Electronically by: Kota DUARTE" Bhavesh  September 25, 2017

## 2017-09-25 NOTE — PROGRESS NOTES
ICU Attending Note    I have seen and examined Jon Toribio, reviewed the patient's history, pertinent labs, vital signs, medications, physical exam, and radiographs.  The patient is critically ill by my examination and requires continued ICU monitoring and cares    Jon Toribio is admitted to ICU with hypotension, lactic acidosis, hypoglycemia, and GI bleed in the setting of alcohol abuse.  Has a history of substance abuse, depression, alcohol withdrawal..    Recent Events:  Resuscitated in ED, intubated for airway protection, thiamine and folate given, blood, FFP, plts, and vit k given. Started on protonix and octreotide qtts.      ROS:  10 point review of system is unobtainable due to mental status, sedation and intubation    Exam:  Temp:  [93  F (33.9  C)-100.6  F (38.1  C)] 100.6  F (38.1  C)  Pulse:  [118] 118  Heart Rate:  [] 104  Resp:  [14-30] 25  BP: ()/(49-80) 101/62  MAP:  [51 mmHg-99 mmHg] 63 mmHg  Arterial Line BP: ()/(37-85) 76/54  FiO2 (%):  [10 %-50 %] 40 %  SpO2:  [92 %-100 %] 99 %    Intake/Output Summary (Last 24 hours) at 09/25/17 1149  Last data filed at 09/25/17 1000   Gross per 24 hour   Intake          2966.79 ml   Output              450 ml   Net          2516.79 ml     Ventilation Mode: CMV/AC  FiO2 (%): 40 %  Rate Set (breaths/minute): 174 breaths/min  Tidal Volume Set (mL): 400 mL  PEEP (cm H2O): 5 cmH2O  Oxygen Concentration (%): 40 %  Resp: 25    Lungs clear  Heart rrr  abd taught, nontender, minimal tympany  Ext mild lower ext edema, palpable pedal pulses    CT fatty liver, possible fluid, possible thickened bowel wall    Results:  ABG   Recent Labs  Lab 09/24/17  2245   PH 7.41   PCO2 28*   PO2 182*   HCO3 18*     CBC  Recent Labs  Lab 09/25/17  0600 09/24/17  2330 09/24/17  2230 09/24/17  1941  09/24/17  1716   WBC 8.9  --  10.6  --   --  15.6*   HGB 7.5* 7.7* 8.0* 7.8*  < > 6.0*   HCT 22.4*  --  23.5*  --   --  18.4*   PLT 91*  --  85*  --   --  144*   < >  = values in this interval not displayed.  BMP  Recent Labs  Lab 09/25/17  0600 09/24/17 2230 09/24/17  1906 09/24/17  1759  09/24/17  1716   * 124* 123* 123*  < > 120*   POTASSIUM 4.3 4.9 4.9 5.0  < > 5.5*   CHLORIDE 95 89* 88* 86*  < > 81*   CO2 22 18* 17*  --   --  16*   BUN 42* 40* 42* 41*  < > 44*   CR 1.68* 2.15* 2.34*  --   --  2.63*   * 89 108* 110*  < > 122*   < > = values in this interval not displayed.  LFT  Recent Labs  Lab 09/25/17  0625 09/25/17 0600 09/24/17 2230 09/24/17 1716   AST  --  249*  --  316*   ALT  --  42  --  46   ALKPHOS  --  159*  --  195*   BILITOTAL  --  8.3*  --  8.7*   ALBUMIN  --  1.8*  --  1.8*   INR 2.00*  --  2.10* 2.64*     PancreasNo lab results found in last 7 days.  INR  Lab Results   Component Value Date    INR 2.00 09/25/2017    INR 2.10 09/24/2017    INR 2.64 09/24/2017     Lactate 8.7 -> 3.4    Current Issues:  1.  Hypotension: cause of this shock state suspected to be liver failure based on story.  No evidence of infection.  Fluids, vasopressors as needed.  2.  GI bleed: GI to see, possible endoscopy, coagulopathy corrected   3.  Alcohol abuse: watching for withdrawal, at this point not responsive  4.  Lactic acidosis: following, resuscitated with fluid,  Has perfusion by exam  5.  Hyponatremia: slowly resolving  6.  Liver failure: likely from alcohol, follow  7.  Protein calorie malnutrition: will need tube feeding    Evaluation and management time exclusive of procedures was 30 minutes critical care time including:  examination with the ICU team, discussion of the patient's condition with other physicians and members of the care team, reviewing all data related to the patient, and time utilizing the EMR for documentation of this patient's care.    Prosper Haro MD  Acute Care Surgery/Critical Care

## 2017-09-25 NOTE — PROGRESS NOTES
"SPIRITUAL HEALTH SERVICES  SPIRITUAL ASSESSMENT Progress Note  [facility] ICU 3rd floor    PRIMARY FOCUS:     Goals of care    Symptom/pain management    Emotional/spiritual/Jew distress    Support for coping    ILLNESS CIRCUMSTANCES:   Reviewed documentation. Reflective conversation shared with pt's son, Sherice, which integrated elements of illness and family narratives. Pt, Jon, was intubated and sedated during visit with his son.     Context of Serious Illness/Symptom(s) - Sherice (27yo) shares that his dad called him \"Kelsea\" (Robin's oldest sister) and he knew he was out of it and thought he should take him to the hospital. Jon then collapsed on the floor and \"he stopped breathing and all the color drained out of him.\" EMS was called and arrived. Jon was admitted with hypoglycemia and shock. Robin has a history of substance abuse, ETOH abuse, and depression.     Resources for Support - Sherice shares that Robin has a number of siblings, and is closest to his older sister Kelsea. Sherice names his mom, Edie, as his primary support along with his melinda.     DISTRESS:     Emotional/Existential/Relational Distress - Sherice notes \"I'm not sure if Pa will wake up, just waiting.\" Sherice also described the fear he experienced when trying to get Robin to the hospital and seeing him \"with all the life drained out of him.\" Sherice has a brother, Rubén, who lives in [place]     Spiritual/Caodaism Distress - None expressed.     Social/Cultural/Economic Distress - Sherice lives with his dad in their split level home. 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     SPIRITUAL/Hoahaoism COPING:     Zoroastrianism/Melinda - Sherice shares that he is of the Uatsdin melinda but not connected to a Tenriism.     Spiritual Practice(s) - \"I pray every day, and try to put other people first. I also try to stand up for others who are being bullied or teased.\"     Emotional/Existential/Relational " "Connections - Sherice is closest to his mom who lives in the area. He also has a brother who lives in [place]. Sherice is looking forward to his aunt Kelsea arriving within the hour.  GOALS OF CARE:    Goals of Care - Sherice isn't sure what is going to happen with his dad. \"I'm not sure if he will really wake up again or not.\" Sherice notes, \"We all know this would happen some time\" and shared Robin's family history of smoking, ETOH abuse, cardiac problems and notes that \"we all have bad hearts and lungs.'     Meaning/Sense-Making - Sherice engaged in episodic life review sharing stories of hunting/fishing with his dad [place]. Sherice also reflected on tiffany noting, \"You have to believe in something, otherwise when there is nowhere else to turn, what do you have?\" Sherice also shared stories around the tattoos that he wears that have a theme around tiffany and belief in God/Kulwant.     PLAN: Will continue to follow.  Oriented Sherice how to request chaplaincy support in addition to letting him know that I will continue to check in with him throughout the coming week.       Theo Gonzalez M.Div.  Staff   Pager [phone]  "

## 2017-09-25 NOTE — PROVIDER NOTIFICATION
Mag and phos added on, paged Dr. Hernandez with results. Replacement protocol requested.  Kely Montana

## 2017-09-26 NOTE — PROGRESS NOTES
RT note-  Received patient on full vent support  Patient remained on vent settings as followed during the shift:  Ventilation Mode: CMV/AC  FiO2 (%): 30 %  Rate Set (breaths/minute): 14 breaths/min  Tidal Volume Set (mL): 400 mL  PEEP (cm H2O): 5 cmH2O  Oxygen Concentration (%): 30 %  Resp: 13      Breath sounds are diminished and coarse ,  Suctioning out small cloudy secretions .  SPO2 98%  on  30% FIO2. Will continue to monitor patient.   No weaning today-fluid and vasopressors,  Lactic acidosis : resolving    Aparna Beatty , RT  September 26, 2017 5:35 PM

## 2017-09-26 NOTE — PROGRESS NOTES
Patient remains on full ventilator support    Ventilation Mode: CMV/AC  FiO2 (%): 30 %  Rate Set (breaths/minute): 14 breaths/min  Tidal Volume Set (mL): 400 mL  PEEP (cm H2O): 5 cmH2O  Oxygen Concentration (%): 30 %  Resp: 14    Suctioning moderate amount of thick creamy secretions, breath sounds coarse and diminished, No PS trial done patient on pressors.    Stephanie Archer  September 26, 2017.5:41 AM

## 2017-09-26 NOTE — PHARMACY-ADMISSION MEDICATION HISTORY
Admission medication history interview status for this patient is complete. See [facility] admission navigator for allergy information, prior to admission medications and immunization status.     Medication history interview source(s):Patient's family  Medication history resources (including written lists, pill bottles, clinic record):None  Primary pharmacy:     Changes made to PTA medication list:  Added: -  Deleted: -  Changed: -    Actions taken by pharmacist (provider contacted, etc):None     Additional medication history information:None    Medication reconciliation/reorder completed by provider prior to medication history? No    Do you take OTC medications (eg tylenol, ibuprofen, fish oil, eye/ear drops, etc)? n(Y/N)    For patients on insulin therapy: N (Y/N)  Lantus/levemir/NPH/Mix 70/30 dose:   (Y/N) (see Med list for doses)   Sliding scale Novolog Y/N  If Yes, do you have a baseline novolog pre-meal dose:  units with meals  Patients eat three meals a day:   Y/N    How many episodes of hypoglycemia do you have per week: _______  How many missed doses do you have per week: ______  How many times do you check your blood glucose per day: _______   Any Barriers to therapy - Be specific :  cost of medications, comfortable with giving injections (if applicable), comfortable and confident with current diabetes regimen: Y/N ______________      Prior to Admission medications    Not on File

## 2017-09-26 NOTE — PROGRESS NOTES
GASTROENTEROLOGY PROGRESS NOTE        SUBJECTIVE:  Intubated and sedated. Admitted to ICU with hypotension, lactic acidosis, hypoglycemia, and GI bleed in the setting of alcohol abuse.  Has a history of substance abuse, depression, alcohol withdrawal.        OBJECTIVE:    /68  Pulse 118  Temp 98.8  F (37.1  C)  Resp 17  Wt 60 kg (132 lb 4.4 oz)  SpO2 98%  Temp (24hrs), Av.9  F (37.7  C), Min:98.8  F (37.1  C), Max:100.8  F (38.2  C)    Patient Vitals for the past 72 hrs:   Weight   17 0600 60 kg (132 lb 4.4 oz)   17 0600 58 kg (127 lb 12.8 oz)   17 2146 54.9 kg (121 lb)   17 1745 63.6 kg (140 lb 3.4 oz)       Intake/Output Summary (Last 24 hours) at 17 0945  Last data filed at 17 0800   Gross per 24 hour   Intake          2350.19 ml   Output             2350 ml   Net             0.19 ml        PHYSICAL EXAM     Constitutional: Intubated and sedated  Cardiovascular: RRR, normal S1, S2, no murmur appreciated   Respiratory: good transmission, CTAB  Abdomen: soft, mild distention, non-tender  LE: 1 + LE edema      Additional Comments:  ROS, FH, SH: See initial GI consult for details.     I have reviewed the patient's new clinical lab results:     Recent Labs   Lab Test  17   0530  17   2140  17   1405  17   0625  17   0617   2230   WBC  PENDING   --    --    --   8.9   --   10.6   HGB  8.0*  7.9*  7.6*   --   7.5*   < >  8.0*   MCV  94   --    --    --   96   --   97   PLT  104*   --    --    --   91*   --   85*   INR  1.85*   --    --   2.00*   --    --   2.10*    < > = values in this interval not displayed.     Recent Labs   Lab Test  17   0530  17   0617   2230   POTASSIUM  3.7  4.3  4.9   CHLORIDE  95  95  89*   CO2  26  22  18*   BUN  37*  42*  40*   ANIONGAP  7  10  17*     Recent Labs   Lab Test  17   0530  17   0600  17   2141  17   1716   ALBUMIN  1.7*  1.8*   --   1.8*    BILITOTAL  11.6*  8.3*   --   8.7*   ALT  49  42   --   46   AST  208*  249*   --   316*   PROTEIN   --    --   30*   --      IMAGING    CT CHEST, ABDOMEN, PELVIS WITHOUT CONTRAST 9/24/2017 6:45 PM       IMPRESSION:  1. Nonspecific peritoneal fluid within the abdomen and pelvis.  2. Evaluation of the bowel is suboptimal due to lack of contrast  enhancement. Diffuse bowel wall thickening may be present. There is no  definitive evidence of bowel obstruction. No free peritoneal air.  3. Severe hepatic fatty infiltration.  4. Bilateral posterior dependent pulmonary mild atelectasis.     ASSESSMENT/ PLAN  Jon Toribio is a 63 y/o male with hx EtOH abuse, on-going found unresponsive at home.  He is currently intubated and sedated in the ICU.  GI was consulted for dark stool and possible hematemesis.     1.  Upper GI bleed: The setting of chronic alcohol use.  EGD revealing grade D reflux esophagitis with a superficial mucosal tear in the upper third of the esophagus.  He was initially started on octreotide but this stopped.  He is to continue on PPI therapy.  Avoid NG tube given tear in the proximal esophagus.  INR was two on admission.  Noted to have macrocytic anemia upon admission (hgb=6), elevated INR, and abnormal LFTs.     2. EtOH intoxication with EtOH hepatitis: AST greater than ALT.  Total bilirubin on admission was eight.  Now has climbed to 11.6.  INR improving at 1.85.  Platelets are 104,000  Patient was started on lactulose enemas as his ammonia was elevated.  CT imaging with fatty liver but no description of cirrhosis.  His discriminate function was elevated at 100 so she was started on prednisolone 40 mg daily per medicine team.     Dr. Ospina to see patient later this afternoon.    Naa Lai PA-C  [facility]

## 2017-09-26 NOTE — PROGRESS NOTES
ICU Attending Note    I have seen and examined Jon Toribio, reviewed the patient's history, pertinent labs, vital signs, medications, physical exam, and radiographs.  The patient is critically ill by my examination and requires continued ICU monitoring and cares    Jon Toribio is admitted to ICU with hypotension, lactic acidosis, hypoglycemia, and GI bleed in the setting of alcohol abuse.  Has a history of substance abuse, depression, alcohol withdrawal..    Recent Events:  Endoscopy yesterday demonstrated esophagitis.  Ammonia elevated, lactulose enema initiated.  Ammonia down this am.  Low dose propofol started for agitation overnight.  Low ionized calcium, replaced.  Still on vasopressors, including physiologic vasopressin.    ROS:  10 point review of system is unobtainable due to mental status, sedation and intubation    Exam:  Temp:  [98.4  F (36.9  C)-100.8  F (38.2  C)] 98.4  F (36.9  C)  Heart Rate:  [] 72  Resp:  [13-34] 14  BP: ()/(54-94) 127/84  MAP:  [62 mmHg-298 mmHg] 90 mmHg  Arterial Line BP: ()/() 136/64  FiO2 (%):  [30 %-40 %] 30 %  SpO2:  [92 %-100 %] 92 %    Intake/Output Summary (Last 24 hours) at 09/25/17 1149  Last data filed at 09/25/17 1000   Gross per 24 hour   Intake          2966.79 ml   Output              450 ml   Net          2516.79 ml     Ventilation Mode: CMV/AC  FiO2 (%): 30 %  Rate Set (breaths/minute): 14 breaths/min  Tidal Volume Set (mL): 400 mL  PEEP (cm H2O): 5 cmH2O  Oxygen Concentration (%): 30 %  Resp: 14    Lungs clear  Heart rrr  abd taught, nontender, minimal tympany  Ext mild lower ext edema, palpable pedal pulses    Results:  ABG     Recent Labs  Lab 09/24/17  2245   PH 7.41   PCO2 28*   PO2 182*   HCO3 18*     CBC  Recent Labs  Lab 09/26/17  0530 09/25/17  2140 09/25/17  1405 09/25/17  0600  09/24/17  2230  09/24/17  1716   WBC PENDING  --   --  8.9  --  10.6  --  15.6*   HGB 8.0* 7.9* 7.6* 7.5*  < > 8.0*  < > 6.0*   HCT 23.3*  --    --  22.4*  --  23.5*  --  18.4*   *  --   --  91*  --  85*  --  144*   < > = values in this interval not displayed.  BMP    Recent Labs  Lab 09/26/17  0530 09/25/17  0600 09/24/17 2230 09/24/17  1906   * 127* 124* 123*   POTASSIUM 3.7 4.3 4.9 4.9   CHLORIDE 95 95 89* 88*   CO2 26 22 18* 17*   BUN 37* 42* 40* 42*   CR 0.64* 1.68* 2.15* 2.34*   * 109* 89 108*     LFT    Recent Labs  Lab 09/26/17  0530 09/25/17  0625 09/25/17  0600 09/24/17 2230 09/24/17  1716   *  --  249*  --  316*   ALT 49  --  42  --  46   ALKPHOS 186*  --  159*  --  195*   BILITOTAL 11.6*  --  8.3*  --  8.7*   ALBUMIN 1.7*  --  1.8*  --  1.8*   INR 1.85* 2.00*  --  2.10* 2.64*     PancreasNo lab results found in last 7 days.  INR  Lab Results   Component Value Date    INR 2.00 09/25/2017    INR 2.10 09/24/2017    INR 2.64 09/24/2017     Lactate 8.7 -> 3.4 -> 2.2    Current Issues:  1.  Hypotension: cause of this shock state suspected to be liver failure based on story. Fluids and vasopressors as needed.  2.  GI bleed due to esophagitis: protonix, avoiding tubes (though needs a feeding tube)  3.  Alcohol abuse with probably withdrawal: based on events overnight, propofol   4.  Lactic acidosis: resolving  5.  Hyponatremia: slowly resolving, limit hypotonic fluid  6.  Liver failure: likely from alcoholic hepatitis.  Steroids started.  7.  Protein calorie malnutrition: will need tube feeding  8.  Anemia: follow for need to transfuse such as insufficient oxygen delivery.    Evaluation and management time exclusive of procedures was 30 minutes critical care time including:  examination with the ICU team, discussion of the patient's condition with other physicians and members of the care team, reviewing all data related to the patient, and time utilizing the EMR for documentation of this patient's care.    Prosper Haro MD  Acute Care Surgery/Critical Care

## 2017-09-26 NOTE — PROGRESS NOTES
[facility]  Hospitalist Progress Note  Nafisa Hernandez MD 09/26/2017    Reason for Stay (Diagnosis): Shock         Assessment and Plan:      Summary of Stay: Jon Toribio is a 62 year old male with a history of etoh use/dependence with hx of withdrawal, with increasing drinking-best guess is about 1 L per day who was found at home by his son coherent calling him by his aunt's name.   He then became unresponsive and so son started CPR and called EMS.  When EMS got there he was noted to be hypoglycemic at 39.  He was brought into the ER and found to be in shock with hypotension and lactic acidosis of 16.  He was intubated for airway protection in the setting of ongoing obtundation.   He had brinda melena in the ER and suspected to be having an UGIB.  He was empirically placed on octreotide and ceftriaxone due to concern for varices , PPI gtt for ulcer treatment, and underwent EGD 9/25 and found to have profound esophagitis.      Of grave concern is that he is intubated for airway protection and had not required any sedation.  During EGD though he was noted diaz the tube.  He is currently on propofol gtt primarily for suggestion of etoh w/d sx-although family noted that yest medardo he was more mobile    Per RT overnight suctioning thick secretions, high risk for aspiration in light of being obtunded on admission.       Problem List:   1. obtundation: Initially did not require sedation despite intubation/mechanical ventilation-NCHCT - no acute findings, I suspect a combination of things, in part Hepatic encephalopathy-seems to be moving around more, will need to lighten sedation daily to assess mental function   2. Shock 2/2 UGI Bleed/underlying icpwifbhx-[id number]-hxtb with norepi-vaso added 9/25, weaning down on norepi-vasopressin continues  3. ABL anemia:  Rec'd 2 U PRBCs thus far-cont to monitor-transfuse < 7.0  4. UGI Bleed:  Apparently 2/2 to esophagitis-discussed with KARMEN LOWE-looks due to reflux-no e/o  fungal infection.  Cont PPI-will switch from continuous infusion to bid  5. Acute on chronic hepatitis:  With significantly elevated bilirubin- upper 8 range now at 11, direct fraction is 9.6 so all liver related.  ALT wnl, and ->249->208.  Alk phos only minimally elevated. Liver shows fatty infiltration on CT but ascites is present.  I suspect some degree of cirrhosis.  INR elevated 2.0 range.  DF is > 100-so started methylprednisolone on 9/25/17(switch to prednisolone when able to take po) 40 mg per day x 28.  Ammonia on admit wnl at 12-> 90 (likely due UGIB) -> 28 after lactulose enemas.  Unable to give PO as cannot have NG in light of severe esophagitis  6. PENNY:  Looks all prerenal-cont with IVF-but will likely 3rd space in setting of hypoalbuminemia, cirrhosis-now renal function wnl  7. At risk for etoh withdrawal but propofol should mitigate against, at risk for withdrawal for the next 5-7 days  8. FEN:  Low phos/mag/ICa-replace as able  9. Pulmonary secretions-low grade fevers-check CXR  DVT Prophylaxis: Pneumatic Compression Devices  Code Status: Full Code  Discharge Dispo: unclear   Estimated Disch Date / # of Days until Disch: unclear    Discussed with son and sister at the bedside        Interval History (Subjective):      Chart thoroughly reviewed-discussed care with admitting MD/intesnivist/RN    Overnight no need for sedation despite intubation/mechanical ventilation.  Tolerated transfusions.                    Physical Exam:      Last Vital Signs:  /68  Pulse 118  Temp 98.8  F (37.1  C)  Resp 17  Wt 60 kg (132 lb 4.4 oz)  SpO2 98%    I/O:  Net up 2.7 L  UO: 1450 9/25      Intubated on propofol, no response to verbal or physical stimuli.  Lungs ctab nl effort RRR no m/r/g no le edema, abd distended no obvious tenderness. Minimal spontaneous movement, skin w/d, visibly jaundiced-urine dark in color           Medications:      All current medications were reviewed with changes reflected  in problem list.         Data:      All new lab and imaging data was reviewed.   Labs:    Recent Labs  Lab 09/26/17  0530   *   POTASSIUM 3.7   CHLORIDE 95   CO2 26   ANIONGAP 7   *   BUN 37*   CR 0.64*   GFRESTIMATED >90   GFRESTBLACK >90   HILTON 5.6*       Recent Labs  Lab 09/26/17  0530   WBC PENDING   HGB 8.0*   HCT 23.3*   MCV 94   *       Recent Labs  Lab 09/26/17  0530 09/25/17  1150 09/25/17  0600 09/24/17  1716   *  --  249* 316*   ALT 49  --  42 46   ALKPHOS 186*  --  159* 195*   BILITOTAL 11.6*  --  8.3* 8.7*   TEJA 28 90*  --  12       Recent Labs  Lab 09/26/17  0530 09/25/17  0625 09/24/17  2230   INR 1.85* 2.00* 2.10*      Imaging:

## 2017-09-26 NOTE — PLAN OF CARE
Problem: Restraint for Non-Violent/Non-Self-Destructive Behavior  Goal: Prevent/Manage Potential Problems  Maintain safety of patient and others during period of restraint.  Promote psychological and physical wellbeing.  Prevent injury to skin and involved body parts.  Promote nutrition, hydration, and elimination.   Right wrist and Left wrist restraints continued 9/26/2017     Clinical Justification: Pulling lines, pulling tubes, and pulling equipment  Less Restrictive Alternative: Repositioning, Re-evaluate equipment, Disguise equipment  Attending Physician Notified: Yes, Attending Physician's Name: Forenbacher   New orders placed Yes     Length of Order: 1 Day        Brenda Drummond

## 2017-09-26 NOTE — PLAN OF CARE
Problem: Restraint for Non-Violent/Non-Self-Destructive Behavior  Goal: Prevent/Manage Potential Problems  Maintain safety of patient and others during period of restraint.  Promote psychological and physical wellbeing.  Prevent injury to skin and involved body parts.  Promote nutrition, hydration, and elimination.   Outcome: No Change  ICU End of Shift Summary.  For vital signs and complete assessments, please see documentation flowsheets.      Pertinent assessments: Patient remains intubated. Able to wean down pressors. Good urine output. Patient responds to stimuli but doesn't follow verbal commands.  Major Shift Events:wean down pressors   Plan (Upcoming Events): continue to monitor  Discharge/Transfer Needs: to be determined     Bedside Shift Report Completed : yes  Bedside Safety Check Completed:yes

## 2017-09-26 NOTE — PLAN OF CARE
Problem: Goal Outcome Summary  Goal: Goal Outcome Summary  ICU End of Shift Summary.  For vital signs and complete assessments, please see documentation flowsheets.      Pertinent assessments: RASS -3. Elevated temp earlier in shift but resolved to normal this morning. Tolerating vent this morning now with sedation gtt. Unable to accurately assess CIWA score due to patient on ventilator. Lungs coarse. Tachycardic at times. BPs stable levo titrated downward now at 0 . 29. Good urine output, jamarcus in color. Enema administered with good results, ammonia level down this AM.   Major Shift Events: Patient respirations consistently 5-10/min over vent settings into the early morning hours. Patient moving arms and grinding teeth on bite block. Propofol gtt started. BPs remain stable. CA, Mag, Phos low this AM, replacement doses ordered.   Plan (Upcoming Events): Replace electrolytes. Monitor vitals, titrate drip as appropriate, assess for w/d symptoms.   Discharge/Transfer Needs: Pending.     Bedside Shift Report Completed :   Bedside Safety Check Completed:

## 2017-09-27 NOTE — PROGRESS NOTES
GASTROENTEROLOGY PROGRESS NOTE        SUBJECTIVE:  Intubated and sedated. Admitted to ICU with hypotension, lactic acidosis, hypoglycemia, and GI bleed in the setting of alcohol abuse.  Has a history of substance abuse, depression, alcohol withdrawal.        OBJECTIVE:    /78  Pulse 118  Temp 96.8  F (36  C)  Resp 14  Wt 62.2 kg (137 lb 2 oz)  SpO2 100%  Temp (24hrs), Av.9  F (37.7  C), Min:98.8  F (37.1  C), Max:100.8  F (38.2  C)    Patient Vitals for the past 72 hrs:   Weight   17 0000 62.2 kg (137 lb 2 oz)   17 0600 60 kg (132 lb 4.4 oz)   17 0600 58 kg (127 lb 12.8 oz)   17 2146 54.9 kg (121 lb)   17 1745 63.6 kg (140 lb 3.4 oz)       Intake/Output Summary (Last 24 hours) at 17 0945  Last data filed at 17 0800   Gross per 24 hour   Intake          2350.19 ml   Output             2350 ml   Net             0.19 ml        PHYSICAL EXAM     Constitutional: Intubated and sedated  Cardiovascular: RRR, normal S1, S2, no murmur appreciated   Respiratory: good transmission, CTAB  Abdomen: soft, mild distention, non-tender  LE: 1 + LE edema      Additional Comments:  ROS, FH, SH: See initial GI consult for details.     I have reviewed the patient's new clinical lab results:     Recent Labs   Lab Test  17   0500  17   0530  17   2140   17   0625  17   0600   17   2230   WBC  15.1*  10.1   --    --    --   8.9   --   10.6   HGB  8.3*  8.0*  7.9*   < >   --   7.5*   < >  8.0*   MCV  94  94   --    --    --   96   --   97   PLT  114*  104*   --    --    --   91*   --   85*   INR   --   1.85*   --    --   2.00*   --    --   2.10*    < > = values in this interval not displayed.     Recent Labs   Lab Test  17   0500  17   0530  17   0600   POTASSIUM  4.0  3.7  4.3   CHLORIDE  94  95  95   CO2  24  26  22   BUN  32*  37*  42*   ANIONGAP  9  7  10     Recent Labs   Lab Test  17   0530  17   0600  17    2141  09/24/17   1716   ALBUMIN  1.7*  1.8*   --   1.8*   BILITOTAL  11.6*  8.3*   --   8.7*   ALT  49  42   --   46   AST  208*  249*   --   316*   PROTEIN   --    --   30*   --      IMAGING    CT CHEST, ABDOMEN, PELVIS WITHOUT CONTRAST 9/24/2017 6:45 PM       IMPRESSION:  1. Nonspecific peritoneal fluid within the abdomen and pelvis.  2. Evaluation of the bowel is suboptimal due to lack of contrast  enhancement. Diffuse bowel wall thickening may be present. There is no  definitive evidence of bowel obstruction. No free peritoneal air.  3. Severe hepatic fatty infiltration.  4. Bilateral posterior dependent pulmonary mild atelectasis.     ASSESSMENT/ PLAN  Jon Toribio is a 63 y/o male with hx EtOH abuse, on-going found unresponsive at home.  He is currently intubated and sedated in the ICU.  GI was consulted for dark stool and possible hematemesis.     1.  Upper GI bleed: The setting of chronic alcohol use.  EGD revealing grade D reflux esophagitis with a superficial mucosal tear in the upper third of the esophagus.  He was initially started on octreotide but this stopped.  He is to continue on PPI therapy.  Avoid NG tube given tear in the proximal esophagus.  INR was two on admission. HgB 8.3.     2. EtOH intoxication with EtOH hepatitis: AST greater than ALT.  Total bilirubin on admission was eight.  Now has climbed to 11.6.  INR improving at 1.85.  Platelets are 104,000  Patient was started on lactulose enemas as his ammonia was elevated.  CT imaging with fatty liver but no description of cirrhosis.  His discriminate function was elevated at 100 so she was started on prednisolone 40 mg daily per medicine team. Would calculate Lille score at day 7 of steroids.  -- Follow LFTs daily. Awaiting labs to day.     Dr. Ospina to see patient later this afternoon.    Naa Lai PA-C  [facility]

## 2017-09-27 NOTE — PLAN OF CARE
Just now received AM lab results with low calcium. Will contact day physician for calcium replacement orders.

## 2017-09-27 NOTE — PLAN OF CARE
Problem: Restraint for Non-Violent/Non-Self-Destructive Behavior  Goal: Prevent/Manage Potential Problems  Maintain safety of patient and others during period of restraint.  Promote psychological and physical wellbeing.  Prevent injury to skin and involved body parts.  Promote nutrition, hydration, and elimination.   Right wrist and Left wrist restraints continued 9/27/2017     Clinical Justification: Pulling lines, pulling tubes, and pulling equipment  Less Restrictive Alternative: Repositioning, Re-evaluate equipment, Disguise equipment, Pain management, Reorientation  Attending Physician Notified: Yes, Attending Physician's Name: Forenbacher   New orders placed No  Length of Order: 1 Day        Brenda Drummond

## 2017-09-27 NOTE — PROGRESS NOTES
Patient remains on full ventilator support    Ventilation Mode: CMV/AC  FiO2 (%): 30 %  Rate Set (breaths/minute): 14 breaths/min  Tidal Volume Set (mL): 500 mL  PEEP (cm H2O): 5 cmH2O  Oxygen Concentration (%): 30 %  Resp: 12    Breath sounds coarse and diminished bilaterally, suctioning moderate amounts of cloudy thin secretions, No ventilator changes made this shift, RT will continue to follow.    Stephanie Archer  September 27, 2017.5:49 AM

## 2017-09-27 NOTE — PLAN OF CARE
Problem: Patient Care Overview  Goal: Plan of Care/Patient Progress Review  ICU End of Shift Summary.  For vital signs and complete assessments, please see documentation flowsheets.      Pertinent assessments: able to follow some commands and respond to questions at times while off sedation, LS coarse to dim, moderate secretions with good cough, afebrile, VSS, NSR, denies pain, restless while off sedation, vasopressin turned off after levo was titrated down to 0.9 and BPs maintained, arterial line is positional and leaking, central line also leaking and both dressings were changed, skin is fragile and two skin tears were sustained during turns - mepilexs in place  Major Shift Events: pressors weaned, tolerated brief wean trial and was off sedation most of the day ([id number])  Plan (Upcoming Events): sedate overnight then turn off to wean in the morning (~), wean pressors as tolerated  Discharge/Transfer Needs: may need TCU r/t deconditioning     Bedside Shift Report Completed : y  Bedside Safety Check Completed: marylin

## 2017-09-27 NOTE — PROGRESS NOTES
[facility]  Hospitalist Progress Note  Nafisa Hernandez MD 09/27/2017    Reason for Stay (Diagnosis): Shock         Assessment and Plan:      Summary of Stay: Jon Toribio is a 62 year old male with a history of etoh use/dependence with hx of withdrawal, with increasing drinking-best guess is about 1 L per day who was found at home by his son coherent calling him by his aunt's name.   He then became unresponsive and so son started CPR and called EMS.  When EMS got there he was noted to be hypoglycemic at 39.  He was brought into the ER and found to be in shock with hypotension and lactic acidosis of 16.  He was intubated for airway protection in the setting of ongoing obtundation.   He had brinda melena in the ER and suspected to be having an UGIB.  He was empirically placed on octreotide and ceftriaxone due to concern for varices , PPI gtt for ulcer treatment, and underwent EGD 9/25 and found to have profound esophagitis with superficial esophageal tear.  Due to this he does not have an NG/OG in per GI recs.    Initial labs notable for acute on chronic hepatitis with bili in 8 range but normal NH3, this promptly khari to 90 (in setting of UGIB) treated with lactulose enemas, and then normalized and remains so    Of grave concern is that he is intubated for airway protection and had not initially required any sedation.  During EGD though he was noted diaz the tube.  He is currently on propofol gtt primarily for suggestion of etoh w/d sx and over the course of today while off propofol has been much more interactive, engaging with sister, opening eyes to voice but only periodically following commands    Problem List:   1. obtundation: Initially did not require sedation despite intubation/mechanical ventilation-NCHCT - no acute findings, I suspect a combination of things, in part Hepatic encephalopathy-seems to be improving, exact level of improvement to be achieved remains unclear, but encouraging day.  He  has seemed a bit agitated and moving around a lot and I do suspect some discomfort in light of [facility] vent, have discussed with RN that seems reasonable to rest overnight on low dose propofol and d/c again in am around 5- 6 am for MS assessment  2. Shock 2/2 UGI Bleed/underlying [id number]-[id number]-[id number] with norepi-vaso added 9/25, plan wean norepi and if able to get to 0.1 mcg then stop vasopressin  3. ABL anemia:  Rec'd 2 U PRBCs thus far-cont to monitor-transfuse < 7.0  4. UGI Bleed:  Apparently 2/2 to esophagitis-discussed with KARMEN LOWE-looks due to reflux-no e/o fungal infection.  Cont PPI-switched from continuous infusion to bid  5. Acute on chronic hepatitis:  With significantly elevated bilirubin- upper 8 range now at 11, direct fraction is 9.6 so all liver related.  ALT wnl, and ->249->208.  Alk phos only minimally elevated. Liver shows fatty infiltration on CT but ascites is present.  I suspect some degree of cirrhosis.  INR elevated 2.0 range.  DF is > 100-so started methylprednisolone on 9/25/17(switch to prednisolone when able to take po) 40 mg per day x 28.  Ammonia on admit wnl at 12-> 90 (likely due UGIB) -> 28 after lactulose enemas and 36 today without further intervention Unable to give PO as cannot have NG in light of severe esophagitis  6. PENNY:  Looks all prerenal-cont with IVF-but will likely 3rd space in setting of hypoalbuminemia, cirrhosis-now renal function wnl  7. At risk for etoh withdrawal but propofol should mitigate against, at risk for withdrawal for the next 5-7 days  8. FEN:  Low phos/mag/ICa-replace as able  9. Pulmonary secretions-low grade fevers-CXR ? Right lower infiltrate-radiology does not agree although appears more prominent-and at risk for aspiration, still without fevers, WBC did jump to 15 today (which is probably steroid effect).  Not convinced of bacterial infection, recheck CXR in am, monitor closely - if starts to have fever would have low threshold for abx, aim  for aspiration coverage.   10. Low K/mag/PO4/Ca: replace and recheck.  He actually has had some response to IV calcium although typically that only stays around for a few hours.  I have not started a calcium gtt as he does not have e/o tetany.  ICa 3.5. Cont prn replacement, would rec cont infusion if becomes symptomatic.  Cont replace and recheck of others  DVT Prophylaxis: Pneumatic Compression Devices  Code Status: Full Code  Discharge Dispo: unclear   Estimated Disch Date / # of Days until Disch: unclear    Discussed with sister at the bedside        Interval History (Subjective):      Off sedation since this am-difficult to communicate with                   Physical Exam:      Last Vital Signs:  /79  Pulse 118  Temp 97.2  F (36.2  C)  Resp 15  Wt 62.2 kg (137 lb 2 oz)  SpO2 100%    I/O:  Net up 6.4 L  UO 1 L 9/26  Weight down 1.4 kg      Intubated propofol off for past several hours prior to my exam- opens eyes to voice intermittently following commands (squeeze hands/let go, move feet) but then at other times not-  Lungs ctab nl effort RRR no m/r/g no le edema, abd distended no obvious tenderness. Much more activity today and houser, skin w/d, visibly jaundiced-urine dark in color           Medications:      All current medications were reviewed with changes reflected in problem list.         Data:      All new lab and imaging data was reviewed.   Labs:    Recent Labs  Lab 09/27/17  0500   *   POTASSIUM 4.0   CHLORIDE 94   CO2 24   ANIONGAP 9   *   BUN 32*   CR 0.52*   GFRESTIMATED >90   GFRESTBLACK >90   HILTON 6.1*       Recent Labs  Lab 09/27/17  0500   WBC 15.1*   HGB 8.3*   HCT 23.7*   MCV 94   *       Recent Labs  Lab 09/26/17  0530 09/25/17  1150 09/25/17  0600 09/24/17  1716   *  --  249* 316*   ALT 49  --  42 46   ALKPHOS 186*  --  159* 195*   BILITOTAL 11.6*  --  8.3* 8.7*   TEJA 28 90*  --  12       Recent Labs  Lab 09/26/17  0530 09/25/17  0625 09/24/17  2230   INR  1.85* 2.00* 2.10*      Imaging:

## 2017-09-27 NOTE — PROGRESS NOTES
Respiratory Therapy    Patient remains intubated and on mechanical ventilator settings as follows:  Ventilation Mode: CMV/AC  FiO2 (%): 30 %  Rate Set (breaths/minute): 14 breaths/min  Tidal Volume Set (mL): 500 mL  PEEP (cm H2O): 5 cmH2O  Oxygen Concentration (%): 30 %  Resp: 15    Breath sounds are diminished, suctioning out small cloudy secretions via ETT. Will continue to assess and monitor.    Maddie Smart RRT  9/27/2017

## 2017-09-27 NOTE — PLAN OF CARE
Problem: Goal Outcome Summary  Goal: Goal Outcome Summary  ICU End of Shift Summary.  For vital signs and complete assessments, please see documentation flowsheets.      Pertinent assessments: Patient remains on ventilator. Responds to stimuli but does not follow commands. Low urine output.   Major Shift Events: Weaning pressors.   Plan (Upcoming Events): Continue to wean medications, monitor. Address nutritional status today.   Discharge/Transfer Needs: Pending.     Bedside Shift Report Completed :   Bedside Safety Check Completed:

## 2017-09-27 NOTE — PROGRESS NOTES
ICU Attending Note    I have seen and examined Jon Toribio, reviewed the patient's history, pertinent labs, vital signs, medications, physical exam, and radiographs.  The patient is critically ill by my examination and requires continued ICU monitoring and cares    Jon Toribio is admitted to ICU with hypotension, lactic acidosis, hypoglycemia, and GI bleed in the setting of alcohol abuse.  Has a history of substance abuse, depression, alcohol withdrawal..    Recent Events:  No significant changes.  Does not wake but moves head.  Norepi needs decreasing.      ROS:  10 point review of system is unobtainable due to mental status, sedation and intubation    Exam:  Temp:  [96.8  F (36  C)-98.4  F (36.9  C)] 97.2  F (36.2  C)  Heart Rate:  [62-85] 80  Resp:  [8-26] 15  BP: ()/(56-89) 122/79  MAP:  [63 mmHg-100 mmHg] 94 mmHg  Arterial Line BP: ()/(47-70) 133/67  FiO2 (%):  [30 %] 30 %  SpO2:  [92 %-100 %] 100 %    Intake/Output Summary (Last 24 hours) at 09/25/17 1149  Last data filed at 09/25/17 1000   Gross per 24 hour   Intake          2966.79 ml   Output              450 ml   Net          2516.79 ml     Ventilation Mode: CMV/AC  FiO2 (%): 30 %  Rate Set (breaths/minute): 14 breaths/min  Tidal Volume Set (mL): 500 mL  PEEP (cm H2O): 5 cmH2O  Oxygen Concentration (%): 30 %  Resp: 15    Lungs clear  Heart rrr  abd taught, nontender, minimal tympany  Ext mild lower ext edema, palpable pedal pulses    Results:  ABG     Recent Labs  Lab 09/24/17  2245   PH 7.41   PCO2 28*   PO2 182*   HCO3 18*     CBC  Recent Labs  Lab 09/27/17  0500 09/26/17  0530 09/25/17  2140 09/25/17  1405 09/25/17  0600  09/24/17  2230   WBC 15.1* 10.1  --   --  8.9  --  10.6   HGB 8.3* 8.0* 7.9* 7.6* 7.5*  < > 8.0*   HCT 23.7* 23.3*  --   --  22.4*  --  23.5*   * 104*  --   --  91*  --  85*   < > = values in this interval not displayed.  BMP    Recent Labs  Lab 09/27/17  0500 09/26/17  0530 09/25/17  0600 09/24/17  5272    * 128* 127* 124*   POTASSIUM 4.0 3.7 4.3 4.9   CHLORIDE 94 95 95 89*   CO2 24 26 22 18*   BUN 32* 37* 42* 40*   CR 0.52* 0.64* 1.68* 2.15*   * 147* 109* 89     LFT    Recent Labs  Lab 09/26/17  0530 09/25/17  0625 09/25/17  0600 09/24/17  2230 09/24/17  1716   *  --  249*  --  316*   ALT 49  --  42  --  46   ALKPHOS 186*  --  159*  --  195*   BILITOTAL 11.6*  --  8.3*  --  8.7*   ALBUMIN 1.7*  --  1.8*  --  1.8*   INR 1.85* 2.00*  --  2.10* 2.64*     PancreasNo lab results found in last 7 days.  INR  Lab Results   Component Value Date    INR 2.00 09/25/2017    INR 2.10 09/24/2017    INR 2.64 09/24/2017     Lactate 8.7 -> 3.4 -> 2.2    Current Issues:  1.  Hypotension: cause of this shock state suspected to be liver failure based on story. Decrease fluids.  Wean vasopressors.   2.  GI bleed due to esophagitis: protonix, avoiding tubes (though needs a feeding tube)  3.  Alcohol abuse with probably withdrawal: based on events overnight, propofol as needed.   Keep off if possible to be able to evaluate evolving mental status.  4.  Lactic acidosis: resolved  5.  Hyponatremia: slowly resolving, limit hypotonic fluid  6.  Liver failure: likely from alcoholic hepatitis.  Steroids started.  7.  Protein calorie malnutrition: will need tube feeding  8.  Anemia: follow for need to transfuse such as insufficient oxygen delivery.    Evaluation and management time exclusive of procedures was 30 minutes critical care time including:  examination with the ICU team, discussion of the patient's condition with other physicians and members of the care team, reviewing all data related to the patient, and time utilizing the EMR for documentation of this patient's care.    Prosper Haro MD  Acute Care Surgery/Critical Care

## 2017-09-27 NOTE — PLAN OF CARE
Problem: Restraint for Non-Violent/Non-Self-Destructive Behavior  Goal: Prevent/Manage Potential Problems  Maintain safety of patient and others during period of restraint.  Promote psychological and physical wellbeing.  Prevent injury to skin and involved body parts.  Promote nutrition, hydration, and elimination.   Continue bilateral wrist restraints for sedated/intubated patient at risk of pulling lines and tubes.

## 2017-09-28 NOTE — PROGRESS NOTES
[facility]  Hospitalist Progress Note  Keith Jefferson MD 09/28/17    Reason for Stay (Diagnosis): respiratory failure, etoh withdrawal         Assessment and Plan:      Summary of Stay: Jon Toribio is a 62 year old male with hx of etoh abuse with dependence and hx of withdrawal who has been drinking more lately and was found confused by a family member.  He then went unresponsive so the son started CPR and called EMS.  BG 39 for EMS.  He was in shock with lactic acidosis of 16 in the ED.  He was intubated for airway protection as he was obtunded and admitted to the ICU on 9/24/2017.  He had brinda melena and was put on octreotide gtt, protonix gtt, and ceftriaxone for presumed UGIB an possibility of varices.  GI consulted and he had EGD 9/5 showing severe esophagitis with a superficial esophageal tear.  No NG/OG per GI.  Initial labs show acute hepatitis presumably from etoh so he was started on methylprednisolone for a high DFscore.  Ammonia level was up that resolved with lactulose enemas.  His sedation was weaned and passed PST so extubated morning of 9/28.  Trial of clears and monitor for bleeding.  Has evidence for malnutrition and multiple electrolyte deficiencies that are being replaced.    Problem List/Assessment and Plan:   Acute encephalopathy: obtunded on arrival, possibly from alcohol withdrawal and hepatic encephalopathy, but cannot say for sure as he just received CPR.  Etoh level negative.  Suppose seizure also in differential given heavy etoh use.  Initially did not require sedation despite intubation/mechanical ventilation.  Head CT without acute findings.  Following commands now and extubated, so will follow mental status here.    Shock due to UGI Bleed and underlying cirrhosis:  Weaning off norepinephrine.  Vasopressin off.  suspect a low BP at baseline from cirrhosis.    Acute blood loss anemia:  Rec'd 2 U PRBCs here.  Hg now stable without evidence for ongoing  bleeding.    UGI Bleed:  Melena on admit.  Secondary to severe esophagitis and superficial esophageal tear.  -bid ppi    Acute on chronic alcoholic hepatitis:  Bilirubin 11-12, direct fraction is 9.6 so all likely liver related.  ALT wnl, and AST rending down from 315.  Alk phos only minimally elevated. Liver shows fatty infiltration on CT but ascites is present.  I suspect some degree of cirrhosis.  INR elevated 2.0 range initially, some of which may be nutritional.  DF is > 100 so started methylprednisolone on 9/25/17.    PENNY:  Looks all prerenal.  Continue low rate MIVF-but will likely 3rd space in setting of hypoalbuminemia, cirrhosis.  Creatinine normalized.    Alcohol abuse and dependence:  It is suspected he was drinking 1L alcohol per day.  Possible some withdrawal earlier on and will need to monitor for this now off sedation.  -CIWA with prn ativan    Pulmonary secretions:  Small opacity LLL on chest xray 9/27.  At risk for aspiration, still without fevers, WBC rising which is probably steroid effect.  Not convinced of bacterial infection, recheck CXR in am, monitor closely - if starts to have fever would have low threshold for abx, aim for aspiration coverage.     Low K, mag, PO4, Ca: replace and recheck.   -2g ca gluconate IV today  -replace phos per protocol    Hyponatremia: 120 on admit now Na 129 and stable.  Will monitor as his diet is advanced.  Suspect some component of his liver disease.  Has 1-2+ LE edema and some ascites.  Currently on D51/2NS at 50ml/hr.  -daily bmp    Coagulopathy:  INR 2.64 on admit.  Likely combination of liver disease and nutritional deficiency.  Down to 1.85 most recently.    Severe malnutrition: suspect protein calore malnutrition.  -dietician consulte    DVT Prophylaxis: Pneumatic Compression Devices, no heparin for GI bleed  Code Status: Full Code  FEN: will try clears today if passes SLP, but not advance further today due to his severe esophagitis  Discharge Dispo:  tbd  Estimated Disch Date / # of Days until Disch: tbd.  Can transfer to medical floor if pressors weaned off either tonight or tomorrow        Interval History (Subjective):      Assumed care today.  Awake this morning and calm.  Passed PST so extubated this morning and doing ok from this.  BP still soft on small dose levophed.  Trial of clears after SLP today.                  Physical Exam:      Last Vital Signs:  /81  Pulse 118  Temp 97.7  F (36.5  C)  Resp 13  Wt 65.6 kg (144 lb 10 oz)  SpO2 97%      Intake/Output Summary (Last 24 hours) at 09/28/17 1057  Last data filed at 09/28/17 1000   Gross per 24 hour   Intake          1943.56 ml   Output             3005 ml   Net         -1061.44 ml       Constitutional: Awake, NAD  Eyes: sclera yellow  HEENT:  MMM  Respiratory: lungs clear without crackles or wheeze  Cardiovascular: RRR.  No murmur   GI: thin, non-tender, not distended, bowel sounds present  Genitourinary: johnston draining yellow urine  Skin: bruising to R forearm  Musculoskeletal/extremities: 1-2+ bilateral LE pitting edema  Neurologic: Alert and following commands prior to extubation, strength equal bilaterally  Psychiatric: calm          Medications:      All current medications were reviewed with changes reflected in problem list.         Data:      All new lab and imaging data was reviewed.   Labs:    Recent Labs  Lab 09/28/17 0445 09/27/17  0500 09/26/17  0530   WBC 17.8* 15.1* 10.1   HGB 8.0* 8.3* 8.0*   HCT 23.3* 23.7* 23.3*   MCV 96 94 94   * 114* 104*       Recent Labs  Lab 09/28/17  0445 09/27/17  0500 09/26/17  1334 09/26/17  0530   * 127*  --  128*   POTASSIUM 4.2 4.0  --  3.7   CHLORIDE 99 94  --  95   CO2 25 24  --  26   ANIONGAP 5 9  --  7   GLC 92 151*  --  147*   BUN 22 32*  --  37*   CR 0.42* 0.52*  --  0.64*   GFRESTIMATED >90 >90  --  >90   GFRESTBLACK >90 >90  --  >90   HILTON 6.6* 6.1*  --  5.6*   MAG  --  2.4* 2.8* 1.5*   PHOS 1.9* 1.9* 2.2* 1.4*   PROTTOTAL  4.5* 4.7*  --  4.5*   ALBUMIN 1.6* 1.7*  --  1.7*   BILITOTAL 12.6* 11.3*  --  11.6*   ALKPHOS 212* 215*  --  186*   * 154*  --  208*   ALT 65 58  --  49      Imaging:   None today      Keith Jefferson MD

## 2017-09-28 NOTE — PLAN OF CARE
Problem: Goal Outcome Summary  Goal: Goal Outcome Summary  Discharge Planner SLP   Patient plan for discharge: none stated  Current status: Bedside swallow eval completed today. Pt extubated today at 9AM, however medical team requesting swallow eval today d/t pts inability to tolerate TF during intubation d/t esophageal tear. Pt presents with mild oropharyngeal s/p extubation in the setting of generalized weakness. From an oropharyngeal standpoint, pt OK for clear liquids with 1:1 supervision. Pt should be fully upright and alert for all PO, take small single sips/bites, pace self, alternate consistencies, and remain upright for 30-60 minutes following PO. Hold PO should pt demonstrate overt s/sx of aspiration or if pt is not alert enough to safely take PO.   Barriers to return to prior living situation: dysphagia; poor PO intake  Recommendations for discharge: ongoing ST for dysphagia   Rationale for recommendations: pt is not yet at baseline diet level; dysphagia        Entered by: Yari Fagan 09/28/2017 2:52 PM

## 2017-09-28 NOTE — PLAN OF CARE
Problem: Patient Care Overview  Goal: Plan of Care/Patient Progress Review  ICU End of Shift Summary.  For vital signs and complete assessments, please see documentation flowsheets.      Pertinent assessments: oriented to self and place, slow slurred speech, lethargic, follows commands, purposeful movement, LS diminished, sats upper 90s on 3L NC, BP maintained without levo by this afternoon, afebrile, denies pain, very weak, incont of urine with brief in place, able to swallow clear liquids but no appetite  Major Shift Events: extubated, pressors off, johnston removed, diet started  Plan (Upcoming Events): encourage diet and activity and use of IS  Discharge/Transfer Needs: may soon be ready for transfer, may need TCU at d/c     Bedside Shift Report Completed : y  Bedside Safety Check Completed: y

## 2017-09-28 NOTE — PROGRESS NOTES
GASTROENTEROLOGY PROGRESS NOTE        SUBJECTIVE: Extubated 9 am . Alert, orientated only to person. Denies abdominal pain.         OBJECTIVE:    /79  Pulse 118  Temp 97.7  F (36.5  C)  Resp 14  Wt 65.6 kg (144 lb 10 oz)  SpO2 99%  Temp (24hrs), Av.9  F (37.7  C), Min:98.8  F (37.1  C), Max:100.8  F (38.2  C)    Patient Vitals for the past 72 hrs:   Weight   17 0000 65.6 kg (144 lb 10 oz)   17 0000 62.2 kg (137 lb 2 oz)   17 0600 60 kg (132 lb 4.4 oz)       Intake/Output Summary (Last 24 hours) at 17 0945  Last data filed at 17 0800   Gross per 24 hour   Intake          2350.19 ml   Output             2350 ml   Net             0.19 ml        PHYSICAL EXAM     Constitutional: NAD  Cardiovascular: Tachy, normal S1, S2, no murmur appreciated   Respiratory: poor transmission, decreased bilaterally- ant chest.  Abdomen: soft, mild distention, non-tender  LE: 1 + LE edema      Additional Comments:  ROS, FH, SH: See initial GI consult for details.     I have reviewed the patient's new clinical lab results:     Recent Labs   Lab Test  17   0530   17   0625   17   2230   WBC  17.8*  15.1*  10.1   --    --    < >  10.6   HGB  8.0*  8.3*  8.0*   < >   --    < >  8.0*   MCV  96  94  94   --    --    < >  97   PLT  116*  114*  104*   --    --    < >  85*   INR   --    --   1.85*   --   2.00*   --   2.10*    < > = values in this interval not displayed.     Recent Labs   Lab Test  17   0500  17   0530   POTASSIUM  4.2  4.0  3.7   CHLORIDE  99  94  95   CO2  25  24  26   BUN  22  32*  37*   ANIONGAP  5  9  7     Recent Labs   Lab Test  17   0500  17   0530   17   2141   ALBUMIN  1.6*  1.7*  1.7*   < >   --    BILITOTAL  12.6*  11.3*  11.6*   < >   --    ALT  65  58  49   < >   --    AST  140*  154*  208*   < >   --    PROTEIN   --    --    --    --   30*    < > = values  in this interval not displayed.     IMAGING    CT CHEST, ABDOMEN, PELVIS WITHOUT CONTRAST 9/24/2017 6:45 PM       IMPRESSION:  1. Nonspecific peritoneal fluid within the abdomen and pelvis.  2. Evaluation of the bowel is suboptimal due to lack of contrast  enhancement. Diffuse bowel wall thickening may be present. There is no  definitive evidence of bowel obstruction. No free peritoneal air.  3. Severe hepatic fatty infiltration.  4. Bilateral posterior dependent pulmonary mild atelectasis.    9/28 Liver Scores   [facility] Discriminant Function:57 Severe disease ?32    MELD: 22.9 Severe disease ?21    90 day survival by MELD  65%       ASSESSMENT/ PLAN  Jon Toribio is a 61 y/o male with hx EtOH abuse, on-going found unresponsive at home.  He was extubated 9/28 and being managed in the ICU.  GI was consulted for dark stool and possible hematemesis.     1.  Upper GI bleed: The setting of chronic alcohol use.  EGD revealing grade D reflux esophagitis with a superficial mucosal tear in the upper third of the esophagus.  He was initially started on octreotide but this stopped.  He is to continue on PPI therapy.  Avoid NG tube given tear in the proximal esophagus--will address FT with Dr. Ospina .  INR 1.85 (9/26)on admission. HgB 8. No further evidence of overt GI bleeding.     2. EtOH intoxication with EtOH hepatitis: AST greater than ALT.  Total bilirubin on admission was eight.  Now has climbed to 11.6.  INR improving at 1.85.  Platelets are 104,000  Patient was started on lactulose enemas as his ammonia was elevated.  CT imaging with fatty liver but no description of cirrhosis.  His discriminate function was elevated so he was started on prednisolone 40 mg daily per medicine team.   -- Discriminate function improving. Steroids started 9/25 Day 7 steroids Oct 1st, calculate Lille score.  -- MELD 22.9      ADDENDUM   Okay for feeding tube after discussion with Dr. Ospina.     BO Thurston  Gastroenterology

## 2017-09-28 NOTE — PLAN OF CARE
Problem: Goal Outcome Summary  Goal: Goal Outcome Summary  Outcome: Improving  ICU End of Shift Summary.  For vital signs and complete assessments, please see documentation flowsheets.      Pertinent assessments: Sedation decreased for trial wean this AM; patient nodding head yes/no to questions appropriately; restless when sedation is decreased. VSS. Afebrile. Tele SR.   Major Shift Events: Levophed titrated to keep MAP > 65. Small amount thin creamy secretions from ETT. Arterial and central lines continue to leak; arterial line dressing changed.   Plan (Upcoming Events): Possible extubation today.   Discharge/Transfer Needs: ? TCU placement at discharge d/t deconditioning     Bedside Shift Report Completed :   Bedside Safety Check Completed:

## 2017-09-28 NOTE — PROGRESS NOTES
Patient currently on PS trial doing well    Breath sounds diminished, suctioning moderate amount of cloudy secretions, RT will continue to follow.    Stephanie Archer  September 28, 2017.6:14 AM

## 2017-09-28 NOTE — PROGRESS NOTES
09/28/17 1440   General Information   Onset Date 09/24/17   Start of Care Date 09/28/17   Referring Physician Prosper Haro MD   Patient Profile Review/OT: Additional Occupational Profile Info See Profile for full history and prior level of function   Patient/Family Goals Statement Pt did not state   Swallowing Evaluation Bedside swallow evaluation   Behaviorial Observations Distractible   Mode of current nutrition NPO   Respiratory Status O2 Supply   Type of O2 supply Nasal cannula   Comments Jon Toribio is a 62 year old male with hx of etoh abuse with dependence and hx of withdrawal who has been drinking more lately and was found confused by a family member.  He then went unresponsive so the son started CPR and called EMS.  BG 39 for EMS.  He was in shock with lactic acidosis of 16 in the ED.  He was intubated for airway protection as he was obtunded and admitted to the ICU on 9/24/2017.  He had brinda melena and was put on octreotide gtt, protonix gtt, and ceftriaxone for presumed UGIB an possibility of varices.  GI consulted and he had EGD 9/5 showing severe esophagitis with a superficial esophageal tear.  No NG/OG per GI. Pt extubated today at 9AM, however MD and RN requesting swallow eval today as pt has been unable to have TF since intubation d/t esophageal tear. Per discussion with MD, ok to trial clear liquids only this date. Bedside swallow eval completed per MD orders to further assess oropharyngeal swallow function.    Clinical Swallow Evaluation   Oral Musculature generally intact   Structural Abnormalities none present   Dentition other (see comments)  (missing upper dentition; missing many lower dentition)   Mucosal Quality dry   Oral Labial Strength and Mobility WFL   Lingual Strength and Mobility WFL   Buccal Strength and Mobility impaired   Laryngeal Function Cough;Throat clear;Swallow;Voicing initiated   Oral Musculature Comments Based wet vocal quality; generalized weakness    Additional Documentation Yes   Clinical Swallow Eval: Thin Liquid Texture Trial   Mode of Presentation, Thin Liquids cup;spoon;fed by clinician   Volume of Liquid or Food Presented 3 oz; x2 tbsp jello   Oral Phase of Swallow Premature pharyngeal entry;Poor AP movement   Pharyngeal Phase of Swallow impaired;wet vocal quality after swallow   Diagnostic Statement Initial trial of thin liquids via spoon resulted in wet vocal quality which was unchanged from baseline wet vocal quality; howeve with progressive thin liquid trials pt with no overt s/sx of aspiration and clear vocal quality   VFSS Evaluation   VFSS Additional Documentation No   FEES Evaluation   Additional Documentation No   Swallow Compensations   Swallow Compensations Alternate viscosity of consistencies;Pacing;Reduce amounts;Multiple swallow   Results Oral difficulties only   Esophageal Phase of Swallow   Patient reports or presents with symptoms of esophageal dysphagia Yes   Esophageal comments Pt with severe esophagitis and esophageal tear per GI   General Therapy Interventions   Planned Therapy Interventions Dysphagia Treatment   Dysphagia treatment Modified diet education;Instruction of safe swallow strategies;Compensatory strategies for swallowing   Swallow Eval: Clinical Impressions   Skilled Criteria for Therapy Intervention Skilled criteria met.  Treatment indicated.   Functional Assessment Scale (FAS) 5   Treatment Diagnosis Mild oropharyngeal dysphagia   Diet texture recommendations Clear liquid;Thin liquids   Recommended Feeding/Eating Techniques alternate between small bites and sips of food/liquid;check mouth frequently for oral residue/pocketing;hard swallow w/ each bite or sip;maintain upright posture during/after eating for 30 mins;small sips/bites   Demonstrates Need for Referral to Another Service dietitian;occupational therapy;physical therapy   Therapy Frequency 5 times/wk   Predicted Duration of Therapy Intervention (days/wks) 1  week   Anticipated Discharge Disposition extended care facility   Risks and Benefits of Treatment have been explained. Yes   Patient, family and/or staff in agreement with Plan of Care Yes   Clinical Impression Comments SLP: Bedside swallow eval completed per MD orders. Pt extubated today at 9AM, however medical team requesting swallow eval today d/t pts inability to tolerate TF during intubation d/t esophageal tear. Pt presents with mild oropharyngeal s/p extubation in the setting of generalized weakness. Pt noted to have baseline wet vocal quality which remained unchanged during initial trials of thin liquids. However, with progressive trials of thin liquids via spoon and cup pt with no overt s/sx of aspiration and pts vocal quality remained clear. Pt tolerated x2 tbsp of jello with no overt s/sx of aspiration. Pt noted to intermittently orally hold bolus which required cues to swallow. From an oropharyngeal standpoint, pt OK for clear liquids with 1:1 supervision. Pt should be fully upright and alert for all PO, take small single sips/bites, pace self, alternate consistencies, and remain upright for 30-60 minutes following PO. Hold PO should pt demonstrate overt s/sx of aspiration or if pt is not alert enough to safely take PO. ST to continue to follow for diet tolerance and advanced trials as medically appropriate. Anticipate pt will require ongoing ST for dysphagia upon d/c to next level of care.    Total Evaluation Time   Total Evaluation Time (Minutes) 8

## 2017-09-28 NOTE — PLAN OF CARE
Problem: Restraint for Non-Violent/Non-Self-Destructive Behavior  Goal: Prevent/Manage Potential Problems  Maintain safety of patient and others during period of restraint.  Promote psychological and physical wellbeing.  Prevent injury to skin and involved body parts.  Promote nutrition, hydration, and elimination.   Outcome: Completed Date Met:  09/28/17  Restraints removed post extubation and patient demonstrated and verbalized understanding of the risk of pulling lines. Patient is calm and cooperative, does not require restraints at this point.

## 2017-09-28 NOTE — PROGRESS NOTES
ICU Attending Note    I have seen and examined Jon Toribio, reviewed the patient's history, pertinent labs, vital signs, medications, physical exam, and radiographs.  The patient is critically ill by my examination and requires continued ICU monitoring and cares    Jon Toribio is admitted to ICU with hypotension, lactic acidosis, hypoglycemia, and GI bleed in the setting of alcohol abuse.  Has a history of substance abuse, depression, alcohol withdrawal..    Recent Events:    Awake today.  Follows commands.  Passed weaning trial. Still low dose vasopressors.    ROS:  10 point review of system is unobtainable due to mental status, sedation and intubation    Exam:  Temp:  [96.1  F (35.6  C)-97.7  F (36.5  C)] 97.7  F (36.5  C)  Heart Rate:  [64-97] 97  Resp:  [9-25] 13  BP: ()/(50-87) 107/81  MAP:  [55 mmHg-86 mmHg] 81 mmHg  Arterial Line BP: ()/(40-60) 121/59  FiO2 (%):  [30 %] 30 %  SpO2:  [91 %-99 %] 97 %    Intake/Output Summary (Last 24 hours) at 09/25/17 1149  Last data filed at 09/25/17 1000   Gross per 24 hour   Intake          2966.79 ml   Output              450 ml   Net          2516.79 ml     Ventilation Mode: CPAP/PS  FiO2 (%): 30 %  Rate Set (breaths/minute): 14 breaths/min  Tidal Volume Set (mL): 500 mL  PEEP (cm H2O): 5 cmH2O  Pressure Support (cm H2O): 5 cmH2O  Oxygen Concentration (%): 30 %  Resp: 13    Lungs clear  Heart rrr  abd taught, nontender, minimal tympany  Ext mild lower ext edema, palpable pedal pulses    Results:  ABG     Recent Labs  Lab 09/24/17  2245   PH 7.41   PCO2 28*   PO2 182*   HCO3 18*     CBC  Recent Labs  Lab 09/28/17  0445 09/27/17  0500 09/26/17  0530 09/25/17  2140  09/25/17  0600   WBC 17.8* 15.1* 10.1  --   --  8.9   HGB 8.0* 8.3* 8.0* 7.9*  < > 7.5*   HCT 23.3* 23.7* 23.3*  --   --  22.4*   * 114* 104*  --   --  91*   < > = values in this interval not displayed.  BMP    Recent Labs  Lab 09/28/17  0445 09/27/17  0500 09/26/17  0582  09/25/17  0600   * 127* 128* 127*   POTASSIUM 4.2 4.0 3.7 4.3   CHLORIDE 99 94 95 95   CO2 25 24 26 22   BUN 22 32* 37* 42*   CR 0.42* 0.52* 0.64* 1.68*   GLC 92 151* 147* 109*     LFT    Recent Labs  Lab 09/28/17  0445 09/27/17  0500 09/26/17  0530 09/25/17  0625 09/25/17  0600 09/24/17  2230 09/24/17  1716   * 154* 208*  --  249*  --  316*   ALT 65 58 49  --  42  --  46   ALKPHOS 212* 215* 186*  --  159*  --  195*   BILITOTAL 12.6* 11.3* 11.6*  --  8.3*  --  8.7*   ALBUMIN 1.6* 1.7* 1.7*  --  1.8*  --  1.8*   INR  --   --  1.85* 2.00*  --  2.10* 2.64*     PancreasNo lab results found in last 7 days.  INR  Lab Results   Component Value Date    INR 2.00 09/25/2017    INR 2.10 09/24/2017    INR 2.64 09/24/2017     Lactate 8.7 -> 3.4 -> 2.2    Current Issues:  1.  Hypotension: cause of this shock state suspected to be liver failure based on story. Decrease fluids.  Wean vasopressors.  Extubate today  2.  GI bleed due to esophagitis: protonix, avoiding tubes   3.  Alcohol abuse with probably withdrawal: [facility] protocol  5.  Hyponatremia: slowly resolving, limit hypotonic fluid  6.  Liver failure: likely from alcoholic hepatitis.  Steroids started.  7.  Protein calorie malnutrition: swallow study  8.  Anemia: follow for need to transfuse such as insufficient oxygen delivery.    Evaluation and management time exclusive of procedures was 30 minutes critical care time including:  examination with the ICU team, discussion of the patient's condition with other physicians and members of the care team, reviewing all data related to the patient, and time utilizing the EMR for documentation of this patient's care.    Prosper Haro MD  Acute Care Surgery/Critical Care

## 2017-09-28 NOTE — CONSULTS
"CLINICAL NUTRITION SERVICES  -  ASSESSMENT NOTE      Recommendations Ordered by Registered Dietitian (RD):   Oral nutrition supplements  MVI+M   Future/Additional Recommendations:    Folic acid 1 mg, MVI+M, thiamine 100 mg when medically able   Malnutrition:     Severe malnutrition     REASON FOR ASSESSMENT  Jon Toribio is a 62 year old male seen by the dietitian for MD consult - malnutrition and alcoholic    NUTRITION HISTORY  - Information obtained from patient although very limited specifics obtained  - Patient is on a ?regular diet at home. Eats breakfast \"sometimes\" and unable to gather details on lunch or dinner meal. Denies supplement use. States he and significant other share meal preparation duties. ETOH abuse note. Also endorsed weight loss but unable to quantify  - Food allergies/intolerances: NKFA    CURRENT NUTRITION ORDERS  - Diet: Clear liquids (SLP to see this afternoon)  - Supplement: none  Current Intake/Tolerance:  - Per flow sheet review, no intake for meals documented x 4 days with intubation. NG unable to be placed due to esophagitis    PHYSICAL FINDINGS  Observed  Fat wasting:    Orbital region and surrounding area - hollow look    Upper arm region / triceps/biceps - very little space between folds with bone structure very pronounced  Muscle wasting:    Temple - moderate hollowing    Clavicle and acromion bone region: deltoid muscle - shoulder to arm joint look square with acromion protrusion prominent; clavicle pronounced    Dorsal hand / Interosseous Muscle - depressed area between thumb and forefinger    Patellar/quadriceps region - bones pronounced    Anterior thigh region - depression/line on thigh and obviously thin    Gastrocnemius/Posterior Calf region - not well developed    Gross atrophy and fragile skin, bruising  Obtained from Chart/Interdisciplinary Team  Stanley nutrition score: 2; total score: 12  Skin: fragile, multiple skin tears  Fluid status: +2-3 generalized and BLE " "edema, ascites  CIWA trending  Severe malnutrition    ANTHROPOMETRICS  Height: 5'10\"  Weight: 60 kg  BMI is 20.65 kg /m^2  Weight Status:  Normal BMI  IBW: 75.5 kg, 79% of IBW   Weight History:  Unable to evaluate with no recent weight documentation in EMR; weight has trended up since admit  Wt Readings from Last 10 Encounters:   09/28/17 65.6 kg (144 lb 10 oz)   10/03/06 66.2 kg (146 lb)   09/18/06 65.8 kg (145 lb)       LABS  Labs reviewed  Na 129 (L), LFT's elevated; P04 1.9 (L)     MEDICATIONS  Medications reviewed  Solu medrol; Levophed  Infuvite   D5 IVF at 50 mL per hour provides 204 kcal, 60 gm CHO    PROCEDURES WITH NUTRITIONAL IMPLICATIONS  9/24 - 9/28: intubated for airway protection    ASSESSED NUTRITION NEEDS (PER APPROVED PRACTICE GUIDELINES, Dosing weight: 60 Kg)  Estimated Energy Needs: 0320-2770+ kcals (30-35+ Kcal/Kg)  Justification: maintenance  Estimated Protein Needs:   grams protein (1.5-2 g pro/Kg)  Justification: maintenance  Estimated Fluid Needs: >1 mL/Kcal  Justification: maintenance    MALNUTRITION:  % Weight Loss: Unable to determine  % Intake:</= 50% for >/= 5 days (severe malnutrition) and suspect <75% for >1 month with ETOH abuse hx  Subcutaneous Fat Loss: Moderate to severe, as noted above  Muscle Loss: Moderate to severe, as noted above  Fluid Retention:Mild     Malnutrition Diagnosis: Severe malnutrition  In Context of:  Acute illness or injury and Environmental or social circumstances    NUTRITION DIAGNOSIS:  Malnutrition related to ETOH abuse and inadequate intake 2/2 upper GI bleed as evidenced by fat and muscle wasting, meeting <50% of estimated needs for > 5 days    INTERVENTIONS  Recommendations / Nutrition Prescription  Diet advancement per MD/SLP + Diet appropriate oral nutrition supplements to increase calorie and protein intake    Transition to oral MVI, thiamine and folic acid as medically appropriate    Implementation  Nutrition education: Not appropriate at this " time due to patient condition  Medical food supplement: diet appropriate supplements 10-2 as diet advances  Multivitamin/Minerals: Thera-Vit-M  Collaboration and Referral of care: Discussed patient during interdisciplinary care rounds this morning    Goals  Diet >/=FL within 24-48 hours    MONITORING AND EVALUATION:  Diet order - advancement, adequacy  Progress towards goals will be monitored and evaluated per protocol and Practice Guidelines      Caridad Gannon RDN LD, [facility]  3rd floor/ICU: [phone]  All other floors: [phone]  Weekend/holiday: [phone]  Office: [phone]

## 2017-09-28 NOTE — PROGRESS NOTES
Respiratory Therapy    Patient extubated at 09:04 this morning to a 3LPM Nc. Patient was deep suctioned and orally suctioned prior to extubation. Cuff was deflated, and the ETT was pulled. Patient's lung sounds are diminished, and is tolerating extubation well.. Will continue to assess and monitor.       Maddie Smart RRT  9/28/2017

## 2017-09-29 NOTE — PLAN OF CARE
Problem: Patient Care Overview  Goal: Plan of Care/Patient Progress Review  Outcome: Improving  ICU End of Shift Summary.  For vital signs and complete assessments, please see documentation flowsheets.      Pertinent assessments: Alert and pleasantly confused; cooperative and follows commands. LS dim; O2 via NC at 3LPM. VSS. Afebrile.   Major Shift Events: Incontinent of urine; brief in place. Tele SR 80s-100s. Restless in bed most of night; very little sleep.   Plan (Upcoming Events): Wean O2 as able  Discharge/Transfer Needs: Possible TCU placement at discharge?     Bedside Shift Report Completed :   Bedside Safety Check Completed:

## 2017-09-29 NOTE — PROGRESS NOTES
ICU Attending Note    Jon Toribio is admitted to ICU with hypotension, lactic acidosis, hypoglycemia, and GI bleed in the setting of alcohol abuse.  Has a history of substance abuse, depression, alcohol withdrawal..    Recent Events:  Remained extubated. No complaints this am.  Hungry    Exam:  Temp:  [97.4  F (36.3  C)-98.1  F (36.7  C)] 97.8  F (36.6  C)  Heart Rate:  [] 95  Resp:  [11-43] 43  BP: ()/(45-81) 119/64  MAP:  [73 mmHg-81 mmHg] 73 mmHg  Arterial Line BP: (119-121)/(52-59) 119/52  SpO2:  [79 %-100 %] 97 %    Intake/Output Summary (Last 24 hours) at 09/25/17 1149  Last data filed at 09/25/17 1000   Gross per 24 hour   Intake          2966.79 ml   Output              450 ml   Net          2516.79 ml     Ventilation Mode: CPAP/PS  FiO2 (%): 30 %  Rate Set (breaths/minute): 14 breaths/min  Tidal Volume Set (mL): 500 mL  PEEP (cm H2O): 5 cmH2O  Pressure Support (cm H2O): 5 cmH2O  Oxygen Concentration (%): 30 %  Resp: 43    Lungs clear  Heart rrr  abd taught, nontender, minimal tympany  Ext mild lower ext edema, palpable pedal pulses    Results:  CBC    Recent Labs  Lab 09/29/17  0545 09/28/17 0445 09/27/17  0500 09/26/17  0530   WBC 17.2* 17.8* 15.1* 10.1   HGB 8.0* 8.0* 8.3* 8.0*   HCT 23.9* 23.3* 23.7* 23.3*   * 116* 114* 104*     BMP    Recent Labs  Lab 09/29/17  0545 09/28/17 0445 09/27/17  0500 09/26/17  0530    129* 127* 128*   POTASSIUM 4.1 4.2 4.0 3.7   CHLORIDE 103 99 94 95   CO2 25 25 24 26   BUN 18 22 32* 37*   CR 0.44* 0.42* 0.52* 0.64*   * 92 151* 147*     LFT    Recent Labs  Lab 09/28/17 0445 09/27/17  0500 09/26/17  0530 09/25/17  0625 09/25/17  0600 09/24/17  2230 09/24/17  1716   * 154* 208*  --  249*  --  316*   ALT 65 58 49  --  42  --  46   ALKPHOS 212* 215* 186*  --  159*  --  195*   BILITOTAL 12.6* 11.3* 11.6*  --  8.3*  --  8.7*   ALBUMIN 1.6* 1.7* 1.7*  --  1.8*  --  1.8*   INR  --   --  1.85* 2.00*  --  2.10* 2.64*     PancreasNo lab  results found in last 7 days.  INR  Lab Results   Component Value Date    INR 2.00 09/25/2017    INR 2.10 09/24/2017    INR 2.64 09/24/2017     Lactate 8.7 -> 3.4 -> 2.2    Current Issues:  1.  Hypotension: resolved  2.  GI bleed due to esophagitis: protonix   3.  Alcohol abuse with probably withdrawal: [facility] protocol  5.  Hyponatremia: slowly resolving, limit hypotonic fluid  6.  Liver failure: likely from alcoholic hepatitis.  Steroids started.  Needs follow up plan.  7.  Protein calorie malnutrition: clears, advance  8.  Anemia: follow for need to transfuse such as insufficient oxygen delivery.    Prosper Haro MD  Acute Care Surgery/Critical Care

## 2017-09-29 NOTE — PROGRESS NOTES
[facility]  Hospitalist Progress Note  Keith Jefferson MD 09/29/17    Reason for Stay (Diagnosis): respiratory failure, etoh withdrawal         Assessment and Plan:      Summary of Stay: Jon Toribio is a 62 year old male with hx of etoh abuse with dependence and hx of withdrawal who has been drinking more lately and was found confused by a family member.  He then went unresponsive so the son started CPR and called EMS.  BG 39 for EMS.  He was in shock with lactic acidosis of 16 in the ED.  He was intubated for airway protection as he was obtunded and admitted to the ICU on 9/24/2017.  He had brinda melena and was put on octreotide gtt, protonix gtt, and ceftriaxone for presumed UGIB an possibility of varices.  GI consulted and he had EGD 9/5 showing severe esophagitis with a superficial esophageal tear.  No NG/OG per GI.  Initial labs show acute hepatitis presumably from etoh so he was started on methylprednisolone for a high DFscore.  Ammonia level was up that resolved with lactulose enemas.  His sedation was weaned and passed PST so extubated morning of 9/28.  Has evidence for malnutrition and multiple electrolyte deficiencies that are being replaced.  Advancing diet to DD2 per SLP.  So far no recurrent bleeding.  Transfer to medical floor for ongoing PT/OT and follow up of hepatitis.    Problem List/Assessment and Plan:   Acute encephalopathy: obtunded on arrival, possibly from alcohol withdrawal and hepatic encephalopathy, but cannot say for sure as he just received CPR.  Etoh level negative.  Suppose seizure also in differential given heavy etoh use.  Initially did not require sedation despite intubation/mechanical ventilation.  Head CT without acute findings.  Following commands now and extubated, so will follow mental status here.  Improving, but suspect still not at baseline and unclear how he would manage at home.    Shock due to UGI Bleed and underlying cirrhosis:  Weanedoff  norepinephrine and vasopressin. Suspect a low BP at baseline from cirrhosis.    Acute blood loss anemia:  Rec'd 2 U PRBCs here.  Hg now stable without evidence for ongoing bleeding.    UGI Bleed:  Melena on admit.  Secondary to severe esophagitis and superficial esophageal tear.  -bid ppi    Acute on chronic alcoholic hepatitis:  Bilirubin 11-12, direct fraction is 9.6 so all likely liver related.  ALT wnl, and AST rending down from 315.  Alk phos only minimally elevated. Liver shows fatty infiltration on CT but ascites is present.  I suspect some degree of cirrhosis.  INR elevated 2.0 range initially, some of which may be nutritional.  DF is > 100 so started methylprednisolone on 9/25/17.  -[facility] consulted, appreciate recs  -change to po prednisolone 40mg daily   -repeat Lille score 10/1 to see if responding to steroids    PENNY:  Looks all prerenal.  Continue low rate MIVF-but will likely 3rd space in setting of hypoalbuminemia, cirrhosis.  Creatinine normalized.    Alcohol abuse and dependence:  It is suspected he was drinking 1L alcohol per day.  Possible some withdrawal earlier on and will need to monitor for this now off sedation.  -CIWA with prn ativan, has not needed any today and likely is over withdrawal    Pulmonary secretions:  Small opacity LLL on chest xray 9/27.  At risk for aspiration, still without fevers, WBC rising which is probably steroid effect.  Procal 0.5.  No abx for now.    Low K, mag, PO4, Ca: replacement protocols    Hyponatremia, resolved: 120 on admit now Na 15and stable.  Will monitor as his diet is advanced.  Suspect some component of his liver disease.  Has 1-2+ LE edema and some ascites.  Currently on D51/2NS at 50ml/hr that will go to TKO when po intake improves.  -daily bmp    Coagulopathy:  INR 2.64 on admit.  Likely combination of liver disease and nutritional deficiency.  Down to 1.85 most recently.    Severe malnutrition: suspect protein calore malnutrition.  -dietician  consulted    DVT Prophylaxis: Pneumatic Compression Devices, no heparin for GI bleed  Code Status: Full Code  FEN: will try clears today if passes SLP, but not advance further today due to his severe esophagitis  Discharge Dispo: tbd, PT and OT consulted  Estimated Disch Date / # of Days until Disch: tbd, plan to repeat lille score testing 10/1 to see if responding to steroids.  Transfer to medical floor today.          Interval History (Subjective):      No acute events overnight.  Eating some, but not a lot.  Really has slept most of day.  Denies pain or sob.  Knows he is in the hospital.  No melena or vomiting.  Street and femoral CVC removed.                  Physical Exam:      Last Vital Signs:  /58  Pulse 118  Temp 98.2  F (36.8  C) (Oral)  Resp 18  Wt 62.2 kg (137 lb 2 oz)  SpO2 95%      Intake/Output Summary (Last 24 hours) at 09/28/17 1057  Last data filed at 09/28/17 1000   Gross per 24 hour   Intake          1943.56 ml   Output             3005 ml   Net         -1061.44 ml       Constitutional: Awake, NAD  Eyes: sclera yellow  HEENT:  MMM  Respiratory: lungs clear without crackles or wheeze  Cardiovascular: RRR.  No murmur   GI: thin, non-tender, not distended, bowel sounds present  Skin: bruising to R forearm  Musculoskeletal/extremities: 1-2+ bilateral LE pitting edema  Neurologic: somnolent, but wakes up and answers questions.  Moves all extremities equally  Psychiatric: calm          Medications:      All current medications were reviewed with changes reflected in problem list.         Data:      All new lab and imaging data was reviewed.   Labs:    Recent Labs  Lab 09/29/17  0545 09/28/17  0445 09/27/17  0500   WBC 17.2* 17.8* 15.1*   HGB 8.0* 8.0* 8.3*   HCT 23.9* 23.3* 23.7*   MCV 98 96 94   * 116* 114*       Recent Labs  Lab 09/29/17  0545 09/28/17  0445 09/27/17  0500 09/26/17  1334 09/26/17  0530    129* 127*  --  128*   POTASSIUM 4.1 4.2 4.0  --  3.7   CHLORIDE 103 99 94   --  95   CO2 25 25 24  --  26   ANIONGAP 7 5 9  --  7   * 92 151*  --  147*   BUN 18 22 32*  --  37*   CR 0.44* 0.42* 0.52*  --  0.64*   GFRESTIMATED >90 >90 >90  --  >90   GFRESTBLACK >90 >90 >90  --  >90   HILTON 7.1* 6.6* 6.1*  --  5.6*   MAG  --   --  2.4* 2.8* 1.5*   PHOS 2.3* 1.9* 1.9* 2.2* 1.4*   PROTTOTAL  --  4.5* 4.7*  --  4.5*   ALBUMIN  --  1.6* 1.7*  --  1.7*   BILITOTAL  --  12.6* 11.3*  --  11.6*   ALKPHOS  --  212* 215*  --  186*   AST  --  140* 154*  --  208*   ALT  --  65 58  --  49      Imaging:   None today      Keith Jefferson MD

## 2017-09-29 NOTE — PROGRESS NOTES
"   09/29/17 1015   Quick Adds   Type of Visit Initial Occupational Therapy Evaluation   Living Environment   Lives With child(renata), adult   Living Arrangements house   Number of Stairs Within Home 12  (split level)   Stair Railings at Home inside, present at both sides   Transportation Available car;family or friend will provide   Living Environment Comment Pt reports living with his adult son who works. Pt is retired.    Self-Care   Usual Activity Tolerance good   Current Activity Tolerance fair   Regular Exercise no   Equipment Currently Used at Home none   Activity/Exercise/Self-Care Comment Pt reports hunting/fishing, chopping wood, and relaxing at home are his hobbies.   Functional Level Prior   Ambulation 0-->independent   Transferring 0-->independent   Toileting 0-->independent   Bathing 0-->independent   Dressing 0-->independent   Eating 0-->independent   Prior Functional Level Comment Pt reports being IND with mobility and ADLs, completes own laundry, finances, and medication management. Pt drives. Son A with cleaning and cooking.    General Information   Onset of Illness/Injury or Date of Surgery - Date 09/29/17   Referring Physician Ronny   Patient/Family Goals Statement \"to get this over with\"   Additional Occupational Profile Info/Pertinent History of Current Problem Jon Toribio is a 62 year old male with hx of etoh abuse with dependence and hx of withdrawal who has been drinking more lately and was found confused by a family member.  He then went unresponsive so the son started CPR and called EMS.  BG 39 for EMS.  He was in shock with lactic acidosis of 16 in the ED.  He was intubated for airway protection as he was obtunded and admitted to the ICU on 9/24/2017.  He had brinda melena and was put on octreotide gtt, protonix gtt, and ceftriaxone for presumed UGIB an possibility of varices.  GI consulted and he had EGD 9/5 showing severe esophagitis with a superficial esophageal tear.  No NG/OG per " GI.  Initial labs show acute hepatitis presumably from etoh so he was started on methylprednisolone for a high DFscore.  Ammonia level was up that resolved with lactulose enemas.  His sedation was weaned and passed PST so extubated morning of 9/28.  Trial of clears and monitor for bleeding.  Has evidence for malnutrition and multiple electrolyte deficiencies that are being replaced.   Precautions/Limitations fall precautions   General Observations Pt agreeable to OT.   Cognitive Status Examination   Orientation person;place   Level of Consciousness alert;confused   Able to Follow Commands mild impairment   Attention Distractible during evaluation   Organization/Problem Solving Problem solving impaired   Cognitive Comment Pt able to respond to commands 90% of the time. Often stares and requires increased time to process. SBT compelted, pt scored 14 indicating moderate impairement.    Pain Assessment   Patient Currently in Pain No   Range of Motion (ROM)   ROM Comment Pt poorly positioned in bed, refused re-positioning. Noted limited shoulder and elbow ROM B.    Strength   Strength Comments Unable to assess, pt appears frail.    Mobility   Bed Mobility Comments Pt refused repositioning   Transfer Skills   Transfer Comments Pt declined OOB   Instrumental Activities of Daily Living (IADL)   Previous Responsibilities laundry;shopping;medication management;finances;driving   Activities of Daily Living Analysis   Impairments Contributing to Impaired Activities of Daily Living cognition impaired;balance impaired;strength decreased   General Therapy Interventions   Planned Therapy Interventions ADL retraining;IADL retraining;cognition;strengthening;transfer training;progressive activity/exercise   Clinical Impression   Criteria for Skilled Therapeutic Interventions Met yes, treatment indicated   OT Diagnosis Impaired ADLs/IADLs, functional mobility, safety/cognition   Influenced by the following impairments Impaired  "cognition, anticipated impaired balance/mobility   Assessment of Occupational Performance 3-5 Performance Deficits   Identified Performance Deficits dressing, toileting, bathing, safety/cognition with IADLS/driving.   Clinical Decision Making (Complexity) Low complexity   Therapy Frequency daily   Predicted Duration of Therapy Intervention (days/wks) 1 week   Anticipated Discharge Disposition Transitional Care Facility;Home with Assist;Home with Home Therapy  (peneding progress)   Risks and Benefits of Treatment have been explained. Yes   Patient, Family & other staff in agreement with plan of care Yes   [facility] TM \"6 Clicks\"   2016, Trustees of [facility], under license to [employer].  All rights reserved.   6 Clicks Short Forms Daily Activity Inpatient Short Form   [facility]  \"6 Clicks\" Daily Activity Inpatient Short Form   1. Putting on and taking off regular lower body clothing? 2 - A Lot   2. Bathing (including washing, rinsing, drying)? 2 - A Lot   3. Toileting, which includes using toilet, bedpan or urinal? 2 - A Lot   4. Putting on and taking off regular upper body clothing? 3 - A Little   5. Taking care of personal grooming such as brushing teeth? 3 - A Little   6. Eating meals? 4 - None   Daily Activity Raw Score (Score out of 24.Lower scores equate to lower levels of function) 16   Total Evaluation Time   Total Evaluation Time (Minutes) 17     "

## 2017-09-29 NOTE — PLAN OF CARE
Problem: Goal Outcome Summary  Goal: Goal Outcome Summary  OT: Orders received, limited eval completed.  Pt is a 62 year old male with hx of etoh abuse with dependence and hx of withdrawal who has been drinking more lately and was found confused by a family member.  He then went unresponsive so the son started CPR and called EMS. Pt was in shock and was intubated and admitted to the ICU on 9/24/2017. Pt extubated morning of 9/28.  Pt lives with adult son in split level home, stairs to enter. Pt reports being IND with mobility, ADLS, and medication management, financial management, driving, and laundry. Pt has A with cooking and cleaning.      Discharge Planner OT   Patient plan for discharge: not stated  Current status: Pt alert/confused. Oriented to year and place. Not to date/month or situation. Short Blessed Test: 14 errors indicating moderate impairment. Pt refused repositioning in bed, bed mobility, or any ADLs/transfers at this time.   Barriers to return to prior living situation: Stairs, impaired cognition/safety. Level of A  Recommendations for discharge: Pending pts progress and cognitive/safety, TCU vs home with increased A and home care.  Rationale for recommendations: Pt appears below baseline per interview. Recommend further skilled OT to increase functional IND and safety with ADLs and address concerns with cognition and functional strength/mobility.       Entered by: Clarice New 09/29/2017 10:54 AM

## 2017-09-29 NOTE — PLAN OF CARE
Problem: Goal Outcome Summary  Goal: Goal Outcome Summary  PT: Orders received, eval completed, treatment initiated. Pt admitted after being found unresponsive by his son, son initiated CPR until EMS arrived.  Pt was intubated for airway protection and successfully extubated yesterday morning.  Pt is oriented this session x3, reports he lives in a split level home with his son.  Denies use of AD with mobility at baseline.     Discharge Planner PT   Patient plan for discharge: home  Current status: max A x1-2 for supine <> sit; mod A x2 for sit <> stand with FWW x3 reps, attempted stand pivot transfer to commode but pt unable to perform safely; recommend nursing staff to use ceiling lift at this time  Barriers to return to prior living situation: limited help at home (per OT's note, son pt lives with works during the day); falls risk, heavy assistance required for all mobility, poor insight into deficits  Recommendations for discharge: TCU  Rationale for recommendations: Pt would benefit from continued skilled PT intervention to progress IND and safety with functional mobility       Entered by: Dorina Brooks 09/29/2017 5:16 PM

## 2017-09-29 NOTE — PROGRESS NOTES
" 09/29/17 1600   Quick Adds   Type of Visit Initial PT Evaluation   Living Environment   Lives With child(renata), adult   Living Arrangements house   Number of Stairs Within Home 12  (split level)   Stair Railings at Home inside, present at both sides   Living Environment Comment Pt reports living with his adult son who work.  Pt is retired   Self-Care   Usual Activity Tolerance good   Current Activity Tolerance fair   Regular Exercise no   Equipment Currently Used at Home none   Functional Level Prior   Ambulation 0-->independent   Transferring 0-->independent   Toileting 0-->independent   Bathing 0-->independent   Dressing 0-->independent   Eating 0-->independent   Fall history within last six months yes   Number of times patient has fallen within last six months (\"Apparently, 1 fall\")   Which of the above functional risks had a recent onset or change? ambulation;transferring   Prior Functional Level Comment Pt reports being IND with mobility and ADLs, completes own laundry, finances, and medication management. Pt drives. Son A with cleaning and cooking.    General Information   Onset of Illness/Injury or Date of Surgery - Date 09/24/17   Referring Physician Keith Jefferson MD   Patient/Family Goals Statement to go home   Pertinent History of Current Problem (include personal factors and/or comorbidities that impact the POC) Pt is a 62 year old male with hx of etoh abuse with dependence and hx of withdrawal who has been drinking more lately and was found confused by a family member.  He then went unresponsive so the son started CPR and called EMS.  BG 39 for EMS.  He was in shock with lactic acidosis of 16 in the ED.  He was intubated for airway protection as he was obtunded and admitted to the ICU on 9/24/2017.  He had brinda melena and was put on octreotide gtt, protonix gtt, and ceftriaxone for presumed UGIB an possibility of varices.  GI consulted and he had EGD 9/5 showing severe esophagitis with a " superficial esophageal tear.  No NG/OG per GI.  Initial labs show acute hepatitis presumably from etoh so he was started on methylprednisolone for a high DFscore.  Ammonia level was up that resolved with lactulose enemas.  His sedation was weaned and passed PST so extubated morning of 9/28   Precautions/Limitations fall precautions   General Info Comments Activity: Up ad chris    Cognitive Status Examination   Orientation orientation to person, place and time   Level of Consciousness alert   Follows Commands and Answers Questions 75% of the time   Personal Safety and Judgment impaired   Memory impaired   Pain Assessment   Patient Currently in Pain No   Integumentary/Edema   Integumentary/Edema Comments Pitting edema in LLE noted up to knee   Posture    Posture Forward head position;Protracted shoulders   Range of Motion (ROM)   ROM Comment WFL in BLE   Strength   Strength Comments Grossly 3/5 in BLE as observed through performance of supine ex   Bed Mobility   Bed Mobility Comments max A for supine <> sit   Transfer Skills   Transfer Comments mod A x2 for sit <> stand with FWW   Gait   Gait Comments unable to perform this date   Balance   Balance Comments Fair static sitting balance, poor static standing balance   Sensory Examination   Sensory Perception Comments denies N/T   General Therapy Interventions   Planned Therapy Interventions balance training;bed mobility training;gait training;ROM;strengthening;stretching;transfer training;risk factor education;home program guidelines;progressive activity/exercise   Clinical Impression   Criteria for Skilled Therapeutic Intervention yes, treatment indicated   PT Diagnosis decreased functional mobility   Influenced by the following impairments weakness, cognition, post-extubation   Functional limitations due to impairments bed mobility, transfers, ambulation, stair climbing   Clinical Presentation Stable/Uncomplicated   Clinical Presentation Rationale stable clinical  "picture this date (pain, vitals, neuro)   Clinical Decision Making (Complexity) Low complexity   Therapy Frequency` daily   Predicted Duration of Therapy Intervention (days/wks) 5 days   Anticipated Discharge Disposition Transitional Care Facility   Risk & Benefits of therapy have been explained Yes   Patient, Family & other staff in agreement with plan of care Yes   [facility] TM \"6 Clicks\"   2016, Trustees of [facility], under license to [employer].  All rights reserved.   6 Clicks Short Forms Daily Activity Inpatient Short Form   [facility]  \"6 Clicks\" Daily Activity Inpatient Short Form   1. Putting on and taking off regular lower body clothing? 2 - A Lot   2. Bathing (including washing, rinsing, drying)? 2 - A Lot   3. Toileting, which includes using toilet, bedpan or urinal? 2 - A Lot   4. Putting on and taking off regular upper body clothing? 3 - A Little   5. Taking care of personal grooming such as brushing teeth? 3 - A Little   6. Eating meals? 4 - None   Daily Activity Raw Score (Score out of 24.Lower scores equate to lower levels of function) 16   Total Evaluation Time   Total Evaluation Time (Minutes) 9     "

## 2017-09-29 NOTE — PROGRESS NOTES
GASTROENTEROLOGY PROGRESS NOTE        SUBJECTIVE:  Alert, orientated to person, place, and time. Denies abdominal pain.         OBJECTIVE:    /64  Pulse 118  Temp 97.8  F (36.6  C) (Oral)  Resp (!) 43  Wt 62.2 kg (137 lb 2 oz)  SpO2 97%  Temp (24hrs), Av.9  F (37.7  C), Min:98.8  F (37.1  C), Max:100.8  F (38.2  C)    Patient Vitals for the past 72 hrs:   Weight   17 0000 62.2 kg (137 lb 2 oz)   17 0000 65.6 kg (144 lb 10 oz)   17 0000 62.2 kg (137 lb 2 oz)       Intake/Output Summary (Last 24 hours) at 17 0945  Last data filed at 17 0800   Gross per 24 hour   Intake          2350.19 ml   Output             2350 ml   Net             0.19 ml        PHYSICAL EXAM     Constitutional: NAD, + jaundice  Cardiovascular: Tachy, normal S1, S2, no murmur appreciated   Respiratory: poor transmission, decreased bilaterally- ant chest.  Abdomen: soft, mild distention, non-tender  LE: 1 + LE edema  Neuro: no asterixis, able to follow commands      Additional Comments:  ROS, FH, SH: See initial GI consult for details.     I have reviewed the patient's new clinical lab results:     Recent Labs   Lab Test  17   0545  17   0445  17   0500  17   0530   17   0625   17   2230   WBC  17.2*  17.8*  15.1*  10.1   --    --    < >  10.6   HGB  8.0*  8.0*  8.3*  8.0*   < >   --    < >  8.0*   MCV  98  96  94  94   --    --    < >  97   PLT  108*  116*  114*  104*   --    --    < >  85*   INR   --    --    --   1.85*   --   2.00*   --   2.10*    < > = values in this interval not displayed.     Recent Labs   Lab Test  17   0545  17   0445  17   0500   POTASSIUM  4.1  4.2  4.0   CHLORIDE  103  99  94   CO2  25  25  24   BUN  18  22  32*   ANIONGAP  7  5  9     Recent Labs   Lab Test  17   0445  17   0500  17   0530   17   2141   ALBUMIN  1.6*  1.7*  1.7*   < >   --    BILITOTAL  12.6*  11.3*  11.6*   < >   --    ALT  65  58   49   < >   --    AST  140*  154*  208*   < >   --    PROTEIN   --    --    --    --   30*    < > = values in this interval not displayed.     IMAGING    CT CHEST, ABDOMEN, PELVIS WITHOUT CONTRAST 9/24/2017 6:45 PM       IMPRESSION:  1. Nonspecific peritoneal fluid within the abdomen and pelvis.  2. Evaluation of the bowel is suboptimal due to lack of contrast  enhancement. Diffuse bowel wall thickening may be present. There is no  definitive evidence of bowel obstruction. No free peritoneal air.  3. Severe hepatic fatty infiltration.  4. Bilateral posterior dependent pulmonary mild atelectasis.      ASSESSMENT/ PLAN  Jon Toribio is a 61 y/o male with hx EtOH abuse, on-going found unresponsive at home.  He was extubated 9/28 and being managed in the ICU.  GI was consulted for dark stool and possible hematemesis.     1.  Upper GI bleed: The setting of chronic alcohol use.  EGD revealing grade D reflux esophagitis with a superficial mucosal tear in the upper third of the esophagus.  He was initially started on octreotide but this stopped.  He is to continue on PPI therapy. INR 1.85 (9/26)on admission. HgB 8. No further evidence of overt GI bleeding. Okay for feeding tube if needed per Dr. Ospina.      2. EtOH intoxication with EtOH hepatitis: AST greater than ALT.  Total bilirubin on admission was eight.  Now has climbed to 11.6.  INR improving at 1.85.  Platelets are 104,000  Patient was started on lactulose enemas as his ammonia was elevated.  CT imaging with fatty liver but no description of cirrhosis.  His discriminate function was elevated so he was started on prednisolone 40 mg daily per medicine team.     -- Discriminate function improving. Steroids started 9/25 Day 7 steroids Oct 1st, calculate Lille score.  -- MELD 22.9      Discussed with Dr. Ospina.    Naa Lai PA-C  [facility]

## 2017-09-29 NOTE — PLAN OF CARE
Problem: Goal Outcome Summary  Goal: Goal Outcome Summary  Discharge Planner SLP   Patient plan for discharge: none stated  Current status: Per MD, OK to trial all textures this date. Pt tolerated thin liquids via cup and pureed textures with no overt s/sx of aspiration. Semisolid textures resulted in delayed cough x1; suspect d/t pharyngeal residuals. Recommend advance to dysphagia diet 2 and thin liquids with intermittent supervision. Encourage pt to sit fully upright for all PO, take small single sips/bites, pace self, and alternate between consistencies.   Barriers to return to prior living situation: dysphagia  Recommendations for discharge: ongoing ST for dysphagia   Rationale for recommendations: suspect pt is not yet at baseline diet level; dysphagia        Entered by: Yari Fagan 09/29/2017 2:22 PM

## 2017-09-30 NOTE — PLAN OF CARE
Problem: Goal Outcome Summary  Goal: Goal Outcome Summary  Outcome: Improving  Pt alert, oriented.  VSS.  CIWA scores low, calls out.  Jaundiced, yellow sclera, bruises.   Weeping from line sites.  Edema.  Wounds to L arm, dressing intact.

## 2017-09-30 NOTE — PLAN OF CARE
Problem: Goal Outcome Summary  Goal: Goal Outcome Summary  Outcome: No Change  VSS, pt on 2L/NC, desats to mid 80s on RA.  Jaundiced and ecchymotic, with skin tears on L arm.  Alert, disoriented to time.  On CIWA protocol but low scores this shift.  LS-dim.  BLLE, legs elevated.  Pt continues on steroid.  Assist x2 with durga steady.  Very poor appetite, glucose checks.  lille score testing tomorrow to see if responding to steroids.

## 2017-09-30 NOTE — PROGRESS NOTES
[facility]    Sepsis Evaluation Progress Note    Date of Service: 09/30/2017    I entered the chart of Jon Toribio to follow up labs as I am taking care of him today.  Apparently he had abnormal vital signs triggering the Sepsis SIRS screening alert. He is not known to have an infection.     Physical Exam    Vital Signs:  Temp: 96.9  F (36.1  C) Temp src: Oral BP: (!) 137/93 Pulse: 95 Heart Rate: 99 Resp: 24 SpO2: 94 % O2 Device: Nasal cannula Oxygen Delivery: 2 LPM    Lab:  Lactic Acid   Date Value Ref Range Status   09/30/2017 2.6 (H) 0.7 - 2.0 mmol/L Final       The patient is at baseline mental status.    The rest of their physical exam is significant for jaundice, no crackles, no tachycardia.    Assessment and Plan    The SIRS and exam findings are likely due to acute alcoholic hepatitis and liver failure, there is no sign of sepsis at this time.    Disposition: The patient will remain on the current unit. We will continue to monitor this patient closely.    Keith Jefferson MD

## 2017-09-30 NOTE — PLAN OF CARE
Problem: Patient Care Overview  Goal: Plan of Care/Patient Progress Review  Outcome: Improving  ICU End of Shift Summary.  For vital signs and complete assessments, please see documentation flowsheets.      Pertinent assessments: CIWA scoring low, no ativan given today. Pt calm and cooperative.  Major Shift Events: Diet advanced to DD2 with thin liquids. Tolerating diet well. VSS. Not tolerating activity today.  Plan (Upcoming Events):Transfer  To floor when bed available.  Discharge/Transfer Needs:      Bedside Shift Report Completed : yes  Bedside Safety Check Completed:yes

## 2017-09-30 NOTE — PROGRESS NOTES
GI Attending  No new recommendations at this time.  Lille score calculation tomorrow.    Prosper Scuhlz MD  [facility]  [phone] Cell  [phone]  Office

## 2017-09-30 NOTE — PLAN OF CARE
Problem: Patient Care Overview  Goal: Plan of Care/Patient Progress Review  Discharge Planner OT   Patient plan for discharge: TCU  Current status: Pt alert, oriented to person, place, month, year, and current/past President. Pt agreeable to bed mobility, able to complete log roll with MAX Ax1, sidelying to sit with MOD Ax2. Pt able to scoot forawrd for B feet on floor with increased time to process cues. Pt able to sit with S/SBA. Son washed pts back. Pt requested to use urinal, able to complete with set up. Sit/stand with MAX Ax2, unable to take steps to pivot. Lili Steady used with MAX A x2-3 to stand, MOD A x2 to sit and scoot back in chair. Pt on 2L of 02. Reports no pain or SOB. Tabs donned by nursing staff. Pt educated on call light and to trial sitting up for 30 mins, pt and family verbalized understanding.   Barriers to return to prior living situation: Lili Steady for transfers, Dep mobility. Cognition  Recommendations for discharge: TCU  Rationale for recommendations: Continued skilled OT recommended to address cognition and ADLs.       Entered by: Clarice New 09/30/2017 12:01 PM

## 2017-09-30 NOTE — PLAN OF CARE
Problem: Patient Care Overview  Goal: Plan of Care/Patient Progress Review     SLP - Attempt swallow f/u. Pt with another care provider. Will f/u later today or tomorrow as schedule permits. No concerns with diet tolerance reported in chart.

## 2017-09-30 NOTE — PROGRESS NOTES
[facility]  Hospitalist Progress Note  Keith Jefferson MD 09/30/17    Reason for Stay (Diagnosis): respiratory failure, etoh withdrawal         Assessment and Plan:      Summary of Stay: Jon Toribio is a 62 year old male with hx of etoh abuse with dependence and hx of withdrawal who has been drinking more lately and was found confused by a family member.  He then went unresponsive so the son started CPR and called EMS.  BG 39 for EMS.  He was in shock with lactic acidosis of 16 in the ED.  He was intubated for airway protection as he was obtunded and admitted to the ICU on 9/24/2017.  He had brinda melena and was put on octreotide gtt, protonix gtt, and ceftriaxone for presumed UGIB an possibility of varices.  GI consulted and he had EGD 9/5 showing severe esophagitis with a superficial esophageal tear.  No NG/OG per GI.  Initial labs show acute hepatitis presumably from etoh so he was started on methylprednisolone for a high DFscore.  Ammonia level was up that resolved with lactulose enemas.  His sedation was weaned and passed PST so extubated morning of 9/28.  Has evidence for malnutrition and multiple electrolyte deficiencies that are being replaced.  Advancing diet to DD2 per SLP.  So far no recurrent bleeding.  Transferred to medical floor for ongoing PT/OT and follow up of hepatitis.  Repeat Lille score to determine if steroids helping.  Likely to need TCU. ADDENDUM:  Rising bilirubin late today.  Continue steroids and repeat tomorrow.     Problem List/Assessment and Plan:   Acute encephalopathy: obtunded on arrival, possibly from alcohol withdrawal and hepatic encephalopathy, but cannot say for sure as he just received CPR.  Etoh level negative.  Suppose seizure also in differential given heavy etoh use.  Initially did not require sedation despite intubation/mechanical ventilation.  Head CT without acute findings.  Improving, but suspect still not at baseline and unclear how he would manage  at home.    Shock due to UGI Bleed and underlying cirrhosis:  Weanedoff norepinephrine and vasopressin. Suspect a low BP at baseline from cirrhosis.    Acute blood loss anemia:  Rec'd 2 U PRBCs here.  Hg now stable without evidence for ongoing bleeding.    UGI Bleed:  Melena on admit.  Secondary to severe esophagitis and superficial esophageal tear.  -bid ppi now and on discharge    Acute on chronic alcoholic hepatitis:  Bilirubin 11-12, direct fraction is 9.6 so all likely liver related.  ALT wnl, and AST rending down from 315.  Alk phos only minimally elevated. Liver shows fatty infiltration on CT but ascites is present.  I suspect some degree of cirrhosis.  INR elevated 2.0 range initially, some of which may be nutritional.  DF is > 100 so started methylprednisolone on 9/25/17.  -[facility] consulted, appreciate recs  -prednisolone 40mg daily   -repeat Lille score tomorrow (labs ordered) to see if responding to steroids  ADDENDUM:  Patient with bilirubin up to 19 today and increase in transaminases.  There has been no significant change in patient vital signs.  No clear etiology for rise other than evolution of his alcoholic hepatitis and likely not responding to steroids.  If continues to rise, overall prognosis would be poor.    Elevated lactate:   Lactate 2.5, apparently checked for meeting sirs protocol.  This is secondary to his liver failure due to alcoholic hepatitis.  His WBC is elevated in setting of high dose steroids.  No fever and vitals are stable.  No fluids given his anasarca and this more likely secondary to his liver disease.  No abx for now.      PENNY:  Looks all prerenal.  Continue low rate MIVF-but will likely 3rd space in setting of hypoalbuminemia, cirrhosis.  Creatinine normalized.    Alcohol abuse and dependence:  It is suspected he was drinking 1L alcohol per day.  Possible some withdrawal earlier in admission.  -d/c CIWA as no sign of withdrawal now and no recent ativan    Pulmonary secretions:  " Small opacity LLL on chest xray 9/27.  At risk for aspiration, still without fevers, WBC rising which is probably steroid effect.  Procal 0.5.  No abx for now.  Seems better.    Low K, mag, PO4, Ca: replacement protocols    Hyponatremia, resolved: 120 on admit now Na 15and stable.  Will monitor as his diet is advanced.  Suspect some component of his liver disease.  Has 1-2+ LE edema and some ascites.     Coagulopathy:  INR 2.64 on admit.  Likely combination of liver disease and nutritional deficiency.  Down to 1.85 most recently.  -PT/INR tomorrow for Lille calculation    Dysphagia:  SLP on board    Severe malnutrition: suspect protein calore malnutrition.  -dietician consulted  -will need to follow his intake to make sure does not need tube feeds, if he is interested in this anyway. Have been given ok from GI to place FT if he were to need this     DVT Prophylaxis: Pneumatic Compression Devices, no heparin for GI bleed  Code Status: Full Code  FEN: DD2 with thin liquids. SLP  Discharge Dispo: suspect will need TCU, PT and OT consulted  Estimated Disch Date / # of Days until Disch:  Lille score testing tomorrow to see if responding to steroids.  D/c tbd.  If bilirubin rising this would infer poor prognosis moving forward.        Interval History (Subjective):      Transferred from ICU this morning.  Quite weak with PT and may need TCU.  Some confusion ongoing but overall improved.  He feels like his legs are weak, but arms feel good.  Appetite improving.  \"always\" feels a little sob.                  Physical Exam:      Last Vital Signs:  /59 (BP Location: Left arm)  Pulse 95  Temp 96.9  F (36.1  C) (Oral)  Resp 24  Wt 62.2 kg (137 lb 2 oz)  SpO2 92%      Intake/Output Summary (Last 24 hours) at 09/30/17 0823  Last data filed at 09/30/17 0200   Gross per 24 hour   Intake          1175.83 ml   Output              175 ml   Net          1000.83 ml       Constitutional: Awake, NAD  Eyes: sclera " yellow  HEENT:  MMM  Respiratory: lungs clear without crackles or wheeze  Cardiovascular: RRR.  No murmur   GI: thin, non-tender, not distended, bowel sounds present  Skin: bruising to R forearm  Musculoskeletal/extremities: 1-2+ bilateral LE pitting edema  Neurologic: somnolent, but wakes up and answers questions.  Moves all extremities equally  Psychiatric: calm          Medications:      All current medications were reviewed with changes reflected in problem list.         Data:      All new lab and imaging data was reviewed.   Labs:  Cbc and cmp yet to be drawn.  BG 100s now down to 80s    Imaging:   None today      Keith Jefferson MD

## 2017-10-01 NOTE — PROGRESS NOTES
GI Attending  Case discussed with hospitalist via telephone and patient's nurse on the floor.  Patient is a non-responder to steroids for his alcoholic hepatitis given calculated Lille score.  He denies any abdominal pain or nausea; lethargic.    Tmax 98.3  BP 84/57    RR 18  Patient awakens to stimuli; denies pain  Obvious scleral icterus and jaundice  Tachycardic, but regular. No murmur  Decreased breath sounds bilaterally; no rhonchi  Distended; positive bowel sounds appreciated.  No brinda rebound or guarding  +Edema  Patient did not cooperate for asterixis testing    Labs.  Lille Score > 0.45   T. Migue 20.9  AlkP 386     WBC 23.7  Hgb 9.2  I NR 2.01  Creatinine 1.11    Impression:  Severe alcoholic hepatitis.  Non- responder to steroids with today's Lille score; steroids stopped.  Prognosis is very poor at this time.  He is not a liver transplant candidate given active ethanol consumption; no benefit in  transferring patient to [facility].  At this time, I would recommend hospice/palliative care consultation Monday AM.  Please call anytime with further questions.    Prosper Schulz MD  [facility]  [phone] Cell  [phone]  Office

## 2017-10-01 NOTE — PLAN OF CARE
Problem: Goal Outcome Summary  Goal: Goal Outcome Summary  Outcome: No Change  3618 Text page to MD: FYLUCIANO pt triggered sepsis protocol, result was 2.8. Please advise. THank you     Blood cultures, CXR and urine culture ordered.      BP soft this shift.  Pt jaundiced and ecchymotic.  Pt mobility declined today, currently using lift to move pt. LS-dim.  Ascites, BLLE.  Dilaudid x1, pt c/o abd pain with breathing. GI consulted and recommended palliative/hospice consult.   spoke to with son and friend Damari regarding poor prognosis.  Pt has no appetite whatsoever.

## 2017-10-01 NOTE — PLAN OF CARE
Problem: Goal Outcome Summary  Goal: Goal Outcome Summary  OT: Attempted to see pt this AM, pt very lethargic- able to open his eyes but then falls back to sleep. Noted breakfast tray untouched. Nursing aware.

## 2017-10-01 NOTE — PLAN OF CARE
Problem: Patient Care Overview  Goal: Plan of Care/Patient Progress Review  Outcome: No Change  A/O. Generalized weakness. CIWA neg.  Jaundice significant. A2 with durga steady. Incontinent of urine. Skin fragile, skin tears dressed to L arm. Generalized edema. Abdomen distended and firm(aciteis). Pt had a R  femoral line previously during stay, area is draining large amount of yellow serous fluid, dressed with gauze and kirlex. Tolerating diet, appetite poor. Blood sugars controled. BPs soft but stable.

## 2017-10-01 NOTE — PLAN OF CARE
Problem: Patient Care Overview  Goal: Plan of Care/Patient Progress Review  Outcome: No Change  Alert and oriented. Yellow thin drainage from femoral line site. Continent of soft stool X 1, no visible blood.

## 2017-10-01 NOTE — PROGRESS NOTES
[facility]    Sepsis Evaluation Progress Note    Date of Service: 10/01/2017    I was called to see Jon MERRITT Toribio due to abnormal vital signs triggering the Sepsis SIRS screening alert. He is not known to have an infection.     Physical Exam    Vital Signs:  Temp: 98.3  F (36.8  C) Temp src: Oral BP: (!) 84/57   Heart Rate: 109 Resp: 24 SpO2: 95 % O2 Device: Nasal cannula Oxygen Delivery: 2 LPM    Lab:  Lactic Acid   Date Value Ref Range Status   10/01/2017 2.8 (H) 0.7 - 2.0 mmol/L Final       The patient is at baseline mental status.    The rest of their physical exam is significant for distended abdomen that is non-tender, lungs cta bilat without crackles, 2-3+ LE edema, mild tachycardia, jaundice.    Assessment and Plan    The SIRS and exam findings are likely due to acute severe liver failure, there is no sign of sepsis at this time.  Will be performing infectious w/u, however no empiric abx right now.  No fluids either as he is significantly hypervolemia and the elevated lactate is almost certainly due to his liver failure.    Disposition: The patient will remain on the current unit. We will continue to monitor this patient closely.    Keith Jefferson MD

## 2017-10-01 NOTE — PROGRESS NOTES
[facility]  Hospitalist Progress Note  Keith Jefferson MD 10/1/17    Reason for Stay (Diagnosis): respiratory failure, etoh withdrawal         Assessment and Plan:      Summary of Stay: Jon Toribio is a 62 year old male with hx of etoh abuse with dependence and hx of withdrawal who has been drinking more lately and was found confused by a family member.  He then went unresponsive so the son started CPR and called EMS.  BG 39 for EMS.  He was in shock with lactic acidosis of 16 in the ED.  He was intubated for airway protection as he was obtunded and admitted to the ICU on 9/24/2017.  He had brinda melena and was put on octreotide gtt, protonix gtt, and ceftriaxone for presumed UGIB an possibility of varices.  GI consulted and he had EGD 9/5 showing severe esophagitis with a superficial esophageal tear. Initial labs show acute hepatitis presumably from etoh so he was started on methylprednisolone for a high DFscore.  Ammonia level was up that resolved with lactulose enemas.  His sedation was weaned and passed PST so extubated morning of 9/28.  Has evidence for malnutrition and multiple electrolyte deficiencies that are being replaced.  Advancing diet to DD2 per SLP.  So far no recurrent bleeding.  Transferred to medical floor for ongoing PT/OT and follow up of hepatitis.  Unfortunately significantly rising bilirubin indicative of progressive liver failure.  This is likely secondary to alcoholic hepatitis and is not responding to steroids that will likely be stopped by GI.  Infectious workup done to see if anything else could be contributing.  Will start ceftriaxone empirically to treat SBP as there is not much more we can offer.  Does not have a substantial amount of ascites.    Problem List/Assessment and Plan:   Acute on chronic alcoholic hepatitis:  Bilirubin 11-12, direct fraction is 9.6 so all likely liver related.  ALT wnl, and AST rending down from 315.  Alk phos only minimally elevated.  Liver shows fatty infiltration on CT but ascites is present.  I suspect some degree of cirrhosis.  INR elevated 2.0 range initially, some of which may be nutritional.  DF was > 100 so started methylprednisolone on 9/25/17.  -[facility] consulted, appreciate recs  -was on prednisolone 40mg daily, now day 7 and bilirubin up to 20 today indicating no improvement with steroids.  Per GI will stop steroids  -suspect this is evolution of his alcoholic hepatitis and represents a very poor prognosis moving forward.  He does have some ascites so cannot say for certain no SBP with his leukocytosis so will start empiric cefotaxime.  Did bedside US and there is some ascites, however not a significant amount that patient would benefit from a therapeutic tap.  As I plan to fully treat SBP empirically due to his progressive liver failure will not risk bedside diagnostic paracentesis with small fluid and elevated INR.  IR consultation for tomorrow could be considered, but again the way his liver function is headed treatment regardless makes the most sense  -start empiric ceftriaxone 1g q 24 hrs (do not have cefotaxime here)  -not a liver transplant candidate due to daily etoh use before admission and would not benefit from transfer at this time as supportive care is the current treatment for this condition  -Discussed with [facility] today who will also discuss with patient.  Patient's son also uses alcohol per patient and he did appear impaired when visiting when patient was in the ICU.  -repeat cmp and INR tomorrow  -palliative care consult placed for tomorrow    Elevated lactate:   Lactate 2.8, checked for meeting sirs protocol.  This is secondary to his liver failure due to alcoholic hepatitis.  His WBC is elevated in setting of high dose steroids most likely.  No fever and is not hypotensive.  His HR has remained around 100-110 throughout admission.  No fluids given his anasarca and this more likely secondary to his liver disease.  Due to  progress liver failure did initiated an infectious workup with chest xray negative for infiltrate.  Did obtain urine and blood cultures.  Will start empiric cefotaxime in case of SBP, however do not feel lactate is really secondary to sepsis and would not provide further fluid boluses for this reason.    Leukocytosis:  WBC up to 23.7 today.  WBC started rising with initiation of steroids for alcoholic hepatitis so this may be the sole etiology.  However with worsening liver failure did do infectious w/u.  -started ceftriaxone as above  -follow blood cultures  -check UA/UC    Acute encephalopathy, much improved: obtunded on arrival, possibly from alcohol withdrawal and hepatic encephalopathy, but cannot say for sure as he just received CPR.  Etoh level negative.  Suppose seizure also in differential given heavy etoh use.  Initially did not require sedation despite intubation/mechanical ventilation.  Head CT without acute findings.  Improving, but suspect still not at baseline and unclear how he would manage at home.    Shock due to UGI Bleed and underlying cirrhosis:  Weanedoff norepinephrine and vasopressin. Suspect a low BP at baseline from cirrhosis.    Acute blood loss anemia:  Rec'd 2 U PRBCs here.  Hg now stable without evidence for ongoing bleeding.    UGI Bleed:  Melena on admit.  Secondary to severe esophagitis and superficial esophageal tear.  -bid ppi now and on discharge    PENNY:  Looked all prerenal with creatinine 2.6 on admit.  Continue low rate MIVF-but will likely 3rd space in setting of hypoalbuminemia, cirrhosis.  Creatinine was down to 0.6, but now rising again to 1.1.    Alcohol abuse and dependence:  It is suspected he was drinking 1L alcohol per day.  Possible some withdrawal earlier in admission.  -d/c CIWA as no sign of withdrawal now and no recent ativan    Low K, mag, PO4, Ca: replacement protocols    Hyponatremia, resolved: 120 on admit now Na 15and stable.  Will monitor as his diet is  advanced.  Suspect some component of his liver disease.  Has 1-2+ LE edema and some ascites.     Coagulopathy:  INR 2.64 on admit.  Likely combination of liver disease and nutritional deficiency.  Down to 1.85 most recently.  -PT/INR tomorrow for Lille calculation    Dysphagia:  SLP on board    Severe malnutrition: suspect protein calore malnutrition.  -dietician consulted    DVT Prophylaxis: Pneumatic Compression Devices, no heparin for GI bleed  Code Status: Full Code.  This will need to be addressed moving forward.  Patient appears to be struggling with finding out the liver is much worse today and that if it continues in this trend he will likely die from this.  Will give him some time to consider this.  Palliative care consulted for tomorrow.  FEN: DD2 with thin liquids. SLP  Discharge Dispo: PT and OT on board.  TBD.  If liver failure worsening in upcoming days may need to consider hospice  Estimated Disch Date / # of Days until Disch:  TBD.  Progress liver failure, poor prognosis moving forward.         Interval History (Subjective):      Labs worsening this morning.  Patient feels the same, overall ok, tired.  Eating a little bit more.  Has a chronic cough, but nothing new. denies sob.  No abdominal pain.  No dysuria or diarrhea.  Did have a BM that was formed last night.      Discussed with patient his worsening liver tests and that overall if this continues his prognosis is unfortunately very poor.  He did not have much to say after this.  He said he understands and that it is what it is.  He understands it is most likely due to alcohol, but we will pursue infectious workup and start empiric abx in case there is SBP although this seems less likely.  I did discuss this with his son and patient's friend in the room.  Everyone understandably saddened by the news.                  Physical Exam:      Last Vital Signs:  BP (!) 84/57 (BP Location: Right arm)  Pulse 95  Temp 98.3  F (36.8  C) (Oral)  Resp 24   Wt 63 kg (139 lb)  SpO2 95%    Intake/Output Summary (Last 24 hours) at 10/01/17 1103  Last data filed at 10/01/17 1054   Gross per 24 hour   Intake              486 ml   Output               50 ml   Net              436 ml     Constitutional: Awake, NAD  Eyes: sclera yellow  HEENT:  MMM  Respiratory: lungs clear without crackles or wheeze  Cardiovascular: tachycardic, no murmur    GI: moderately distended, but soft. Non-tender.  BS present  Skin: bruising to forearm.  jaundiced  Musculoskeletal/extremities: 2+ anasarca  Neurologic: alert, oriented    Psychiatric: calm, flat affect         Medications:      All current medications were reviewed with changes reflected in problem list.         Data:      All new lab and imaging data was reviewed.   Labs:  Lab Results   Component Value Date    WBC 23.7 (H) 10/01/2017    WBC 24.4 (H) 09/30/2017    WBC 17.2 (H) 09/29/2017    HGB 9.2 (L) 10/01/2017    HGB 8.4 (L) 09/30/2017    HGB 8.0 (L) 09/29/2017    HCT 28.1 (L) 10/01/2017    HCT 25.1 (L) 09/30/2017    HCT 23.9 (L) 09/29/2017     10/01/2017     09/30/2017     (L) 09/29/2017     10/01/2017     09/30/2017     09/29/2017    POTASSIUM 4.6 10/01/2017    POTASSIUM 4.2 09/30/2017    POTASSIUM 4.1 09/29/2017    CHLORIDE 102 10/01/2017    CHLORIDE 103 09/30/2017    CHLORIDE 103 09/29/2017    CO2 22 10/01/2017    CO2 22 09/30/2017    CO2 25 09/29/2017    BUN 29 10/01/2017    BUN 20 09/30/2017    BUN 18 09/29/2017    CR 1.11 10/01/2017    CR 0.67 09/30/2017    CR 0.44 (L) 09/29/2017    GLC 72 10/01/2017     (H) 09/30/2017     (H) 09/29/2017     (H) 10/01/2017     (H) 09/30/2017     (H) 09/28/2017     (H) 10/01/2017     (H) 09/30/2017    ALT 65 09/28/2017    ALKPHOS 386 (H) 10/01/2017    ALKPHOS 382 (H) 09/30/2017    ALKPHOS 212 (H) 09/28/2017    BILITOTAL 20.9 (HH) 10/01/2017    BILITOTAL 19.5 (HH) 09/30/2017    BILITOTAL 12.6 (H) 09/28/2017     TEJA 24 09/28/2017    TEJA 36 09/27/2017    TEJA 28 09/26/2017    INR 2.01 (H) 10/01/2017    INR 1.85 (H) 09/26/2017    INR 2.00 (H) 09/25/2017     Lactate 2.8    Imaging:   Chest xray:  No infiltrate, some dilated loops small bowel      Keith Jefferson MD

## 2017-10-01 NOTE — PLAN OF CARE
Problem: Patient Care Overview  Goal: Plan of Care/Patient Progress Review     SLP- F/u on PO tolerance. Pt sleeping soundly, did not disturb. Per chart, pt tolerating Dysphagia Diet Level 2 (Mechanically Altered) with THIN liquids although poor appetite. Will f/u tomorrow 10/2 for diet tolerance, possible advancement.

## 2017-10-01 NOTE — PLAN OF CARE
Problem: Goal Outcome Summary  Goal: Goal Outcome Summary  PT: pt was sleeping upon arrival, pt declined PT stating that he did not sleep at all last night.  Will attempt in PM of schedule allows

## 2017-10-02 NOTE — PROGRESS NOTES
"CLINICAL NUTRITION SERVICES - REASSESSMENT NOTE      Malnutrition: Severe       EVALUATION OF PROGRESS TOWARD GOALS   Diet:  DD2 + thins  Supplements: Ensure with meals  With assist    Intake:  Barriers to adequate oral intakes previously including intubation with restricted diet post extubation (9/28/2017) and with severe esophagitis/superficial esophageal tear per EGD 9/5.  Diet advanced to clears 9/28 with further advanced to DD2 + thins 9/29.  Refusing meals, consuming bites to up to 75% of single food items.  Meeting <50% needs x 8 days since admit.  This AM patient had breakfast tray in front of him, slowly taking extremely small bites.  Reports having no appetite and also identifies early satiety as a barrier.  Do question if patient fully comprehends conversation as extremely slow to respond to questions and often repeats questions.  Not able to verbalize parts of conversation at times (requested a \"honeycrisp apple\" multiple times during conversation though was not able to verbalize we do not carry this specific type of food after multiple reiterations).      ASSESSED NUTRITION NEEDS (PER APPROVED PRACTICE GUIDELINES, Dosing weight: 60 Kg)  Estimated Energy Needs: 1934-5802+ kcals (30-35+ Kcal/Kg)  Justification: maintenance  Estimated Protein Needs:   grams protein (1.5-2 g pro/Kg)  Justification: maintenance  Estimated Fluid Needs: >1 mL/Kcal  Justification: maintenance      NEW FINDINGS:   - Palliative consulted, poor prognosis.  Not a liver transplant candidate.    - Meds reviewed.  - Labs reviewed.  - Wt trending up throughout admit, likely r/t fluid status:  Vitals:    09/27/17 0000 09/28/17 0000 09/29/17 0000 10/01/17 0603   Weight: 62.2 kg (137 lb 2 oz) 65.6 kg (144 lb 10 oz) 62.2 kg (137 lb 2 oz) 63 kg (139 lb)    10/02/17 0519   Weight: 64.5 kg (142 lb 3.2 oz)   - BM daily over the past at least 2 days.    Previous Goals:   Diet >/=FL within 24-48 hours  Evaluation: Met    Previous Nutrition " Diagnosis:   Malnutrition related to ETOH abuse and inadequate intake 2/2 upper GI bleed as evidenced by fat and muscle wasting, meeting <50% of estimated needs for > 5 days  Evaluation: No change, continued below      MALNUTRITION: (10/2/2017)  % Weight Loss: Unable to determine though none throughout admit, could be masked by fluid retention  % Intake:</= 50% for >/= 5 days (severe malnutrition) and suspect <75% for >1 month with ETOH abuse hx  Subcutaneous Fat Loss: Moderate to severe, as noted above  Muscle Loss: Moderate to severe, as noted above  Fluid Retention:Mild      Malnutrition Diagnosis: Severe malnutrition  In Context of:  Acute illness or injury and Environmental or social circumstances    CURRENT NUTRITION DIAGNOSIS  Malnutrition related to h/o etoh abuse with decreased appetiteand early satiety, subsequent reduced oral intakes as evidenced by fat and muscle wasting, meeting <50% of estimated needs for at least 8 days during admit.    INTERVENTIONS  Recommendations / Nutrition Prescription  Change to high calorie/high protein diet order.    Continue room service with assist to promote ordering of meals TID and to receive Ensure with meals TID.  Encouraged patient to consume Ensure/sip on Ensure between meals to enforce small, frequent meals with reports of poor appetite and early satiety.3    Add daily MVI/M.     Palliative/MD to outline goals of care relating to enteral nutrition support given based on trending, patient likely will not meet repletion needs orally, chronically underweight, meeting criteria for severe malnutrition.  Recommend nasal tube if goals remain restorative.       Implementation  Multivitamin/Mineral: As above.    Collaboration and Referral of Nutrition care: Discussed POC with team during rounds and with Abeba from Palliative.    Goals  Decisions regarding nutrition-related POC w/in 48 hrs.      MONITORING AND EVALUATION:  Progress towards goals will be monitored and evaluated  per protocol and Practice Guidelines.       Monica Perez RD, LD  Clinical Dietitian  3rd floor/ICU: [phone]  All other floors: [phone]  Weekend/holiday: [phone]

## 2017-10-02 NOTE — PROGRESS NOTES
[facility]    Sepsis Evaluation Progress Note    Date of Service: 10/02/2017    I was called to see Jon RENÉ Toribio due to abnormal vital signs triggering the Sepsis SIRS screening alert. He is not known to have an infection.     Physical Exam    Vital Signs:  Temp: 96.1  F (35.6  C) Temp src: Oral BP: 96/62   Heart Rate: 103 Resp: 20 SpO2: 90 % O2 Device: None (Room air) Oxygen Delivery: 2 LPM    Lab:  Lactic Acid   Date Value Ref Range Status   10/02/2017 2.2 (H) 0.7 - 2.0 mmol/L Final       The patient is at baseline mental status.    The rest of their physical exam is significant for distended abdomen/lethargy.    Assessment and Plan    The SIRS and exam findings are likely due to progressive esld, there is no sign of sepsis at this time.    Disposition: The patient will remain on the current unit. We will continue to monitor this patient closely.    Giovanna Quintero MD, MD

## 2017-10-02 NOTE — PROGRESS NOTES
"SPIRITUAL HEALTH SERVICES Progress Note  [facility] Med/Surg 5th floor    SH f/u visit with pt and his sister, Kelsea. Pt initially asleep at beginning of visit. Kelsea shared that \"we've got a long road ahead.\" Kelsea notes that she plans on living with pt in his home upon discharge, anticipating hospice care. Kelsea notes that she had a career at a nurse so \"I now some of what to expect.\" Kelsea notes that she is the closest of all the siblings to pt (Robin). Robin then became a bit restless and Kelsea lovingly shared with him that she was going to take care of him, that his liver was \"in bad shape and not going to get better\" and that she would stay and take care of him until she isn't needed. Robin was then very quiet and I asked him what he was thinking about and he said, \"Life.\" Then a bit later, \"I knew he would take me at some time.\" Kelsea then provided a moist washcloth to his lips and Robin fell back asleep. Kelsea appears accepting, grieving appropriately, and is also looking at things pragmatically. She expresses worry for Robin's son, Sherice. Will continue to follow up and provide support. I and the other chaplains remain available per pt/family/staff request.       KARTIK Strickland.  Staff    Pager #[phone]     "

## 2017-10-02 NOTE — PROGRESS NOTES
[facility]  Hospitalist Progress Note  Name: Jon Toribio    MRN: [id number]  YOB: 1955    Age: 62 year old  Date of admission: 9/24/2017  Primary care provider: No primary care provider on file.      Reason for Stay (Diagnosis): Acute encephalopathy/alcohol dependence and withdrawal/acute blood loss anemia         Assessment and Plan:      Summary of Stay:  Jon Toribio is a 62 year old male with hx of etoh abuse with dependence and hx of withdrawal who has been drinking more lately and was found confused by a family member.  He then went unresponsive so the son started CPR and called EMS.  BG 39 for EMS.  He was in shock with lactic acidosis of 16 in the ED.  He was intubated for airway protection as he was obtunded and admitted to the ICU on 9/24/2017.  He had brinda melena and was put on octreotide gtt, protonix gtt, and ceftriaxone for presumed UGIB an possibility of varices.  GI consulted and he had EGD 9/5 showing severe esophagitis with a superficial esophageal tear.  No NG/OG per GI.  Initial labs show acute hepatitis presumably from etoh so he was started on methylprednisolone for a high DFscore.  Ammonia level was up that resolved with lactulose enemas.  His sedation was weaned and passed PST so extubated morning of 9/28.  Has evidence for severe malnutrition and multiple electrolyte deficiencies that are being replaced.  Diet was advanced to DD2 per SLP but minimal to no p.o intake.  So far no recurrent bleeding.   patient was transferred to the medical floor on 10/1/17.  GI input on that day also appreciated.  Given minimal response to steroids, steroids were discontinued also on 10/1/17.  Overall, poor prognosis.  Patient is not able to participate effectively in palliative care decisions.  Palliative care consult appreciated and family leaning towards comfort measures.    Problem List/Assessment and Plan:   Acute encephalopathy: obtunded on arrival,  possibly from alcohol withdrawal and hepatic encephalopathy, but cannot say for sure as he just received CPR.  Etoh level negative.  Suppose seizure also in differential given heavy etoh use.  Initially did not require sedation despite intubation/mechanical ventilation.  Head CT without acute findings.  Patient was successfully extubated and mentally is improving, but suspect still not at baseline.     Shock due to UGI Bleed and underlying cirrhosis:   now weaned off norepinephrine and vasopressin. Suspect a low BP at baseline from cirrhosis.     Acute blood loss anemia:  Rec'd 2 U PRBCs here.  Hg now stable without evidence for ongoing bleeding.     UGI Bleed:  Melena on admit.  Secondary to severe esophagitis and superficial esophageal tear.  -bid ppi     Acute on chronic alcoholic hepatitis:  Bilirubin 11-12, direct fraction is 9.6 so all likely liver related.  ALT wnl, and AST rending down from 315.  Alk phos only minimally elevated. Liver shows fatty infiltration on CT but ascites is present.  I suspect some degree of cirrhosis.  INR elevated 2.0 range initially, some of which may be nutritional.  DF is > 100 so started methylprednisolone on 9/25/17.  -[facility] consulted, appreciate recs  -repeat Lille score 10/1 demonstrates that the patient is steroid refractory so this was discontinued.     PENNY:  Looks all prerenal. Creatinine normalized.    Alcohol abuse and dependence:  It is suspected he was drinking 1L alcohol per day.    -CIWA with prn ativan, has not needed much now and likely is over withdrawal  -May discontinue CIWA protocol    Ascites: On ceftriaxone for SBP prophylaxis.      Pulmonary secretions:  Small opacity LLL on chest xray 9/27.  At risk for aspiration, still without fevers, WBC rising which is probably steroid effect.  Procal 0.5.  No abx for now.     Low K, mag, PO4, Ca: replacement protocols     Hyponatremia, resolved: 120 on admit now Na 130s and stable.  Will monitor as his diet is advanced.  " Suspect some component of his liver disease.  Has 1-2+ LE edema and some ascites.  Currently off IV fluids  -daily bmp unless patient is transitioned to comfort measures    Coagulopathy:  INR 2.64 on admit.  Likely combination of liver disease and nutritional deficiency.      Severe protein calorie malnutrition:   -dietician input appreciated  -Limited p.o. intake; given poor prognosis, would not recommend tube feedings at this time    Leukocytosis: Likely secondary to steroids.  No signs of infection     DVT Prophylaxis: Pneumatic Compression Devices, no heparin for GI bleed  Code Status: Full Code for now but patient's family is leaning towards comfort measures.  I did discuss the case with the palliative care team.  POA is not well defined but will likely default the son.  Also has a sister as well as an ex-girlfriend whom in the past apparently was his designated POA but cannot be legally now unless there is some clear documentation.  We'll try to continue to work with family to come to a consensus on goal of cares.  FEN: Diet per speech  Discharge Dispo: tbd, PT and OT consulted.  Probable hospice at discharge  Estimated Disch Date / # of Days until Disch: tbd depending on family's decision on goal of cares    Time spent: Greater than 40 minutes      Interval History (Subjective):      Limited historian secondary to encephalopathy.  Is able to answer no to pain however.         Physical Exam:      Vital signs:  Temp: 96.1  F (35.6  C) Temp src: Oral BP: 96/62   Heart Rate: 103 Resp: 20 SpO2: 90 % O2 Device: None (Room air) Oxygen Delivery: 2 LPM   Weight: 64.5 kg (142 lb 3.2 oz)  Estimated body mass index is 20.65 kg/(m^2) as calculated from the following:    Height as of 9/18/06: 1.791 m (5' 10.5\").    Weight as of 10/3/06: 66.2 kg (146 lb).      I/O last 3 completed shifts:  In: 361 [P.O.:25; I.V.:336]  Out: -   Vitals:    09/27/17 0000 09/28/17 0000 09/29/17 0000 10/01/17 0603   Weight: 62.2 kg (137 lb 2 " oz) 65.6 kg (144 lb 10 oz) 62.2 kg (137 lb 2 oz) 63 kg (139 lb)    10/02/17 0519   Weight: 64.5 kg (142 lb 3.2 oz)       Constitutional: Awake, alert, appears frail and malnourished.  Otherwise, no apparent distress   Respiratory: Nl work of breathing. Clear to auscultation bilaterally, no crackles or wheezing   Cardiovascular: Regular rate and rhythm, normal S1 and S2, and no murmur noted   Abdomen: Normal bowel sounds, soft, moderately distended abdomen with fluid wave shift.  non-tender   Skin:  jaundiced.  No rashes, no cyanosis, dry to touch   Neuro: CN 2-12 intact, no localizing weakness   Extremities:  2+ edema, normal range of motion   HEENT Normocephalic, atraumatic, normal nasal turbinates; positive scleral icterus    Neck Supple; nl inspection; trachea midline; no thryomegaly   Psychiatric:  awake and alert.  Flat affect          Medications:      All current medications were reviewed with changes reflected in problem list.         Data:      All new lab and imaging data was reviewed.   Labs:    Recent Labs  Lab 10/01/17  1400 10/01/17  0903 10/01/17  0855   CULT Culture in progress No growth after 17 hours No growth after 17 hours       Recent Labs  Lab 10/02/17  0641 10/01/17  0720 09/30/17  1513   WBC 34.2* 23.7* 24.4*   HGB 8.8* 9.2* 8.4*   HCT 26.4* 28.1* 25.1*   MCV 98 100 99    154 150       Recent Labs  Lab 10/02/17  0641 10/01/17  0720 09/30/17  1402 09/29/17  0545   * 133 134 135   POTASSIUM 4.8 4.6 4.2 4.1   CHLORIDE 99 102 103 103   CO2 23 22 22 25   ANIONGAP 10 9 9 7   GLC 77 72 122* 112*   BUN 42* 29 20 18   CR 1.59* 1.11 0.67 0.44*   GFRESTIMATED 44* 67 >90 >90   GFRESTBLACK 54* 81 >90 >90   HILTON 7.9* 7.9* 7.6* 7.1*   MAG 2.0 1.8 1.8  --    PHOS  --  3.0 2.5 2.3*   PROTTOTAL 4.9* 4.7* 4.5*  --    ALBUMIN 1.6* 1.6* 1.5*  --    BILITOTAL 22.8* 20.9* 19.5*  --    ALKPHOS 421* 386* 382*  --    * 148* 169*  --    ALT 88* 100* 100*  --        Recent Labs  Lab 10/02/17  0641  10/01/17  0720 09/26/17  0530   INR 1.58* 2.01* 1.85*      Imaging:   No results found for this or any previous visit (from the past 24 hour(s)).    Giovanna Quintero [phone]

## 2017-10-02 NOTE — PLAN OF CARE
PT: Pt had palliative consult this morning, awaiting plan of care to be established. Will hold PT until decision has been made.

## 2017-10-02 NOTE — PLAN OF CARE
Problem: Patient Care Overview  Goal: Plan of Care/Patient Progress Review  Outcome: No Change  Alert and oriented. Very jaundiced. Cooperative with cares. No complaints of pain. Plan for Palliative consult.

## 2017-10-02 NOTE — PLAN OF CARE
Problem: Goal Outcome Summary  Goal: Goal Outcome Summary  CX:OT: Chart reviewed pt had palliative consult this morning, awaiting plan of care to be established with further family members involvement. Will hold OT until decision has been made

## 2017-10-02 NOTE — CONSULTS
"[facility]    Palliative Care Consultation   Text Page    Date of Admission:  9/24/2017  1630 Addendum:  Met with Son Sherice in presence of patient who is more alert but still does not participate and Sister Kelsea.  Reviewed below information about his understanding of prognosis which he appears to understand well, concept of comfort care, nutrition and code status in setting of comfort care with Sherice.  He appears distressed about making any decisions about patient but then states, \" This is not what I want, but I know what my dad would want.\"  He states he needs to review things with his brother.  We plan to revisit via phone tomorrow.  He tells me he has support also in his mother who is a teacher (patient's ex-wife) and we discussed involvement of  for assistance setting up care outside hospital when appropriate and as help to address and financial concerns.  [phone]  1200 Addendum: Met with patient's sister Kelsea with patient who was unable to stay awake to participate well.  Reviewed course of illness with Kelsea and notes from yesterday from Dr. Jefferson about prognosis.  Kelsea feels strongly that patient would not want \"heroic measures\" and would likely drink again if he could.  She feels he would be miserable in any type of rehab program or care center. We discussed option of comfort care focus and hospice care.  We also discussed option of artificial feeding but unclear benefit.  Kelsea feels it would be a risk with known esophageal bleed and patient would pull it out anyway.  She does not think patient would want to be coded.  She would like to discuss the above with patient's son Sherice when he arrives this afternoon.  She would be open to caring for patient at home with hospice care.  We also spent some time reflecting on their life together as siblings growing up in [facility]. She is greiving the loss of her brother but rightly attempting to consider planning for his care with what " she thinks he would want in mind.  Additional time [id number].     Assessment & Plan   Jon Toribio is a 62 year old male who was admitted on 9/24/2017. I was asked to see the patient for goals of care and support in decision making.    Recommendations:  1.Pain, Epigastric- r/t ascites? -IV diludid this AM with decrease LOC, reduce dose in setting of renal and hepatic failure. ,   2. Nutrition- Noted concern per dietician, discussed with Hospitalist.  Unclear value in nutrition in currrent medical condition with poor prognosis.  Will discuss further in context of goals of care as able with family.  Goal of Care:Full Code, Plan to readdress code status and goals of care in setting of new poor prognosis.  Sister Kelsea and son Sherice to be in at undetermined time later today.      Disease Process/es & Symptoms:  Jon Toribio is a 62 year old patient admitted with symptoms of unresponsiveness and hypoglycemia from home in setting of alcoholism and binge.  He initially was given CPR by son at instruction of , but was breathing and had a pulse when EMS arrived. He was initially treated for shock thought related to UGI bleed and cirrhosis, he is now weaned off pressors and has no further evidence of bleed which is thought related to severe esophagitis and superficial esophageal tear.  He was obtunded on admission and has cleared significantly but remains slow to respond and unable to answer more than simple yes/no questions currently.  His most significant concern is acute on chronic alcoholic hepatitis which has worsened despite course of steroids leaving him an overall poor prognosis.  Noted discussion with patient son Marlen and friend on 10/1.  This is in the setting of depression and alcoholism.    The following symptoms are noted by patient as concerning to his quality of life.  Pain - noted epigastric, reported B LE pain to nurse.    Psychosocial/Spiritual Needs:   is following. Consultation  "placed for  to follow.    Decision-Making & Goals of Care:  Discussion/counseling today about goals of care/decisions:   Spoke with son Sherice via phone.  Conversation was brief as he had to return to work.  He noted hearing bad news yesterday and knowledge that his father had to \"complete his last will and testament.\"  He states patient's ex-girlfriend should be allowed to have information and patient's sister will be arriving from [place] later today.  As patient is having difficulty communicating well and has unclear understanding of current situation, requested family consider when they would be available for further planning of care.  Will also discuss with sister if able when she arrives.    Patient has decision-making capacity Questionable  Patient has no known legal document designating a decision maker. Per policy next of kin is the designated decision maker. See System Informed Consent policy for guidance.  **Patient's son refers to a possible legal document from when patient was  but assuming it would no longer be valid after divorce.  I have asked him to bring it in for review.  1st- Adult Children- Sherice and Brian SanfordToribio  2nd - Parents   3rd- Adult siblings- Kelsea is older sibling and involved in hospitalization, Brother Tres is , another half-sister is reported uninvolved.    Patient has a completed health care directive available in the chart (Y/N): N  There is no POLST (Physician orders for life-sustaining treatment) form on file for this patient  Code Status: Full code     Findings & plan of care discussed with: MD, Nursing and JIAN  Follow-up plan from palliative team: Will attempt to meet with Sister Kelsea when she arrives.  Thank you for involving us in the patient's care.     Abeba SRINIVASAN, CNS  Pain Management and Palliative Care  [facility]  Pgr: [phone]    Time Spent on this Encounter   I spent  35 minutes in assessment of the " patient and discussion with the patient and family. Another 35 minutes in review of chart, documentation and discussion with the health care team.    Reason for Consult   Reason for consult: I was asked by Dr. Mcwilliams to evaluate this patient for Goals of care  Patient and family support.    Primary Care Physician   No primary care provider on file.    Chief Complaint   Epigastric pain    History is obtained from the patient, electronic health record and patient's son Sherice.    History of Present Illness   Jon Toribio is a 62 year old male who presents with  symptoms of unresponsiveness and hypoglycemia from home in setting of alcoholism and binge.  He initially was given CPR by son at instruction of , but was breathing and had a pulse when EMS arrived. He was initially treated for shock thought related to UGI bleed and cirrhosis, he is now weaned off pressors and has no further evidence of bleed which is thought related to severe esophagitis and superficial esophageal tear.  He was obtunded on admission and has cleared significantly but remains slow to respond and unable to answer more than simple yes/no questions currently.  His most significant concern is acute on chronic alcoholic hepatitis which has worsened despite course of steroids leaving him an overall poor prognosis.  Noted discussion with patient son Marlen and friend on 10/1.  This is in the setting of depression and alcoholism.    The following symptoms are noted by patient as concerning to his quality of life.  Pain - noted epigastric, reported B LE pain to nurse.      Past Medical History   I have reviewed this patient's medical history and updated it with pertinent information if needed.   Past Medical History:   Diagnosis Date     Other and unspecified hyperlipidemia      Substance abuse        Past Surgical History   I have reviewed this patient's surgical history and updated it with pertinent information if needed.  Past  Surgical History:   Procedure Laterality Date     C NONSPECIFIC PROCEDURE      Vasectomy     ESOPHAGOSCOPY, GASTROSCOPY, DUODENOSCOPY (EGD), COMBINED N/A 9/25/2017    Procedure: COMBINED ESOPHAGOSCOPY, GASTROSCOPY, DUODENOSCOPY (EGD);  Esophagoscopy, Gastroscopy, Duodenoscopy ;  Surgeon: Kota Ospina MD;  Location:  GI       Prior to Admission Medications   None     Allergies   No Known Allergies    Social History   I have updated and reviewed the following Social History Narrative:   Social History     Social History Narrative     Living situation: Home with son Sherice  Family system: , (ex?) girlfriend, Damari Rivas, Sons Sherice and son Brian who lives in [place].  Older sibling Kelsea involved and lives in [place].  Functional status (needs help with ADLs or IADLs): needs assist will all ADL's unclear baseline  Employment/education: unknown  Use of community resources: no  Activities/interests: unknown  History of substance use/abuse: yes  Muslim affiliation: unknown  Involvement in tiffany community: no    Family History   I have reviewed this patient's family history and updated it with pertinent information if needed.   Family History   Problem Relation Age of Onset     HEART DISEASE Father      Neurologic Disorder Mother      dementia     Dementia Mother      CANCER Brother      throat       Review of Systems   The 10 point Review of Systems is negative other than noted in the HPI or here.   Limited due to difficult for patient to participate/communicate, slow to no aswers.    Palliative Symptom Review (0=no symptom/no concern, 1=mild, 2=moderate, 3=severe):      Pain: 1-mild      Fatigue:       Nausea: 0-none      Constipation: 0-none      Diarrhea: 0-none      Depressive Symptoms:       Anxiety:       Drowsiness:       Poor Appetite: 3-severe      Shortness of Breath: 0-none      Insomnia: 2-moderate    Physical Exam   Temp:  [96.1  F (35.6  C)-97.5  F (36.4  C)] 96.1  F (35.6  C)  Heart  Rate:  [] 103  Resp:  [20-24] 20  BP: (91-98)/(54-62) 96/62  SpO2:  [90 %-94 %] 90 %  142 lbs 3.2 oz  GEN:  Appears to have difficulty focusing, awake, oriented x 2-3, unable to answer complex questions or even name months of the year for me.  Nurseing notes more alert prior to dilaudid this AM and oriented x3 at that time, appears comfortable, NAD.  HEENT:  Normocephalic/atraumatic, scleral icterus, no nasal discharge, mouth moist.  CV:  RRR, S1, S2; no murmurs or other irregularities noted.  +3 DP/PT pulses bilatererally;  3+ edema LE up to abdomen.  RESP:  Clear to auscultation bilaterally without rales/rhonchi/wheezing/retractions.  Symmetric chest rise on inhalation noted.  Normal respiratory effort.  ABD:  Rounded, tender in epigastric area, distended.  +BS  EXT:  Edema & pulses as noted above.  CMS intact x 4.     SKIN:  Jaundiced, Dry to touch, no exanthems noted in the visualized areas.    NEURO: Generalized weakness, no focal deficits.     Psych:  Flat affect.  Calm, cooperative, slow to respond.     Delirium Screen/CAM:  Delirium = (#1 and #2 = YES) + (#3 and/or #4)   1) Acute onset and fluctuating course:   YES   (acute change in mental status from baseline over last 24 hours)  2) Inattention:   YES   (difficulty focusing, distractible, can't follow conversation)  3) Disorganized thinking:   YES   (score only if #1 and #2 are YES)  (rambling/irrelevant conversation, unclear/illogical thoughts, inconsistency)  4) Altered level of consciousness:   YES   (score only if #1 and #2 are YES)  (other than alert, calm, cooperative)    Delirium/CAM score: 4/4  Interpretation:  1)  Delirium:  Present  2)  Type:  hypoactive  3)  Severity:  moderate    Data   Results for orders placed or performed during the hospital encounter of 09/24/17 (from the past 24 hour(s))   UA with Microscopic reflex to Culture   Result Value Ref Range    Color Urine Tonya     Appearance Urine Cloudy     Glucose Urine 50 (A)  NEG^Negative mg/dL    Bilirubin Urine Moderate (A) NEG^Negative    Ketones Urine Negative NEG^Negative mg/dL    Specific Gravity Urine 1.017 1.003 - 1.035    Blood Urine Moderate (A) NEG^Negative    pH Urine 5.0 5.0 - 7.0 pH    Protein Albumin Urine Negative NEG^Negative mg/dL    Urobilinogen mg/dL 4.0 (H) 0.0 - 2.0 mg/dL    Nitrite Urine Negative NEG^Negative    Leukocyte Esterase Urine Negative NEG^Negative    Source Midstream Urine     WBC Urine 17 (H) 0 - 2 /HPF    RBC Urine 2 0 - 2 /HPF    Bacteria Urine Few (A) NEG^Negative /HPF    Mucous Urine Present (A) NEG^Negative /LPF    Hyaline Casts 7 (H) 0 - 2 /LPF    Granular Casts 16 (A) NEG^Negative /LPF    Cellular Cast 20 (A) NEG^Negative /LPF    Amorphous Crystals Few (A) NEG^Negative /HPF   Urine Culture Aerobic Bacterial   Result Value Ref Range    Specimen Description Midstream Urine     Special Requests Specimen received in preservative     Culture Micro Culture in progress    Comprehensive metabolic panel   Result Value Ref Range    Sodium 132 (L) 133 - 144 mmol/L    Potassium 4.8 3.4 - 5.3 mmol/L    Chloride 99 94 - 109 mmol/L    Carbon Dioxide 23 20 - 32 mmol/L    Anion Gap 10 3 - 14 mmol/L    Glucose 77 70 - 99 mg/dL    Urea Nitrogen 42 (H) 7 - 30 mg/dL    Creatinine 1.59 (H) 0.66 - 1.25 mg/dL    GFR Estimate 44 (L) >60 mL/min/1.7m2    GFR Estimate If Black 54 (L) >60 mL/min/1.7m2    Calcium 7.9 (L) 8.5 - 10.1 mg/dL    Bilirubin Total 22.8 (HH) 0.2 - 1.3 mg/dL    Albumin 1.6 (L) 3.4 - 5.0 g/dL    Protein Total 4.9 (L) 6.8 - 8.8 g/dL    Alkaline Phosphatase 421 (H) 40 - 150 U/L    ALT 88 (H) 0 - 70 U/L     (H) 0 - 45 U/L   CBC with platelets   Result Value Ref Range    WBC 34.2 (H) 4.0 - 11.0 10e9/L    RBC Count 2.70 (L) 4.4 - 5.9 10e12/L    Hemoglobin 8.8 (L) 13.3 - 17.7 g/dL    Hematocrit 26.4 (L) 40.0 - 53.0 %    MCV 98 78 - 100 fl    MCH 32.6 26.5 - 33.0 pg    MCHC 33.3 31.5 - 36.5 g/dL    RDW 21.7 (H) 10.0 - 15.0 %    Platelet Count 194 150 -  450 10e9/L   INR   Result Value Ref Range    INR 1.58 (H) 0.86 - 1.14   Magnesium   Result Value Ref Range    Magnesium 2.0 1.6 - 2.3 mg/dL   Lactic acid level STAT   Result Value Ref Range    Lactic Acid 2.2 (H) 0.7 - 2.0 mmol/L

## 2017-10-02 NOTE — PLAN OF CARE
Problem: Patient Care Overview  Goal: Plan of Care/Patient Progress Review  Outcome: Declining  Pt lethargic and has no appetite only taking sips of fluids, significant ascites and edema throughout body, JAIME dc'd today, palliative consult today to determine goals of care, dilaudid x1 for abd pain but also c/o chronic hip pain, extremely jaundiced, no stools today and only voiding sm amounts (25cc) with some dribbling urine dark and orange, bladder scan showed >900 cc but unsure if this was d/t asictaco, spoke with Dr. Quintero who said to attempt x1 st cath, this was done but no urine obtained from bladder

## 2017-10-03 NOTE — PLAN OF CARE
Problem: Patient Care Overview  Goal: Plan of Care/Patient Progress Review  Outcome: No Change  Remains jaundiced. No complaints of pain. Right femoral area continues to drain yellow fluid

## 2017-10-03 NOTE — PROGRESS NOTES
"[facility]  Palliative Care Progress Note  Text Page       5690 Addendum:  Spoke with [facility] Nurse Shabnam [phone]  [facility] prefers to fill hospice meds through [facility] and will order from the discharge orders when they come to enroll the patient.  They do not need the hard script for controlled substances as they use the \"emergency fill\" policy for hospice.    DC orders for hospice meds are complete in EPIC.    Assessment & Plan   Jon Toribio is a 62 year old male who was admitted on 9/24/2017. I was asked to see the patient for goals of care and support in decision making.     Recommendations:  1.Pain,more generalized today- r/t ascites? -Dilaudid PRN.  2. Nutrition- Comfort eating.  3. Anxiety/restlessness- Pain control as above.  Haldol and ativan (reduced dose in hepatic/renal failure ordered PRN.  Goal of Care:DNR/DNI, comfort care, planning for DC with hospice support.      Disease Process/es & Symptoms:  Jon Toribio is a 62 year old patient admitted with symptoms of unresponsiveness and hypoglycemia from home in setting of alcoholism and binge.  He initially was given CPR by son at instruction of , but was breathing and had a pulse when EMS arrived. He was initially treated for shock thought related to UGI bleed and cirrhosis, he is now weaned off pressors and has no further evidence of bleed which is thought related to severe esophagitis and superficial esophageal tear.  He was obtunded on admission and has cleared significantly but remains slow to respond and unable to answer more than simple yes/no questions currently.  His most significant concern is acute on chronic alcoholic hepatitis which has worsened despite course of steroids leaving him an overall poor prognosis.  Noted discussion with patient son Marlen and friend on 10/1.  This is in the setting of depression and alcoholism.     The following symptoms are noted by " "patient as concerning to his quality of life.  Pain - noted epigastric, reported B LE pain to nurse.  Anxiety/ restlessness- I want to go home.     Psychosocial/Spiritual Needs:   is following, appreciate support from Theo Gonzalez MDiv.    Eugenie Ramirez involved in planning for comfort care transition home.  Appreciate support.     Decision-Making & Goals of Care:  Discussion/counseling today about goals of care/decisions:   Patient alert enough to communicate better today. He tells me his biggest concern is getting home and that his brother () will come and pick him up.  He tells me he remembers the conversations about his prognosis over the past few days but is not able to verbalize details.  I asked him if he was to get worse here does he want us to bring him back to the ICU and put him back on life support to try and save his life.  He states, \"no, I just want to go home.  I spoke with sherice via the phone who tells me he has spoken with his brother and they have agreed to just take patient home.  Kelsea will care for him while they work.  HE agrees that patient should be DNR/DNI and focus all treatment of comfort.  They will be in shortly for further conversation and planning.  10/2/2017Spoke with son Sherice via phone.  Conversation was brief as he had to return to work.  He noted hearing bad news yesterday and knowledge that his father had to \"complete his last will and testament.\"  He states patient's ex-girlfriend should be allowed to have information and patient's sister will be arriving from [place] later today.  As patient is having difficulty communicating well and has unclear understanding of current situation, requested family consider when they would be available for further planning of care.  Will also discuss with sister if able when she arrives.  0678 Addendum:  Met with Vince Smiley in presence of patient who is more alert but still does not participate and Sister Kelsea.  " "Reviewed below information about his understanding of prognosis which he appears to understand well, concept of comfort care, nutrition and code status in setting of comfort care with Sherice.  He appears distressed about making any decisions about patient but then states, \" This is not what I want, but I know what my dad would want.\"  He states he needs to review things with his brother.  We plan to revisit via phone tomorrow.  He tells me he has support also in his mother who is a teacher (patient's ex-wife) and we discussed involvement of  for assistance setting up care outside hospital when appropriate and as help to address and financial concerns.    1200 Addendum: Met with patient's sister Kelsea with patient who was unable to stay awake to participate well.  Reviewed course of illness with Kelsea and notes from yesterday from Dr. Jefferson about prognosis.  Kelsea feels strongly that patient would not want \"heroic measures\" and would likely drink again if he could.  She feels he would be miserable in any type of rehab program or care center. We discussed option of comfort care focus and hospice care.  We also discussed option of artificial feeding but unclear benefit.  Kelsea feels it would be a risk with known esophageal bleed and patient would pull it out anyway.  She does not think patient would want to be coded.  She would like to discuss the above with patient's son Sherice when he arrives this afternoon.  She would be open to caring for patient at home with hospice care.  We also spent some time reflecting on their life together as siblings growing up in [facility]. She is greiving the loss of her brother but rightly attempting to consider planning for his care with what she thinks he would want in mind.       Patient has decision-making capacity Questionable  Patient has no known legal document designating a decision maker. Per policy next of kin is the designated decision maker. See System Informed " "Consent policy for guidance.  **Patient's son refers to a possible legal document from when patient was  but assuming it would no longer be valid after divorce.  I have asked him to bring it in for review, he has not brought this in and I wonder if he meant the \"marriage\"  Was the legal document.  1st- Adult Children- Sherice and Brian Toribio  2nd - Parents   3rd- Adult siblings- Kelsea is older sibling and involved in hospitalization, Brother Tres is , another half-sister is reported uninvolved.     Patient has a completed health care directive available in the chart (Y/N): N  There is no POLST (Physician orders for life-sustaining treatment) form on file for this patient  Code Status:                    DNR/DNI- see discussion above.      Findings & plan of care discussed with: MD, Nursing and JIAN  Follow-up plan from palliative team: Will attempt to meet with Sister Kelsea when she arrives.  Thank you for involving us in the patient's care.     Abeba SRINIVASAN, CNS  Pain Management and Palliative Care  [facility]  Pgr: [phone]    Time Spent on this Encounter   I spent  20 minutes in assessment of the patient and discussion with the patient and family. Another 20 minutes in review of chart, documentation and discussion with the health care team.    Interval History   Patient is more alert today and restless.    Continues to c/o abdominal pain.  Discussion as above to focus on comfort care.    Review of Systems    C: NEGATIVE for fever, chills  E/M: NEGATIVE for ear, mouth and throat problems  R: NEGATIVE for significant cough or SOB    Palliative Symptom Review (0=no symptom/no concern, 1=mild, 2=moderate, 3=severe):      Pain: \"7\"      Fatigue: YES      Nausea: 0-none      Constipation: 0-none      Diarrhea: 0-none      Depressive Symptoms: 0-none      Anxiety: 2-moderate,I just want to get home      Drowsiness: 0-none      Poor Appetite: 2-moderate      Shortness of " Breath: 0-none      Insomnia: 1-mild    Physical Exam   Temp:  [96.4  F (35.8  C)-96.8  F (36  C)] 96.4  F (35.8  C)  Heart Rate:  [] 110  Resp:  [18-22] 22  BP: (93-94)/(51-57) 94/57  SpO2:  [89 %-91 %] 91 %  139 lbs 9.6 oz  GEN:  Alert, unclear orientation, thinks his  brother can come pick him up, appears restless.  HEENT:  Normocephalic/atraumatic,scleral icterus, no nasal discharge, mouth moist.  CV:  RRR, S1, S2; no murmurs or other irregularities noted.  +3 DP/PT pulses bilatererally;edema BLE up to chest  RESP:  Clear to auscultation bilaterally without rales/rhonchi/wheezing/retractions.  Symmetric chest rise on inhalation noted.  Normal respiratory effort.  ABD:  Rounded, soft, non-tender/non-distended.  +BS  EXT:  Edema & pulses as noted above.  CMS intact x 4.      SKIN:  Dry to touch, no exanthems noted in the visualized areas.  Jaundiced  NEURO: generalized weakness, no focal deficits noted.  PAIN BEHAVIOR: Cooperative  Psych:  restless, cooperative, slow to respond.  Confused conversation at times.    Medications     - MEDICATION INSTRUCTIONS -       - MEDICATION INSTRUCTIONS -       - MEDICATION INSTRUCTIONS -         LORazepam  0.25-0.5 mg Intravenous Q6H    Or     LORazepam  0.25-0.5 mg Oral Q6H    Or     LORazepam  0.25-0.5 mg Sublingual Q6H     senna-docusate  1 tablet Oral BID     cefTRIAXone  1 g Intravenous Q24H     pantoprazole  40 mg Oral BID AC     sodium chloride (PF)  3 mL Intravenous Q8H       Data   Results for orders placed or performed during the hospital encounter of 17 (from the past 24 hour(s))   Comprehensive metabolic panel   Result Value Ref Range    Sodium 131 (L) 133 - 144 mmol/L    Potassium 4.8 3.4 - 5.3 mmol/L    Chloride 99 94 - 109 mmol/L    Carbon Dioxide 22 20 - 32 mmol/L    Anion Gap 10 3 - 14 mmol/L    Glucose 67 (L) 70 - 99 mg/dL    Urea Nitrogen 52 (H) 7 - 30 mg/dL    Creatinine 2.03 (H) 0.66 - 1.25 mg/dL    GFR Estimate 33 (L) >60 mL/min/1.7m2     GFR Estimate If Black 41 (L) >60 mL/min/1.7m2    Calcium 7.3 (L) 8.5 - 10.1 mg/dL    Bilirubin Total 23.3 (HH) 0.2 - 1.3 mg/dL    Albumin 1.5 (L) 3.4 - 5.0 g/dL    Protein Total 4.7 (L) 6.8 - 8.8 g/dL    Alkaline Phosphatase 454 (H) 40 - 150 U/L    ALT 84 (H) 0 - 70 U/L     (H) 0 - 45 U/L   CBC with platelets   Result Value Ref Range    WBC 28.1 (H) 4.0 - 11.0 10e9/L    RBC Count 2.61 (L) 4.4 - 5.9 10e12/L    Hemoglobin 8.4 (L) 13.3 - 17.7 g/dL    Hematocrit 25.1 (L) 40.0 - 53.0 %    MCV 96 78 - 100 fl    MCH 32.2 26.5 - 33.0 pg    MCHC 33.5 31.5 - 36.5 g/dL    RDW 21.4 (H) 10.0 - 15.0 %    Platelet Count 168 150 - 450 10e9/L   Magnesium   Result Value Ref Range    Magnesium 1.9 1.6 - 2.3 mg/dL

## 2017-10-03 NOTE — PLAN OF CARE
Problem: Goal Outcome Summary  Goal: Goal Outcome Summary  Outcome: Declining  Pt abd more distended today, very jaundiced and still no appetite, pallitaive following and plan is for hospice meeting tomorrow morning with son and patients sister, c/o abd pain and nausea received dilaudid and zofran, changed to DRN/I today and placed on comfort care, urinary incontinence, edema from his feet up to his chest

## 2017-10-03 NOTE — CONSULTS
Care Transition Initial Assessment -   Reason For Consult: discharge planning  Met with: Family    Active Problems:    GI hemorrhage         DATA  Lives With: child(renata), adult  Living Arrangements: house  Description of Support System: Supportive, Involved  Who is your support system?: Children, Sibling(s)  Support Assessment: Adequate family and caregiver support, Adequate social supports. PT will have support from her son and his sister to provide the care pt will need once home.   Identified issues/concerns regarding health management: Pt is now comfort care due to his health issues related to his ETOH history.       Resources List: Hospice  [facility] [phone] fax [phone]     Quality Of Family Relationships: supportive  Transportation Available: family or friend will provide      ASSESSMENT  Cognitive Status:  alert  Concerns to be addressed: Pt will need support from family and Hospice. Pt has not seen a primary care MD in years. [facility] has established that pt is out of network to use [facility]. [facility] in network for pt. Spoke with [facility] to ensure they understood their medical director would need to be the primary care MD for pt  Spoke with pt's son Phill. HE is aware of the need for family to provide the 24 hour support. Pt really wants to be home. Family are wanting to honor pt's wishes.  Spoke with son about how Hospice will support he and pt, medication support and DME.  Son wanted to speak to Hospice to set up his own meeting.   Abeba Augustine will speak with Hospice about med set up for pt at home as well        PLAN  Family may need at some point to bring in additional care for pt/ per the son, he believes pt has money to do this although pt's ex wife is managing his funds  Family plan to provide transport to home tomorrow.   Will coordinate with Hao at [facility] and needs at [facility]

## 2017-10-03 NOTE — PLAN OF CARE
Problem: Goal Outcome Summary  Goal: Goal Outcome Summary  OT: Chart reviewed, met with pt's RN to confirm plan of comfort cares at this time with pt returning home with family assist and hospice cares. RN in agreement for no further IP OT needs     Occupational Therapy Discharge Summary     Reason for therapy discharge:    No further expectations of functional progress.  Change in medical status.  plan of comfort cares and hospice at this time     Progress towards therapy goal(s). See goals on Care Plan in Epic electronic health record for goal details.  Goals not met.  Barriers to achieving goals:   limited tolerance for therapy and comfort cares and home with hospice.     Therapy recommendation(s):    No further therapy is recommended.

## 2017-10-03 NOTE — PLAN OF CARE
PT: Chart review and discussion with pts RN to confirm plan for comfort cares at this time. Pt to return home with family assist and hospice cares. No further IP PT needs.     Physical Therapy Discharge Summary    Reason for therapy discharge:    No further expectations of functional progress.  Change in medical status.    Progress towards therapy goal(s). See goals on Care Plan in [facility] electronic health record for goal details.  Goals not met.  Barriers to achieving goals:   limited tolerance for therapy and comfort cares and home with hospice.    Therapy recommendation(s):    No further therapy is recommended.

## 2017-10-03 NOTE — PLAN OF CARE
Problem: Goal Outcome Summary  Goal: Goal Outcome Summary  Outcome: No Change  Pt up with lift, very weak and unsteady when attempted to get OOB with two assist. Pt both continent and incontinent of urine. Very significant edema to BLE, pt refusing elevation of legs or PCDs. Turned and repositioned pt Q2h, blanchable redness to coccyx, skin intact. Plan is to clarify goals of care tomorrow once pt's son has had some time to process and talk with other family members.

## 2017-10-03 NOTE — PLAN OF CARE
Problem: Goal Outcome Summary  Goal: Goal Outcome Summary  SLP: Per discussion with nursing and chart review, pt now on comfort cares and plan to d/c home with hospice. Nursing reports poor PO intake. Recommend MD liberalize diet as appropriate. Will complete ST orders.      Speech Language Therapy Discharge Summary     Reason for therapy discharge:    No further expectations of functional progress.     Progress towards therapy goal(s). See goals on Care Plan in [facility] electronic health record for goal details.  Goals met     Therapy recommendation(s):    No further therapy is recommended.

## 2017-10-03 NOTE — PROGRESS NOTES
SPIRITUAL HEALTH SERVICES Progress Note  FR Med/Surg 5th floor    SH visit per plan of care. No family present at this time and pt is sleeping. Will continue to follow. If urgent SH needs arise, please page on-call .     KARTIK Strickland.  Staff    Pager #[phone]

## 2017-10-03 NOTE — PROGRESS NOTES
[facility]  Hospitalist Progress Note  Name: Jon Toribio    MRN: [id number]  YOB: 1955    Age: 62 year old  Date of admission: 9/24/2017  Primary care provider: No primary care provider on file.      Reason for Stay (Diagnosis): Acute encephalopathy/alcohol dependence and withdrawal/acute blood loss anemia         Assessment and Plan:      Summary of Stay:  Jon Toribio is a 62 year old male with hx of etoh abuse with dependence and hx of withdrawal who has been drinking more lately and was found confused by a family member.  He then went unresponsive so the son started CPR and called EMS.  BG 39 for EMS.  He was in shock with lactic acidosis of 16 in the ED.  He was intubated for airway protection as he was obtunded and admitted to the ICU on 9/24/2017.  He had brinda melena and was put on octreotide gtt, protonix gtt, and ceftriaxone for presumed UGIB an possibility of varices.  GI consulted and he had EGD 9/5 showing severe esophagitis with a superficial esophageal tear.  No NG/OG per GI.  Initial labs show acute hepatitis presumably from etoh so he was started on methylprednisolone for a high DFscore.  Ammonia level was up that resolved with lactulose enemas.  His sedation was weaned and passed PST so extubated morning of 9/28.  Has evidence for severe malnutrition and multiple electrolyte deficiencies that are being replaced.  Diet was advanced to DD2 per SLP but minimal to no p.o intake.  So far no recurrent bleeding.   patient was transferred to the medical floor on 10/1/17.  GI input on that day also appreciated.  Given minimal response to steroids, steroids were discontinued also on 10/1/17.  Overall, poor prognosis.  Patient is not able to participate effectively in palliative care decisions.  Palliative care team was consulted. Given terminal diagnosis and poor prognosis family opted for comfort measures.    Problem List/Assessment and Plan:   Acute  encephalopathy: obtunded on arrival, possibly from alcohol withdrawal and hepatic encephalopathy, but cannot say for sure as he just received CPR.  Etoh level negative.  Suppose seizure also in differential given heavy etoh use.  Initially did not require sedation despite intubation/mechanical ventilation.  Head CT without acute findings.  Patient was successfully extubated and mentally is improving, but suspect still not at baseline. He is communicative but talks minimally when prompted     Shock due to UGI Bleed and underlying cirrhosis:   now weaned off norepinephrine and vasopressin. Suspect a low BP at baseline from cirrhosis.     Acute blood loss anemia:  Rec'd 2 U PRBCs here.  Hg now stable without evidence for ongoing bleeding.     UGI Bleed:  Melena on admit.  Secondary to severe esophagitis and superficial esophageal tear.  -bid ppi     Acute on chronic alcoholic hepatitis:  Bilirubin 11-12, direct fraction is 9.6 so all likely liver related.  ALT wnl, and AST rending down from 315.  Alk phos only minimally elevated. Liver shows fatty infiltration on CT but ascites is present.  I suspect some degree of cirrhosis.  INR elevated 2.0 range initially, some of which may be nutritional.  DF is > 100 so started methylprednisolone on 9/25/17.  -[facility] consulted, appreciate recs  -repeat Lille score 10/1 demonstrates that the patient is steroid refractory so this was discontinued.     PENNY:  worsening renal functions    Alcohol abuse and dependence:  It is suspected he was drinking 1L alcohol per day.    -CIWA with prn ativan, has not needed much now and likely is over withdrawal  -May discontinue CIWA protocol    Ascites: On ceftriaxone for SBP prophylaxis.      Pulmonary secretions:  Small opacity LLL on chest xray 9/27.  At risk for aspiration, still without fevers, WBC rising which is probably steroid effect.  Procal 0.5.  No abx for now.     Low K, mag, PO4, Ca: replacement protocols     Hyponatremia, resolved:  "120 on admit now Na 130s and stable.  Will monitor as his diet is advanced.  Suspect some component of his liver disease.  Has 1-2+ LE edema and some ascites.  Currently off IV fluids  -- will avoid labs as patient is on comfort cares    Coagulopathy:  INR 2.64 on admit.  Likely combination of liver disease and nutritional deficiency.      Severe protein calorie malnutrition:   -dietician input appreciated  -Limited p.o. intake; given poor prognosis, would not recommend tube feedings at this time    Leukocytosis: Likely secondary to steroids.  No signs of infection     DVT Prophylaxis: Pneumatic Compression Devices, no heparin for GI bleed  Code Status:DNR/ DNI comfort cares only  FEN: Diet per speech  Discharge Dispo: tbd, PT and OT consulted Hospice at discharge  Estimated Disch Date / # of Days until Disch: possibly tomorrow after hospice    Care plan d/w SW and palliative team      Interval History (Subjective):      Limited historian secondary to encephalopathy.  Is able to answer no to pain however.         Physical Exam:      Vital signs:  Temp: 96.4  F (35.8  C) Temp src: Oral BP: 94/57   Heart Rate: 110 Resp: 22 SpO2: 91 % O2 Device: None (Room air) Oxygen Delivery: 2 LPM   Weight: 63.3 kg (139 lb 9.6 oz)  Estimated body mass index is 20.65 kg/(m^2) as calculated from the following:    Height as of 9/18/06: 1.791 m (5' 10.5\").    Weight as of 10/3/06: 66.2 kg (146 lb).  I/O last 3 completed shifts:  In: 341.8 [P.O.:100; I.V.:241.8]  Out: 100 [Urine:100]  Vitals:    09/28/17 0000 09/29/17 0000 10/01/17 0603 10/02/17 0519   Weight: 65.6 kg (144 lb 10 oz) 62.2 kg (137 lb 2 oz) 63 kg (139 lb) 64.5 kg (142 lb 3.2 oz)    10/03/17 0500   Weight: 63.3 kg (139 lb 9.6 oz)       Constitutional: JAUNDICED< WEAK, PaleAwake, alert, appears frail and malnourished.  Otherwise, no apparent distress   Respiratory: Nl work of breathing. Clear to auscultation bilaterally, no crackles or wheezing   Cardiovascular: Regular rate " and rhythm, normal S1 and S2, and no murmur noted   Abdomen: Normal bowel sounds, soft,significantlydistended abdomen with fluid wave shift.  non-tender   Skin:  jaundiced.  No rashes, no cyanosis, dry to touch   Neuro: CN 2-12 intact, no localizing weakness   Extremities:  2+ edema, normal range of motion   HEENT Normocephalic, atraumatic, normal nasal turbinates; positive scleral icterus    Neck Supple; nl inspection; trachea midline; no thryomegaly   Psychiatric:  awake and alert.  Flat affect          Medications:      All current medications were reviewed with changes reflected in problem list.         Data:      All new lab and imaging data was reviewed.   Labs:    Recent Labs  Lab 10/01/17  1400 10/01/17  0903 10/01/17  0855   CULT 10,000 to 50,000 colonies/mLGram positive cocci*  Culture in progress No growth after 2 days No growth after 2 days       Recent Labs  Lab 10/03/17  0630 10/02/17  0641 10/01/17  0720   WBC 28.1* 34.2* 23.7*   HGB 8.4* 8.8* 9.2*   HCT 25.1* 26.4* 28.1*   MCV 96 98 100    194 154       Recent Labs  Lab 10/03/17  0630 10/02/17  0641 10/01/17  0720 09/30/17  1402 09/29/17  0545   * 132* 133 134 135   POTASSIUM 4.8 4.8 4.6 4.2 4.1   CHLORIDE 99 99 102 103 103   CO2 22 23 22 22 25   ANIONGAP 10 10 9 9 7   GLC 67* 77 72 122* 112*   BUN 52* 42* 29 20 18   CR 2.03* 1.59* 1.11 0.67 0.44*   GFRESTIMATED 33* 44* 67 >90 >90   GFRESTBLACK 41* 54* 81 >90 >90   HILTON 7.3* 7.9* 7.9* 7.6* 7.1*   MAG 1.9 2.0 1.8 1.8  --    PHOS  --   --  3.0 2.5 2.3*   PROTTOTAL 4.7* 4.9* 4.7* 4.5*  --    ALBUMIN 1.5* 1.6* 1.6* 1.5*  --    BILITOTAL 23.3* 22.8* 20.9* 19.5*  --    ALKPHOS 454* 421* 386* 382*  --    * 112* 148* 169*  --    ALT 84* 88* 100* 100*  --        Recent Labs  Lab 10/02/17  0641 10/01/17  0720   INR 1.58* 2.01*      Imaging:   No results found for this or any previous visit (from the past 24 hour(s)).

## 2017-10-04 NOTE — DISCHARGE SUMMARY
[facility]  Discharge Summary  Hospitalist      Date of Admission:  9/24/2017  Date of Discharge:  10/4/2017  Provider:  Kym Chicas MD  Date of Service (when I last saw the patient): 10/04/17      Primary Provider: No primary care provider on file.          Discharge Diagnosis:   Discharge Diagnoses   Acute encephalopathy  Acute on chronic Liver disease  Shock due to Upper GI bleeding   Acute blood loss Anemia  Acute Kidney injury  Alcohol abuse and dependence  Severe Protein Calorie Malnutrition  Coagulopathy  Hyponatremia      Other medical issues:  Past Medical History:   Diagnosis Date     Other and unspecified hyperlipidemia      Substance abuse           History of Present Illness   Jon Toribio is an 62 year old male who presented after he was found unresponsive and CPR was initiated.  Please see the admission history and physical for full details.    Hospital Course     Jon Toribio was admitted on 9/24/2017.  He is a 62 year old male with hx of etoh abuse with dependence and hx of withdrawal who was drinking more lately and was found confused by a family member.  He then went unresponsive so the son started CPR and called EMS.  EMS found him to be hypoglycemic BG 39  And he was in shock with lactic acidosis of 16 in the ED.  He was intubated for airway protection as he was obtunded and admitted to the ICU on 9/24/2017.  He had brinda melena and was put on octreotide gtt, protonix gtt, and ceftriaxone for presumed UGIB an possibility of varices.  GI consulted and he had EGD 9/5 showing severe esophagitis with a superficial esophageal tear.  Initial labs showed acute hepatitis presumably from etoh so he was started on methylprednisolone for a high DFscore.  Ammonia level was up that resolved with lactulose enemas.  His sedation was weaned and was extubated morning of 9/28.  Has had evidence for severe malnutrition and multiple electrolyte deficiencies that were replaced.  Diet  was advanced to DD2 per SLP but minimal to no p.o intake. Patient was transferred to the medical floor on 10/1/17. Given minimal response to steroids and worsening Liver functions, steroids were discontinued  on 10/1/17.  Overall, poor prognosis.  Patient was not able to participate effectively in palliative care decisions.  Palliative care team was consulted. Given terminal diagnosis and poor prognosis family opted for comfort measures. Patient is discharged with plans for comfort cares and home hospice.    The following problems were addressed during his hospitalization:    Acute encephalopathy: obtunded on arrival, possibly from alcohol withdrawal and hepatic encephalopathy, but cannot say for sure as he just received CPR.  Etoh level negative.  Suppose seizure also in differential given heavy etoh use.  Initially did not require sedation despite intubation/mechanical ventilation.  Head CT without acute findings.  Patient was successfully extubated and mentally improved some  but not at baseline. He is communicative but talks minimally when prompted      Shock due to UGI Bleed and underlying cirrhosis:  ON pressors initially. improved      Acute blood loss anemia:  Rec'd 2 U PRBCs here.       UGI Bleed:  Melena on admit.  Secondary to severe esophagitis and superficial esophageal tear.  -treated with bid ppi      Acute on chronic alcoholic hepatitis:  Failed treatment with steroids      PENNY:  worsening renal functions    Alcohol abuse and dependence:   -- It is suspected he was drinking 1L alcohol per day.    -treated with CIWA with prn ativan     Ascites: initially was On ceftriaxone for SBP prophylaxis.       Pulmonary secretions:  Small opacity LLL on chest xray 9/27.  At risk for aspiration, still without fevers, WBC rising which is probably steroid effect.  Procal 0.5.        Low K, mag, PO4, Ca: replaced per  protocols      Hyponatremia, resolved:     Coagulopathy:  INR 2.64 on admit.  Likely combination of  liver disease and nutritional deficiency.       Severe protein calorie malnutrition:   -Limited p.o. intake; given poor prognosis.     Leukocytosis: Likely secondary to steroids.  No signs of infection          Significant Results and Procedures   As noted    Pending Results   Unresulted Labs Ordered in the Past 30 Days of this Admission     Date and Time Order Name Status Description    10/1/2017 0819 Blood culture Preliminary     10/1/2017 0819 Blood culture Preliminary     9/24/2017 1832 Plasma prepare order unit In process           Code Status   DNR / DNI       Primary Care Physician   No primary care provider on file.    Physical Exam                      Vitals:    10/01/17 0603 10/02/17 0519 10/03/17 0500   Weight: 63 kg (139 lb) 64.5 kg (142 lb 3.2 oz) 63.3 kg (139 lb 9.6 oz)     Vital Signs with Ranges     I/O last 3 completed shifts:  In: 0   Out: 50 [Urine:50]    Constitutional: Awake, jaundiced, cooperative, tired  Respiratory: decreased breath sounds bases, poor inspiratory effort  Cardiovascular: tachycardic S1 and S2,   GI: + ascites, distended  EXT: 2+ edema    Discharge Disposition   Discharged to home    Consultations This Hospital Stay   COLORECTAL SURGERY IP CONSULT  INTENSIVIST IP CONSULT  GASTROENTEROLOGY IP CONSULT  SPEECH LANGUAGE PATH ADULT IP CONSULT  SWALLOW EVAL SPEECH PATH AT BEDSIDE IP CONSULT  NUTRITION SERVICES ADULT IP CONSULT  PHYSICAL THERAPY ADULT IP CONSULT  OCCUPATIONAL THERAPY ADULT IP CONSULT  PALLIATIVE CARE ADULT IP CONSULT  SOCIAL WORK IP CONSULT  SOCIAL WORK IP CONSULT    Time Spent on this Encounter   IKym, personally saw the patient today and spent greater than 30 minutes discharging this patient.    Discharge Orders     Reason for your hospital stay   Please refer to discharge summary. Briefly patient admitted with acute on chronic liver failure, encephalopathy, Upper GI Bleed. Given his terminal illness and poor prognosis patient and his family  decided to go home on hospice     Follow-up and recommended labs and tests    Home Hospice to follow up for comfort measures     Activity   Your activity upon discharge: activity as tolerated     DNR/DNI     Diet   Follow this diet upon discharge: Orders Placed This Encounter     Room Service     Snacks/Supplements Adult: Ensure Plus (Adult); With Meals     High Kcal/High Protein Diet, ADULT       Discharge Medications   Current Discharge Medication List      START taking these medications    Details   acetaminophen (TYLENOL) 650 MG Suppository Place 1 suppository (650 mg) rectally every 6 hours as needed for mild pain or fever (temperature over 100  F )  Qty: 10 suppository, Refills: 0    Associated Diagnoses: Hospice care patient      LORazepam (ATIVAN) 0.5 MG tablet Take 0.5-1 tablets (0.25-0.5 mg) by mouth, place under tongue or insert rectally every 3 hours as needed for anxiety  Qty: 20 tablet, Refills: 0    Associated Diagnoses: Hospice care patient      bisacodyl (DULCOLAX) 10 MG Suppository Place 1 suppository (10 mg) rectally daily as needed for other (for no bowel movement for 72 hours)  Qty: 10 suppository, Refills: 0    Associated Diagnoses: Hospice care patient      senna-docusate (SENOKOT-S;PERICOLACE) 8.6-50 MG per tablet Take 1 tablet by mouth 2 times daily  Qty: 100 tablet, Refills: 0    Associated Diagnoses: Hospice care patient      atropine 1 % ophthalmic solution Take 2-4 drops by mouth, place under tongue or place inside cheek every 2 hours as needed for other (terminal respiratory secretions) Not for ophthalmic use.  Qty: 5 mL, Refills: 1    Associated Diagnoses: Hospice care patient      haloperidol (HALDOL) 1 MG tablet Take 1-2 tablets (1-2 mg) by mouth, place under tongue or insert rectally every 6 hours as needed for agitation (nausea)  Qty: 30 tablet, Refills: 1    Associated Diagnoses: Hospice care patient      HYDROmorphone, HIGH CONC, (DILAUDID) 10 mg/mL LIQD oral Take 0.1-0.2 mLs  (1-2 mg) by mouth or place under tongue every 2 hours as needed for moderate to severe pain (and/or  shortness of breath)  Qty: 30 mL, Refills: 0    Associated Diagnoses: Hospice care patient           Allergies   No Known Allergies  Data   Most Recent 3 CBC's:  Recent Labs   Lab Test  10/03/17   0630  10/02/17   0641  10/01/17   0720   WBC  28.1*  34.2*  23.7*   HGB  8.4*  8.8*  9.2*   MCV  96  98  100   PLT  168  194  154      Most Recent 3 BMP's:  Recent Labs   Lab Test  10/03/17   0630  10/02/17   0641  10/01/17   0720   NA  131*  132*  133   POTASSIUM  4.8  4.8  4.6   CHLORIDE  99  99  102   CO2  22  23  22   BUN  52*  42*  29   CR  2.03*  1.59*  1.11   ANIONGAP  10  10  9   HILTON  7.3*  7.9*  7.9*   GLC  67*  77  72     Most Recent 2 LFT's:  Recent Labs   Lab Test  10/03/17   0630  10/02/17   0641   AST  108*  112*   ALT  84*  88*   ALKPHOS  454*  421*   BILITOTAL  23.3*  22.8*     Most Recent INR's and Anticoagulation Dosing History:  Anticoagulation Dose History     Recent Dosing and Labs Latest Ref Rng & Units 9/24/2017 9/24/2017 9/25/2017 9/26/2017 10/1/2017 10/2/2017    INR 0.86 - 1.14 2.64(H) 2.10(H) 2.00(H) 1.85(H) 2.01(H) 1.58(H)        Most Recent 3 Troponin's:No lab results found.  Most Recent Cholesterol Panel:  Recent Labs   Lab Test  09/28/17   0445   TRIG  Canceled, Test credited     Most Recent 6 Bacteria Isolates From Any Culture (See EPIC Reports for Culture Details):  Recent Labs   Lab Test  10/01/17   1400  10/01/17   0903  10/01/17   0855  09/24/17   2334  09/24/17   2025  09/24/17   1720   CULT  10,000 to 50,000 colonies/mL  Enterococcus faecalis  *  <10,000 colonies/mL  urogenital sonya  Susceptibility testing not routinely done    No growth after 3 days  No growth after 3 days  Canceled, Test credited  Duplicate request  Charge credited    No growth  No growth     Most Recent TSH, T4 and A1c Labs:No lab results found.  Results for orders placed or performed during the hospital encounter  of 09/24/17   CT Head w/o Contrast    Narrative    CT SCAN OF THE HEAD WITHOUT CONTRAST   9/24/2017 6:44 PM     HISTORY: Unresponsive.    TECHNIQUE:  Axial images of the head and coronal reformations without  IV contrast material.  Radiation dose for this scan was reduced using  automated exposure control, adjustment of the mA and/or kV according  to patient size, or iterative reconstruction technique.    COMPARISON: None.    FINDINGS: There is some mild cerebral atrophy. Minimal patchy white  matter changes are present in both hemispheres without mass effect.  There is no evidence for intracranial hemorrhage, mass effect, acute  infarct, or skull fracture. Visualized paranasal sinuses and mastoid  air cells are clear. Vascular calcifications noted.      Impression    IMPRESSION: Chronic changes. No evidence for intracranial hemorrhage  or any acute process.    CHERRY MADDOX MD   XR Chest Port 1 View    Narrative    CHEST PORTABLE ONE VIEW   9/24/2017 6:01 PM     HISTORY: Unresponsive.    COMPARISON: None.      Impression    IMPRESSION: ET tube and nasogastric tube in good position. Heart size  and pulmonary vascular normal. Lungs are clear.    CHERRY MADDOX MD   Cervical spine CT w/o contrast    Narrative    CT CERVICAL SPINE WITHOUT CONTRAST   9/24/2017 6:44 PM     HISTORY: Unresponsive.     TECHNIQUE: Axial images of the cervical spine were obtained without  intravenous contrast. Multiplanar reformations were performed.  Radiation dose for this scan was reduced using automated exposure  control, adjustment of the mA and/or kV according to patient size, or  iterative reconstruction technique.     COMPARISON: None.    FINDINGS: Sagittal reconstructions demonstrate minimal degenerative  spondylolisthesis of C4 on C5 measuring approximately 2 to 3 mm. Disc  space narrowing is present at C5-C6. There is no evidence for any  acute fracture.    C1-C2: Degenerative changes are present. There is no stenosis.    C2-C3:  Left-sided uncinate spurring and minimal facet hypertrophy is  present. There is no stenosis.    C3-C4: Moderate left-sided facet hypertrophy and posterior osteophyte  formation is present. There is also some left-sided uncinate spurring.  There is moderate left-sided foraminal stenosis and mild central canal  stenosis.    C4-C5: Uncinate spurring and facet hypertrophy is present on the left  greater than right causing some moderate left-sided foraminal  stenosis. There is also mild central canal stenosis due to broad-based  disc bulging and some minimal spondylolisthesis.    C5-6: Broad-based posterior osteophyte formation and facet hypertrophy  is present causing moderate central canal stenosis and moderate to  severe bilateral neural foraminal stenosis.    C6-7: Posterior osteophyte formation, disc bulge and facet hypertrophy  is present causing mild to moderate bilateral neural foraminal  stenosis and mild central canal stenosis.    C7-T1: Normal.      Impression    IMPRESSION:  1. Grade 1 degenerative spondylolisthesis at C4-C5.  2. Multilevel degenerative disc and facet disease most advanced at  C5-C6 where there is moderate central canal stenosis and moderate to  severe bilateral neural foraminal stenosis.  3. No evidence for fracture.    CHERRY MADDOX MD   CT Chest Abdomen Pelvis w/o Contrast    Narrative    CT CHEST, ABDOMEN, PELVIS WITHOUT CONTRAST 9/24/2017 6:45 PM     HISTORY: Unresponsive.    Radiation dose for this scan was reduced using automated exposure  control, adjustment of the mA and/or kV according to patient size, or  iterative reconstruction technique.    FINDINGS:   Chest: Endotracheal tube tip is approximately 4 cm from the ailyn  midway between the clavicles and ailyn. Nasogastric tube extends into  the stomach. The lungs are grossly clear with only mild posterior  dependent atelectasis at the lung bases. No pleural effusion or  pneumothorax is demonstrated. The mediastinum and josselyn are  grossly  unremarkable for the unenhanced technique.    Abdomen/pelvis: There is marked hepatic fatty infiltration. Contents  of the gallbladder are relatively radiodense measuring approximately  30 HU. Mild to moderate peritoneal fluid is noted within the pelvis,  paracolic gutters, and adjacent to the spleen. No renal stones are  demonstrated. There is no hydronephrosis. The bowel is not well  evaluated without contrast enhancement. Diffuse small bowel wall  thickening and colonic wall thickening may be present. Sigmoid  diverticulosis is also noted. A Street catheter is present within the  urinary bladder which is relatively collapsed.      Impression    IMPRESSION:  1. Nonspecific peritoneal fluid within the abdomen and pelvis.  2. Evaluation of the bowel is suboptimal due to lack of contrast  enhancement. Diffuse bowel wall thickening may be present. There is no  definitive evidence of bowel obstruction. No free peritoneal air.  3. Severe hepatic fatty infiltration.  4. Bilateral posterior dependent pulmonary mild atelectasis.    RAMIN GULILORY MD   XR Chest Port 1 View    Narrative    XR CHEST PORT 1 VW  9/27/2017 6:10 AM      HISTORY: Intubated - lots of secretions.     COMPARISON: 9/24/2017.    FINDINGS: Supine portable chest. ET tube in place with tip  approximately 3 cm above the ailyn. No pneumothorax. There is a left  pleural effusion and increasing left basilar infiltrate. The lungs are  otherwise clear.      Impression    IMPRESSION: Left pleural effusion and left basilar infiltrate.    OZIEL SNYDER MD   XR Chest Port 1 View    Narrative    CHEST ONE VIEW SEMI-UPRIGHT 9/28/2017 3:18 PM     HISTORY: Follow up infiltrate    COMPARISON: 9/27/2017      Impression    IMPRESSION: Left basilar infiltrate and effusion are unchanged. Slight  increase in minimal right lower lobe atelectasis and/or infiltrate.    JHONATAN RUCKER MD   XR Chest 2 Views    Narrative    CHEST TWO VIEWS  10/1/2017 9:55 AM      COMPARISON: Frontal chest x-ray 9/28/2017    HISTORY: Persistent hypoxia and leukocytosis, evaluate for pneumonia.      Impression    IMPRESSION: Tiny bilateral pleural effusions again noted. There are no  airspace opacities to suggest pneumonia. There is no pneumothorax.  Heart size is normal with no evidence for congestive failure.    TERE SANCHEZ MD             Disclaimer: This note consists of symbols derived from keyboarding, dictation and/or voice recognition software. As a result, there may be errors in the script that have gone undetected. Please consider this when interpreting information found in this chart.

## 2017-10-04 NOTE — PLAN OF CARE
Problem: Patient Care Overview  Goal: Plan of Care/Patient Progress Review  Outcome: No Change  Pt remains admitted for comfort cares. Pt reported no pain or nausea during shift. Coccyx reddened, repositioned x1, refused for more interventions. Mepilex in place on arms for open wounds, clean dry and intact. Poor appetite. Forgetful. Skin/sclera jaundiced. Abdomen distended, edema +3 throughout legs and feet. Leaking ascites fluid from puncture sites. Possibly home with hospice care. Will continue to monitor.

## 2017-10-04 NOTE — PLAN OF CARE
Problem: Patient Care Overview  Goal: Plan of Care/Patient Progress Review  Outcome: Adequate for Discharge Date Met:  10/04/17  Pt to D/C to home with hospice care.  Pt provided with d/c instructions, including new medications, when medications were last given, and when to take them again. Hospice to follow up with pt in home tonight or tomorrow.  Pt unable to verbalize understanding of all d/c and f/u instructions.  All questions were answered at this time.  Copy of paperwork sent with pt.  Medications to be taken care of by hospice.  [facility] to provide transport.  All personal belongings sent with pt. No concerns at this time.

## 2017-10-04 NOTE — PLAN OF CARE
Problem: Goal Outcome Summary  Goal: Goal Outcome Summary  Outcome: No Change  Patient remains hospitalized for liver failure. Remains on comfort care. Pt and family met with hospice this AM - plan to discharge at 1700 tonight. Family aware of the plan. Utilized sublingual Dilaudid for pain x1 this shift.

## 2017-10-04 NOTE — PROGRESS NOTES
"SPIRITUAL HEALTH SERVICES Progress Note  [facility] Med/Surg 5th floor     visit for follow up. Met with Robin's three nieces who are visiting him. They briefly shared that Robin is going home with hospice this afternoon. They reflected on the challenges Robin has faced and shared that \"he is a good man.\" They shared that they have told Robin that his brother, who is , is \"coming to take him home.\" They also note that Robin's parents are  and will be able to see them as well. Robin slept through visit and appears comfortable. No follow planned as pt to discharge later today. SH remains available per pt/family need or request.     KARTIK Strickland.  Staff    Pager #[phone]     "

## 2017-10-04 NOTE — PLAN OF CARE
Problem: Patient Care Overview  Goal: Plan of Care/Patient Progress Review  Outcome: Declining  Awake much of shift. Denies pain, but appears uncomfortable with cares. Incontinent of urine X 1. Some leaking of fluid from old femoral line, gauze applied. Drank small amount of orange juice. Comfort cares

## 2017-10-04 NOTE — PLAN OF CARE
Problem: Patient Care Overview  Goal: Plan of Care/Patient Progress Review  Outcome: No Change  Pt up with lift but refusing to get up in chair tonight. Refusing repositioning for the first part of the shift, also refusing all food or fluids. Multiple visitors were in to see pt tonight. Pt is denying any pain at this time. Skin very jaundice.

## 2017-10-07 LAB
BACTERIA SPEC CULT: NO GROWTH
BACTERIA SPEC CULT: NO GROWTH
Lab: NORMAL
Lab: NORMAL
SPECIMEN SOURCE: NORMAL
SPECIMEN SOURCE: NORMAL

## (undated) DEVICE — ENDO SNARE HEXAGONAL ISNARE 2.5X4.0CM W/25GA NDL

## (undated) DEVICE — KIT ENDO TURNOVER/PROCEDURE W/CLEAN A SCOPE LINERS 103888